# Patient Record
Sex: MALE | Race: WHITE | NOT HISPANIC OR LATINO | URBAN - METROPOLITAN AREA
[De-identification: names, ages, dates, MRNs, and addresses within clinical notes are randomized per-mention and may not be internally consistent; named-entity substitution may affect disease eponyms.]

---

## 2017-05-02 ENCOUNTER — OUTPATIENT (OUTPATIENT)
Dept: OUTPATIENT SERVICES | Facility: HOSPITAL | Age: 72
LOS: 1 days | Discharge: HOME | End: 2017-05-02

## 2017-06-28 DIAGNOSIS — I25.10 ATHEROSCLEROTIC HEART DISEASE OF NATIVE CORONARY ARTERY WITHOUT ANGINA PECTORIS: ICD-10-CM

## 2017-06-28 DIAGNOSIS — E78.00 PURE HYPERCHOLESTEROLEMIA, UNSPECIFIED: ICD-10-CM

## 2017-06-28 DIAGNOSIS — Z79.899 OTHER LONG TERM (CURRENT) DRUG THERAPY: ICD-10-CM

## 2017-08-01 ENCOUNTER — APPOINTMENT (OUTPATIENT)
Dept: SURGERY | Facility: CLINIC | Age: 72
End: 2017-08-01
Payer: MEDICARE

## 2017-08-01 VITALS
WEIGHT: 170 LBS | DIASTOLIC BLOOD PRESSURE: 85 MMHG | OXYGEN SATURATION: 96 % | BODY MASS INDEX: 25.76 KG/M2 | HEART RATE: 97 BPM | SYSTOLIC BLOOD PRESSURE: 130 MMHG | HEIGHT: 68 IN

## 2017-08-01 DIAGNOSIS — Z78.9 OTHER SPECIFIED HEALTH STATUS: ICD-10-CM

## 2017-08-01 DIAGNOSIS — Z87.898 PERSONAL HISTORY OF OTHER SPECIFIED CONDITIONS: ICD-10-CM

## 2017-08-01 DIAGNOSIS — Z98.890 OTHER SPECIFIED POSTPROCEDURAL STATES: ICD-10-CM

## 2017-08-01 DIAGNOSIS — Z87.19 PERSONAL HISTORY OF OTHER DISEASES OF THE DIGESTIVE SYSTEM: ICD-10-CM

## 2017-08-01 DIAGNOSIS — Z86.79 PERSONAL HISTORY OF OTHER DISEASES OF THE CIRCULATORY SYSTEM: ICD-10-CM

## 2017-08-01 DIAGNOSIS — Z82.49 FAMILY HISTORY OF ISCHEMIC HEART DISEASE AND OTHER DISEASES OF THE CIRCULATORY SYSTEM: ICD-10-CM

## 2017-08-01 PROBLEM — Z00.00 ENCOUNTER FOR PREVENTIVE HEALTH EXAMINATION: Status: ACTIVE | Noted: 2017-08-01

## 2017-08-01 PROCEDURE — 99203 OFFICE O/P NEW LOW 30 MIN: CPT

## 2017-08-09 ENCOUNTER — OUTPATIENT (OUTPATIENT)
Dept: OUTPATIENT SERVICES | Facility: HOSPITAL | Age: 72
LOS: 1 days | Discharge: HOME | End: 2017-08-09

## 2017-08-09 DIAGNOSIS — K82.9 DISEASE OF GALLBLADDER, UNSPECIFIED: ICD-10-CM

## 2017-08-09 DIAGNOSIS — Z95.5 PRESENCE OF CORONARY ANGIOPLASTY IMPLANT AND GRAFT: ICD-10-CM

## 2017-08-09 DIAGNOSIS — K80.20 CALCULUS OF GALLBLADDER WITHOUT CHOLECYSTITIS WITHOUT OBSTRUCTION: ICD-10-CM

## 2017-08-09 DIAGNOSIS — I25.10 ATHEROSCLEROTIC HEART DISEASE OF NATIVE CORONARY ARTERY WITHOUT ANGINA PECTORIS: ICD-10-CM

## 2017-08-09 DIAGNOSIS — R06.02 SHORTNESS OF BREATH: ICD-10-CM

## 2017-08-09 DIAGNOSIS — Z87.891 PERSONAL HISTORY OF NICOTINE DEPENDENCE: ICD-10-CM

## 2017-08-09 DIAGNOSIS — E78.00 PURE HYPERCHOLESTEROLEMIA, UNSPECIFIED: ICD-10-CM

## 2017-08-09 DIAGNOSIS — I73.9 PERIPHERAL VASCULAR DISEASE, UNSPECIFIED: ICD-10-CM

## 2017-08-09 DIAGNOSIS — I10 ESSENTIAL (PRIMARY) HYPERTENSION: ICD-10-CM

## 2017-08-09 DIAGNOSIS — R01.0 BENIGN AND INNOCENT CARDIAC MURMURS: ICD-10-CM

## 2017-08-09 DIAGNOSIS — Z01.818 ENCOUNTER FOR OTHER PREPROCEDURAL EXAMINATION: ICD-10-CM

## 2017-08-09 DIAGNOSIS — K21.9 GASTRO-ESOPHAGEAL REFLUX DISEASE WITHOUT ESOPHAGITIS: ICD-10-CM

## 2017-08-23 ENCOUNTER — OUTPATIENT (OUTPATIENT)
Dept: OUTPATIENT SERVICES | Facility: HOSPITAL | Age: 72
LOS: 1 days | Discharge: HOME | End: 2017-08-23

## 2017-08-25 DIAGNOSIS — K80.10 CALCULUS OF GALLBLADDER WITH CHRONIC CHOLECYSTITIS WITHOUT OBSTRUCTION: ICD-10-CM

## 2017-08-25 DIAGNOSIS — I10 ESSENTIAL (PRIMARY) HYPERTENSION: ICD-10-CM

## 2017-08-28 ENCOUNTER — APPOINTMENT (OUTPATIENT)
Dept: SURGERY | Facility: CLINIC | Age: 72
End: 2017-08-28
Payer: MEDICARE

## 2017-08-28 DIAGNOSIS — K80.20 CALCULUS OF GALLBLADDER W/OUT CHOLECYSTITIS W/OUT OBSTRUCTION: ICD-10-CM

## 2017-08-28 DIAGNOSIS — Z90.49 ACQUIRED ABSENCE OF OTHER SPECIFIED PARTS OF DIGESTIVE TRACT: ICD-10-CM

## 2017-08-28 PROCEDURE — 99024 POSTOP FOLLOW-UP VISIT: CPT

## 2017-09-05 ENCOUNTER — APPOINTMENT (OUTPATIENT)
Dept: SURGERY | Facility: CLINIC | Age: 72
End: 2017-09-05

## 2017-11-09 ENCOUNTER — OUTPATIENT (OUTPATIENT)
Dept: OUTPATIENT SERVICES | Facility: HOSPITAL | Age: 72
LOS: 1 days | Discharge: HOME | End: 2017-11-09

## 2017-11-09 DIAGNOSIS — E78.00 PURE HYPERCHOLESTEROLEMIA, UNSPECIFIED: ICD-10-CM

## 2017-11-09 DIAGNOSIS — Z79.899 OTHER LONG TERM (CURRENT) DRUG THERAPY: ICD-10-CM

## 2017-11-09 DIAGNOSIS — I25.10 ATHEROSCLEROTIC HEART DISEASE OF NATIVE CORONARY ARTERY WITHOUT ANGINA PECTORIS: ICD-10-CM

## 2017-12-11 ENCOUNTER — OUTPATIENT (OUTPATIENT)
Dept: OUTPATIENT SERVICES | Facility: HOSPITAL | Age: 72
LOS: 1 days | Discharge: HOME | End: 2017-12-11

## 2017-12-11 DIAGNOSIS — R06.02 SHORTNESS OF BREATH: ICD-10-CM

## 2018-05-26 ENCOUNTER — OUTPATIENT (OUTPATIENT)
Dept: OUTPATIENT SERVICES | Facility: HOSPITAL | Age: 73
LOS: 1 days | Discharge: HOME | End: 2018-05-26

## 2018-05-26 DIAGNOSIS — Z79.899 OTHER LONG TERM (CURRENT) DRUG THERAPY: ICD-10-CM

## 2018-05-26 DIAGNOSIS — E78.00 PURE HYPERCHOLESTEROLEMIA, UNSPECIFIED: ICD-10-CM

## 2018-05-26 DIAGNOSIS — I25.10 ATHEROSCLEROTIC HEART DISEASE OF NATIVE CORONARY ARTERY WITHOUT ANGINA PECTORIS: ICD-10-CM

## 2018-07-23 PROBLEM — Z78.9 ALCOHOL USE: Status: ACTIVE | Noted: 2017-08-01

## 2018-09-24 ENCOUNTER — OUTPATIENT (OUTPATIENT)
Dept: OUTPATIENT SERVICES | Facility: HOSPITAL | Age: 73
LOS: 1 days | Discharge: HOME | End: 2018-09-24

## 2018-09-24 DIAGNOSIS — E78.00 PURE HYPERCHOLESTEROLEMIA, UNSPECIFIED: ICD-10-CM

## 2018-09-24 DIAGNOSIS — Z79.899 OTHER LONG TERM (CURRENT) DRUG THERAPY: ICD-10-CM

## 2018-09-24 DIAGNOSIS — I25.10 ATHEROSCLEROTIC HEART DISEASE OF NATIVE CORONARY ARTERY WITHOUT ANGINA PECTORIS: ICD-10-CM

## 2018-12-28 ENCOUNTER — OUTPATIENT (OUTPATIENT)
Dept: OUTPATIENT SERVICES | Facility: HOSPITAL | Age: 73
LOS: 1 days | Discharge: HOME | End: 2018-12-28

## 2018-12-28 DIAGNOSIS — Z79.899 OTHER LONG TERM (CURRENT) DRUG THERAPY: ICD-10-CM

## 2018-12-28 DIAGNOSIS — E78.00 PURE HYPERCHOLESTEROLEMIA, UNSPECIFIED: ICD-10-CM

## 2018-12-28 DIAGNOSIS — I25.10 ATHEROSCLEROTIC HEART DISEASE OF NATIVE CORONARY ARTERY WITHOUT ANGINA PECTORIS: ICD-10-CM

## 2019-07-07 ENCOUNTER — EMERGENCY (EMERGENCY)
Facility: HOSPITAL | Age: 74
LOS: 0 days | Discharge: HOME | End: 2019-07-07
Attending: EMERGENCY MEDICINE | Admitting: EMERGENCY MEDICINE
Payer: MEDICARE

## 2019-07-07 VITALS
HEART RATE: 62 BPM | DIASTOLIC BLOOD PRESSURE: 69 MMHG | RESPIRATION RATE: 19 BRPM | SYSTOLIC BLOOD PRESSURE: 114 MMHG | OXYGEN SATURATION: 96 %

## 2019-07-07 VITALS
HEART RATE: 85 BPM | DIASTOLIC BLOOD PRESSURE: 73 MMHG | TEMPERATURE: 98 F | SYSTOLIC BLOOD PRESSURE: 143 MMHG | RESPIRATION RATE: 18 BRPM | OXYGEN SATURATION: 96 %

## 2019-07-07 DIAGNOSIS — S22.42XA MULTIPLE FRACTURES OF RIBS, LEFT SIDE, INITIAL ENCOUNTER FOR CLOSED FRACTURE: ICD-10-CM

## 2019-07-07 DIAGNOSIS — R06.02 SHORTNESS OF BREATH: ICD-10-CM
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
## 2019-07-07 DIAGNOSIS — Z79.82 LONG TERM (CURRENT) USE OF ASPIRIN: ICD-10-CM

## 2019-07-07 DIAGNOSIS — R05 COUGH: ICD-10-CM

## 2019-07-07 DIAGNOSIS — R10.12 LEFT UPPER QUADRANT PAIN: ICD-10-CM

## 2019-07-07 DIAGNOSIS — S32.009A UNSPECIFIED FRACTURE OF UNSPECIFIED LUMBAR VERTEBRA, INITIAL ENCOUNTER FOR CLOSED FRACTURE: ICD-10-CM
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
## 2019-07-07 DIAGNOSIS — R07.89 OTHER CHEST PAIN: ICD-10-CM

## 2019-07-07 DIAGNOSIS — I10 ESSENTIAL (PRIMARY) HYPERTENSION: ICD-10-CM

## 2019-07-07 DIAGNOSIS — Z90.49 ACQUIRED ABSENCE OF OTHER SPECIFIED PARTS OF DIGESTIVE TRACT: Chronic | ICD-10-CM

## 2019-07-07 LAB
ALBUMIN SERPL ELPH-MCNC: 4 G/DL — SIGNIFICANT CHANGE UP (ref 3.5–5.2)
ALP SERPL-CCNC: 71 U/L — SIGNIFICANT CHANGE UP (ref 30–115)
ALT FLD-CCNC: 23 U/L — SIGNIFICANT CHANGE UP (ref 0–41)
ANION GAP SERPL CALC-SCNC: 12 MMOL/L — SIGNIFICANT CHANGE UP (ref 7–14)
APPEARANCE UR: CLEAR — SIGNIFICANT CHANGE UP
APTT BLD: 27 SEC — SIGNIFICANT CHANGE UP (ref 27–39.2)
AST SERPL-CCNC: 32 U/L — SIGNIFICANT CHANGE UP (ref 0–41)
BASE EXCESS BLDV CALC-SCNC: 1.2 MMOL/L — SIGNIFICANT CHANGE UP (ref -2–2)
BASOPHILS # BLD AUTO: 0.02 K/UL — SIGNIFICANT CHANGE UP (ref 0–0.2)
BASOPHILS NFR BLD AUTO: 0.2 % — SIGNIFICANT CHANGE UP (ref 0–1)
BILIRUB SERPL-MCNC: 1.7 MG/DL — HIGH (ref 0.2–1.2)
BILIRUB UR-MCNC: NEGATIVE — SIGNIFICANT CHANGE UP
BLD GP AB SCN SERPL QL: SIGNIFICANT CHANGE UP
BUN SERPL-MCNC: 21 MG/DL — HIGH (ref 10–20)
CA-I SERPL-SCNC: 1.32 MMOL/L — HIGH (ref 1.12–1.3)
CALCIUM SERPL-MCNC: 9.9 MG/DL — SIGNIFICANT CHANGE UP (ref 8.5–10.1)
CHLORIDE SERPL-SCNC: 102 MMOL/L — SIGNIFICANT CHANGE UP (ref 98–110)
CO2 SERPL-SCNC: 24 MMOL/L — SIGNIFICANT CHANGE UP (ref 17–32)
COLOR SPEC: YELLOW — SIGNIFICANT CHANGE UP
CREAT SERPL-MCNC: 1 MG/DL — SIGNIFICANT CHANGE UP (ref 0.7–1.5)
DIFF PNL FLD: NEGATIVE — SIGNIFICANT CHANGE UP
EOSINOPHIL # BLD AUTO: 0.03 K/UL — SIGNIFICANT CHANGE UP (ref 0–0.7)
EOSINOPHIL NFR BLD AUTO: 0.2 % — SIGNIFICANT CHANGE UP (ref 0–8)
GAS PNL BLDV: 141 MMOL/L — SIGNIFICANT CHANGE UP (ref 136–145)
GAS PNL BLDV: SIGNIFICANT CHANGE UP
GLUCOSE SERPL-MCNC: 129 MG/DL — HIGH (ref 70–99)
GLUCOSE UR QL: NEGATIVE — SIGNIFICANT CHANGE UP
HCO3 BLDV-SCNC: 29 MMOL/L — SIGNIFICANT CHANGE UP (ref 22–29)
HCT VFR BLD CALC: 42.1 % — SIGNIFICANT CHANGE UP (ref 42–52)
HCT VFR BLDA CALC: 44.8 % — HIGH (ref 34–44)
HGB BLD CALC-MCNC: 14.6 G/DL — SIGNIFICANT CHANGE UP (ref 14–18)
HGB BLD-MCNC: 14 G/DL — SIGNIFICANT CHANGE UP (ref 14–18)
IMM GRANULOCYTES NFR BLD AUTO: 0.4 % — HIGH (ref 0.1–0.3)
INR BLD: 1.11 RATIO — SIGNIFICANT CHANGE UP (ref 0.65–1.3)
KETONES UR-MCNC: NEGATIVE — SIGNIFICANT CHANGE UP
LACTATE BLDV-MCNC: 2.6 MMOL/L — HIGH (ref 0.5–1.6)
LEUKOCYTE ESTERASE UR-ACNC: NEGATIVE — SIGNIFICANT CHANGE UP
LYMPHOCYTES # BLD AUTO: 2.87 K/UL — SIGNIFICANT CHANGE UP (ref 1.2–3.4)
LYMPHOCYTES # BLD AUTO: 22.1 % — SIGNIFICANT CHANGE UP (ref 20.5–51.1)
MCHC RBC-ENTMCNC: 30 PG — SIGNIFICANT CHANGE UP (ref 27–31)
MCHC RBC-ENTMCNC: 33.3 G/DL — SIGNIFICANT CHANGE UP (ref 32–37)
MCV RBC AUTO: 90.3 FL — SIGNIFICANT CHANGE UP (ref 80–94)
MONOCYTES # BLD AUTO: 1.56 K/UL — HIGH (ref 0.1–0.6)
MONOCYTES NFR BLD AUTO: 12 % — HIGH (ref 1.7–9.3)
NEUTROPHILS # BLD AUTO: 8.48 K/UL — HIGH (ref 1.4–6.5)
NEUTROPHILS NFR BLD AUTO: 65.1 % — SIGNIFICANT CHANGE UP (ref 42.2–75.2)
NITRITE UR-MCNC: NEGATIVE — SIGNIFICANT CHANGE UP
NRBC # BLD: 0 /100 WBCS — SIGNIFICANT CHANGE UP (ref 0–0)
PCO2 BLDV: 56 MMHG — HIGH (ref 41–51)
PH BLDV: 7.32 — SIGNIFICANT CHANGE UP (ref 7.26–7.43)
PH UR: 6 — SIGNIFICANT CHANGE UP (ref 5–8)
PLATELET # BLD AUTO: 153 K/UL — SIGNIFICANT CHANGE UP (ref 130–400)
PO2 BLDV: 28 MMHG — SIGNIFICANT CHANGE UP (ref 20–40)
POTASSIUM BLDV-SCNC: 4.5 MMOL/L — SIGNIFICANT CHANGE UP (ref 3.3–5.6)
POTASSIUM SERPL-MCNC: 5.2 MMOL/L — HIGH (ref 3.5–5)
POTASSIUM SERPL-SCNC: 5.2 MMOL/L — HIGH (ref 3.5–5)
PROT SERPL-MCNC: 7.4 G/DL — SIGNIFICANT CHANGE UP (ref 6–8)
PROT UR-MCNC: NEGATIVE — SIGNIFICANT CHANGE UP
PROTHROM AB SERPL-ACNC: 12.7 SEC — SIGNIFICANT CHANGE UP (ref 9.95–12.87)
RBC # BLD: 4.66 M/UL — LOW (ref 4.7–6.1)
RBC # FLD: 13.1 % — SIGNIFICANT CHANGE UP (ref 11.5–14.5)
SAO2 % BLDV: 45 % — SIGNIFICANT CHANGE UP
SODIUM SERPL-SCNC: 138 MMOL/L — SIGNIFICANT CHANGE UP (ref 135–146)
SP GR SPEC: >=1.03 — SIGNIFICANT CHANGE UP (ref 1.01–1.03)
UROBILINOGEN FLD QL: 0.2 — SIGNIFICANT CHANGE UP (ref 0.2–0.2)
WBC # BLD: 13.01 K/UL — HIGH (ref 4.8–10.8)
WBC # FLD AUTO: 13.01 K/UL — HIGH (ref 4.8–10.8)

## 2019-07-07 PROCEDURE — 74177 CT ABD & PELVIS W/CONTRAST: CPT | Mod: 26

## 2019-07-07 PROCEDURE — 71260 CT THORAX DX C+: CPT | Mod: 26

## 2019-07-07 PROCEDURE — 72125 CT NECK SPINE W/O DYE: CPT | Mod: 26

## 2019-07-07 PROCEDURE — 99291 CRITICAL CARE FIRST HOUR: CPT

## 2019-07-07 PROCEDURE — 71045 X-RAY EXAM CHEST 1 VIEW: CPT | Mod: 26

## 2019-07-07 PROCEDURE — 99284 EMERGENCY DEPT VISIT MOD MDM: CPT

## 2019-07-07 PROCEDURE — 70450 CT HEAD/BRAIN W/O DYE: CPT | Mod: 26

## 2019-07-07 RX ORDER — MORPHINE SULFATE 50 MG/1
8 CAPSULE, EXTENDED RELEASE ORAL ONCE
Refills: 0 | Status: DISCONTINUED | OUTPATIENT
Start: 2019-07-07 | End: 2019-07-07

## 2019-07-07 RX ORDER — MORPHINE SULFATE 50 MG/1
4 CAPSULE, EXTENDED RELEASE ORAL ONCE
Refills: 0 | Status: DISCONTINUED | OUTPATIENT
Start: 2019-07-07 | End: 2019-07-07

## 2019-07-07 RX ADMIN — MORPHINE SULFATE 8 MILLIGRAM(S): 50 CAPSULE, EXTENDED RELEASE ORAL at 15:29

## 2019-07-07 RX ADMIN — MORPHINE SULFATE 4 MILLIGRAM(S): 50 CAPSULE, EXTENDED RELEASE ORAL at 14:36

## 2019-07-07 RX ADMIN — MORPHINE SULFATE 4 MILLIGRAM(S): 50 CAPSULE, EXTENDED RELEASE ORAL at 14:45

## 2019-07-07 NOTE — ED PROVIDER NOTE - NS ED ROS FT
Constitutional: no fevers/chills, no sick contacts  Eyes: No visual changes, eye pain or discharge. No photophobia  ENMT: No hearing changes, pain, discharge or infections. No sore throat or drooling.  Neck:  No neck pain or stiffness. No limited ROM  Cardiac: + SOB, no edema. No chest pain with exertion.  Respiratory: +cough, no respiratory distress. No hemoptysis. No history of asthma or RAD  GI: No nausea, vomiting, diarrhea or abdominal pain, +flank pain  : No dysuria, frequency or burning. No discharge, no hematuria  MS: No myalgia, muscle weakness, joint pain or back pain  Neuro: No headache or weakness. No LOC  Skin: No skin rash  Endo: no diabetes or thyroid dysfunction  Heme: no abnormal bleeding or clotting  Except as documented in the HPI, all other systems are negative

## 2019-07-07 NOTE — ED PROVIDER NOTE - SECONDARY DIAGNOSIS.
Closed fracture of multiple ribs of left side, initial encounter Closed fracture of transverse process of lumbar vertebra, initial encounter

## 2019-07-07 NOTE — ED PROVIDER NOTE - CARE PROVIDERS DIRECT ADDRESSES
[email or web address]

## 2019-07-07 NOTE — CONSULT NOTE ADULT - ATTENDING COMMENTS
multiple rib fx >24 hrs ago. patient's pain is controlled with po motrin/tylenol/oxycodone. IS 2000  Assessment and plan above were modified and discussed with residents, physician assistants, and nurses.

## 2019-07-07 NOTE — ED PROVIDER NOTE - PHYSICAL EXAMINATION
CONSTITUTIONAL: well developed; well nourished; well appearing in no acute distress  HEAD: normocephalic; atraumatic  EYES: no conjunctival injection, no scleral icterus  ENT: no nasal discharge; airway clear.  NECK: supple; non tender. + full passive ROM in all directions  CARD: S1, S2 normal; no murmurs, gallops, or rubs. Regular rate and rhythm  RESP: no wheezes, rales or rhonchi. Good air movement bilaterally without significant accessory muscle use  ABD: soft; non-distended; non-tender. No rebound, no guarding, no pulsatile abdominal mass  EXT: moving all extremities spontaneously, normal ROM. No clubbing, cyanosis or edema  SKIN: warm and dry, +erythema to L flank w/ overlying tenderness  NEURO: alert, oriented, CN II-XII grossly intact, motor and sensory grossly intact, speech nonslurred, no focal deficits. GCS 15  PSYCH: calm, cooperative, appropriate, good eye contact, logical thought process, no apparent danger to self or others

## 2019-07-07 NOTE — CONSULT NOTE ADULT - SUBJECTIVE AND OBJECTIVE BOX
TRAUMA ACTIVATION LEVEL:  TRAUMA CONSULT    MECHANISM OF INJURY:      [] Blunt  	[] MVC	[X] Fall	[] Pedestrian Struck	[] Motorcycle   [] Assault   [] Bicycle collision  [] Sports injury     [] Penetrating  	[] Gun Shot Wound 		[] Stab Wound    GCS: 15 	E: 4	V: 5	M: 6    HPI:  74M, PMHx CAD, HTN, MI, presents to the ED s/p fall 1 day ago. Pt states he was working outside yesterday, on a ladder, when he misstepped and fell off 6 feet and landed onto his back. -HT, -LOC, on ASA. Pt laid on the floor for a few minutes after the fall but was able to ambulate. Pt presents to the ED today for pain to his L upper back with radiation to L chest wall. Otherwise no HA, no neck pain, no CP, no SOB, no abdominal pain , no musculoskeletal pain.     PAST MEDICAL & SURGICAL HISTORY:  Myocardial infarction  Hypertension  CAD (coronary artery disease)  History of cholecystectomy      Allergies    No Known Allergies    Intolerances        Home Medications:  aspirin 81 mg oral tablet: 1 tab(s) orally once a day (07 Jul 2019 16:17)  Brilinta (ticagrelor) 90 mg oral tablet: 1 tab(s) orally 2 times a day (07 Jul 2019 16:17)  clopidogrel 75 mg oral tablet: 1 tab(s) orally once a day (07 Jul 2019 16:17)  Crestor 40 mg oral tablet: 1 tab(s) orally once a day (07 Jul 2019 16:17)  eplerenone 25 mg oral tablet: 1 tab(s) orally once a day (07 Jul 2019 16:17)  metoprolol succinate 25 mg oral tablet, extended release: 1 tab(s) orally once a day (07 Jul 2019 16:17)  ramipril 10 mg oral capsule: 1 cap(s) orally once a day (07 Jul 2019 16:17)      ROS: 10-system review is otherwise negative except HPI above.      Primary Survey:    A - airway intact  B - bilateral breath sounds and good chest rise  C - palpable pulses in all extremities  D - GCS 15 on arrival, MCDOWELL  Exposure obtained    Vital Signs Last 24 Hrs  T(C): 36.8 (07 Jul 2019 13:22), Max: 36.8 (07 Jul 2019 13:22)  T(F): 98.3 (07 Jul 2019 13:22), Max: 98.3 (07 Jul 2019 13:22)  HR: 85 (07 Jul 2019 13:22) (85 - 85)  BP: 143/73 (07 Jul 2019 13:22) (143/73 - 143/73)  BP(mean): --  RR: 18 (07 Jul 2019 13:22) (18 - 18)  SpO2: 96% (07 Jul 2019 13:22) (96% - 96%)    Secondary Survey:   General: NAD  HEENT: Normocephalic, atraumatic, EOMI, PEERLA. no scalp lacerations   Neck: Soft, midline trachea. no cspine tenderness  Chest: mild L sided chest wall tenderness. or subq  emphysema   Cardiac: S1, S2, RRR  Respiratory: Bilateral breath sounds, clear and equal bilaterally  Abdomen: Soft, non-distended, non-tender, no rebound,   Groin: Normal appearing, pelvis stable   Ext: palp radial b/l UE, b/l DP palp in Lower Extrem.   Back: mild TTP over L upper back, no palpable runoff/stepoff/deformity    FAST    Procedures:    LABS:  Labs:  CAPILLARY BLOOD GLUCOSE                              14.0   13.01 )-----------( 153      ( 07 Jul 2019 14:47 )             42.1       Auto Neutrophil %: 65.1 % (07-07-19 @ 14:47)  Auto Immature Granulocyte %: 0.4 % (07-07-19 @ 14:47)    07-07    138  |  102  |  21<H>  ----------------------------<  129<H>  5.2<H>   |  24  |  1.0      Calcium, Total Serum: 9.9 mg/dL (07-07-19 @ 14:47)      LFTs:             7.4  | 1.7  | 32       ------------------[71      ( 07 Jul 2019 14:47 )  4.0  | x    | 23          Lipase:x      Amylase:x         Blood Gas Venous - Lactate: 2.6 mmoL/L (07-07-19 @ 15:05)      Coags:     12.70  ----< 1.11    ( 07 Jul 2019 14:47 )     27.0            RADIOLOGY & ADDITIONAL STUDIES:  < from: CT Head No Cont (07.07.19 @ 16:03) >  Impression  Normal exam  < end of copied text >      < from: CT Cervical Spine No Cont (07.07.19 @ 16:08) >  Impression:  No CT evidence for acute cervical injury.  Degenerative changes as above.  < end of copied text >      < from: CT Chest w/ IV Cont (07.07.19 @ 16:16) >  IMPRESSION:   Fractures of the left anterolateral sixth, seventh and eighth ribs and   posterior left ninth, 10th and 11th ribs.Fracture left transverse process   of L1 and right transverse process of L4  < end of copied text >      < from: CT Abdomen and Pelvis w/ IV Cont (07.07.19 @ 16:17) >  IMPRESSION:   Fractures of the left anterolateral sixth, seventh and eighth ribs and   posterior left ninth, 10th and 11th ribs.Fracture left transverse process   of L1 and right transverse process of L4  < end of copied text >      < from: Xray Chest 1 View- PORTABLE-Urgent (07.07.19 @ 14:48) >  Impression:    No radiographic evidence of acute pulmonary disease.  < end of copied text >

## 2019-07-07 NOTE — ED PROVIDER NOTE - CONSTITUTIONAL, MLM
Dressing: bandage Detail Level: Detailed Epidermal Sutures: 4-0 Ethilon Wound Care: Petrolatum Patient Will Remove Sutures At Home?: No Additional Anesthesia Volume In Cc (Will Not Render If 0): 0 Billing Type: Third-Party Bill Anesthesia Volume In Cc (Will Not Render If 0): 0.5 Biopsy Type: H and E Suture Removal: 14 days Punch Size In Mm: 8 Was A Bandage Applied: Yes Hemostasis: None Anesthesia Type: 1% lidocaine with epinephrine normal... Well appearing, well nourished, awake, alert, oriented to person, place, time/situation and in no apparent distress.

## 2019-07-07 NOTE — ED PROVIDER NOTE - OBJECTIVE STATEMENT
73 y/o male with a PMHx of CAD and HTN presents to the ED with complaints of left sided back pain and chest pain after falling of a ladder yesterday. Pt states that he was on the garage roof and fell of a 6ft ladder onto a wooden plank in his backyard. Pt remained still for a few minutes and was able to ambulate normally afterwards. Pt waited for the pain to subside and came in the ED today because it did not. Pt complains of erythema and pain around the left flank which radiates to the LUQ. Pt also complains of left-sided chest pain at rib 6 that does not radiate. Pt describes his pain as sharp, constant, and worse with coughing. Pt reports that the pain is not worse with inspiration. Pt denies any head trauma, LOC, memory loss, numbness, tingling, saddle anesthesia, or urinary/bowel incontinence. Pt has been voiding normally since yesterday. Pt has SOB and a chronic cough which have been extensively investigated by his cardiologist and have been deemed as non-cardiac in nature. Pt previously had a double bypass and stents placed after MI.

## 2019-07-07 NOTE — CONSULT NOTE ADULT - ASSESSMENT
ASSESSMENT/PLAN:   74M, s/p fall off ladder 1 day ago, -HT, -LOC, on ASA. CT findings of L anterolateral 6,7,8; L posterior 9,10,11 rib fx, and L transverse process L1 fx, R transverse L4 process fx. Pt is pulling 2000 on IS.   - Spoke to patient, recommended admission for pain control and observation, however pt requested to be discharged.   - Discussed with patient to continue using IS, and continue using pain control.

## 2019-07-07 NOTE — ED PROVIDER NOTE - MDM PATIENT STATEMENT FOR ADDL TREATMENT
Aspiration pneumonia, unspecified aspiration pneumonia type, unspecified laterality, unspecified part of lung Patient with one or more new problems requiring additional work-up/treatment.

## 2019-07-07 NOTE — ED ADULT NURSE NOTE - NSIMPLEMENTINTERV_GEN_ALL_ED
Implemented All Fall Risk Interventions:  [place] to call system. Call bell, personal items and telephone within reach. Instruct patient to call for assistance. Room bathroom lighting operational. Non-slip footwear when patient is off stretcher. Physically safe environment: no spills, clutter or unnecessary equipment. Stretcher in lowest position, wheels locked, appropriate side rails in place. Provide visual cue, wrist band, yellow gown, etc. Monitor gait and stability. Monitor for mental status changes and reorient to person, place, and time. Review medications for side effects contributing to fall risk. Reinforce activity limits and safety measures with patient and family.

## 2019-07-07 NOTE — ED ADULT NURSE NOTE - OBJECTIVE STATEMENT
Pt fell backward from 6ft ladder yesterday landing on a wooden partition. Pt denies head injury, LOC. Pt waited to seek medical attention stating "I was waiting to see if it would get better"

## 2019-07-07 NOTE — ED PROVIDER NOTE - CARE PLAN
Principal Discharge DX:	Fall, initial encounter  Secondary Diagnosis:	Closed fracture of multiple ribs of left side, initial encounter  Secondary Diagnosis:	Closed fracture of transverse process of lumbar vertebra, initial encounter

## 2019-07-07 NOTE — ED ADULT TRIAGE NOTE - CHIEF COMPLAINT QUOTE
Pt sent in from urgent care s/p fall off ladder yesterday outside onto back, denies loc or head injury  Pt on plavix, complains of left sided back pain

## 2019-07-07 NOTE — ED PROVIDER NOTE - CLINICAL SUMMARY MEDICAL DECISION MAKING FREE TEXT BOX
73yo M presenting with L rib/flank pain sp mechanical fal, no anticoagulation- labs, CTs done- CT showing multiple rib fractures, 2 transverse process fractures, trauma consulted evaluated, pt would not like to stay, would like to be discharged and have close outpatient follow-up. Pt pulling adequate on incentive spirometer, Patient and family aware of all results, given a copy of all results, comfortable with discharge and follow-up outpatient, strict return precautions given. Patient endorses understanding of all of this and aware that they can return at any time for new or concerning symptoms. No further questions or concerns at this time

## 2019-07-07 NOTE — ED PROVIDER NOTE - NS_EDPROVIDERDISPOUSERTYPE_ED_A_ED
Medical Students Attestation (For Medical Student USE Only)... Attending Attestation (For Attendings USE Only).../Medical Students Attestation (For Medical Student USE Only)...

## 2019-07-07 NOTE — ED PROVIDER NOTE - PROGRESS NOTE DETAILS
E. Klerman: Attending attestation - 74yM p/w L flank pain after falling off ladder ~6ft onto a wooden plank on the ground.  Fall sustained yesterday afternoon and pt presented this afternoon when pain was unchanged ~24hr after injury. Pt on aspirin/plavix but no further anticoagulation.  Denies head injury, LOC, head/neck pain.  Is tolerating PO and urinating appropriately.  VS notable for mildly elevated /73.  Exam w/ erythema overlying L flank w/o other tenderness.  No abd pain.  No bony tenderness, including no c-spine tenderness.  Plan for labs, FAST, CT evaluation and reassess.  Pt treated with IV morphine for pain. trauma consult called trauma consult called. Pt updated, awaiting recs. dw trauma, as this is 24hrs out and pt would like to go home, can discharge with close outpatient follow-up. Patient and family aware of all results, given a copy of all results, comfortable with discharge and follow-up outpatient, strict return precautions given. Patient endorses understanding of all of this and aware that they can return at any time for new or concerning symptoms. No further questions or concerns at this time

## 2019-07-07 NOTE — ED PROVIDER NOTE - CARE PROVIDER_API CALL
Suhas Boothe)  Surgical Physicians  [address]rd [place] [address]  Phone: [phone]  Fax: [phone]  Follow Up Time: 1-3 Days    Sherman Mahoney)  Neurological Surgery  [address] [place] [address], [address]  [place] [address]  Phone: [phone]  Fax: [phone]  Follow Up Time:

## 2019-07-08 PROBLEM — I25.10 ATHEROSCLEROTIC HEART DISEASE OF NATIVE CORONARY ARTERY WITHOUT ANGINA PECTORIS: Chronic | Status: ACTIVE | Noted: 2019-07-07

## 2019-07-08 PROBLEM — I10 ESSENTIAL (PRIMARY) HYPERTENSION: Chronic | Status: ACTIVE | Noted: 2019-07-07

## 2019-07-08 PROBLEM — I21.9 ACUTE MYOCARDIAL INFARCTION, UNSPECIFIED: Chronic | Status: ACTIVE | Noted: 2019-07-07

## 2019-07-15 ENCOUNTER — APPOINTMENT (OUTPATIENT)
Dept: SURGERY | Facility: CLINIC | Age: 74
End: 2019-07-15
Payer: MEDICARE

## 2019-07-15 VITALS
HEIGHT: 68 IN | SYSTOLIC BLOOD PRESSURE: 130 MMHG | WEIGHT: 172 LBS | DIASTOLIC BLOOD PRESSURE: 70 MMHG | BODY MASS INDEX: 26.07 KG/M2

## 2019-07-15 PROCEDURE — 99214 OFFICE O/P EST MOD 30 MIN: CPT

## 2019-07-15 NOTE — ASSESSMENT
[FreeTextEntry1] : 75 yo male s/p fall with multiple rib and spine fractures. still in pain. reccomended around the clock motrin and tylenol. patient does not want opioids. otherwise not in distress able to ambulate and go up and down stairs. \par will re-eval in a week for adequate pain control. \par

## 2019-07-16 ENCOUNTER — APPOINTMENT (OUTPATIENT)
Dept: NEUROSURGERY | Facility: CLINIC | Age: 74
End: 2019-07-16
Payer: MEDICARE

## 2019-07-16 VITALS — WEIGHT: 172 LBS | HEIGHT: 68 IN | BODY MASS INDEX: 26.07 KG/M2

## 2019-07-16 DIAGNOSIS — F17.200 NICOTINE DEPENDENCE, UNSPECIFIED, UNCOMPLICATED: ICD-10-CM

## 2019-07-16 DIAGNOSIS — S22.49XA MULTIPLE FRACTURES OF RIBS, UNSPECIFIED SIDE, INITIAL ENCOUNTER FOR CLOSED FRACTURE: ICD-10-CM

## 2019-07-16 DIAGNOSIS — S32.009A UNSPECIFIED FRACTURE OF UNSPECIFIED LUMBAR VERTEBRA, INITIAL ENCOUNTER FOR CLOSED FRACTURE: ICD-10-CM

## 2019-07-16 PROCEDURE — 99204 OFFICE O/P NEW MOD 45 MIN: CPT

## 2019-07-16 NOTE — PHYSICAL EXAM
[General Appearance - Alert] : alert [General Appearance - In No Acute Distress] : in no acute distress [General Appearance - Well Nourished] : well nourished [General Appearance - Well-Appearing] : healthy appearing [Oriented To Time, Place, And Person] : oriented to person, place, and time [Cranial Nerves Optic (II)] : visual acuity intact bilaterally,  pupils equal round and reactive to light [Cranial Nerves Oculomotor (III)] : extraocular motion intact [Cranial Nerves Trigeminal (V)] : facial sensation intact symmetrically [Cranial Nerves Facial (VII)] : face symmetrical [Cranial Nerves Vestibulocochlear (VIII)] : hearing was intact bilaterally [Cranial Nerves Glossopharyngeal (IX)] : tongue and palate midline [Cranial Nerves Accessory (XI - Cranial And Spinal)] : head turning and shoulder shrug symmetric [Cranial Nerves Hypoglossal (XII)] : there was no tongue deviation with protrusion [Motor Tone] : muscle tone was normal in all four extremities [Motor Strength] : muscle strength was normal in all four extremities [Full ROM] : full ROM [Normal] : normal [Intact] : all reflexes within normal limits bilaterally [___] : absent on the right [___] : absent on the left [Patel] : Patel's sign was not demonstrated [FreeTextEntry1] : He has tenderness along the left posterior ribs cage.

## 2019-07-16 NOTE — HISTORY OF PRESENT ILLNESS
[de-identified] : Mr. Coulter sustained a fall on 7/6/19 off a 6 foot ladder. He presented to the ER after the fall and was found to have multiple rib fractures on the left side along with a left L1 and right L4 transverse process fractures. He continues to have left sided posterior rib pain. He denies neck, midthoracic, or lower lumbar pain. He denies numbness or paraesthesias. He is taking OTC NSAIDS as needed for pain control.

## 2019-07-16 NOTE — ASSESSMENT
[FreeTextEntry1] : We have had a thorough discussion regarding his findings. The transverse process fractures are stable and no neurosurgical intervention is indicated. He will give himself time to heal. We have discussed activity restrictions today. i will see him back in the office as needed.

## 2019-07-22 ENCOUNTER — APPOINTMENT (OUTPATIENT)
Dept: SURGERY | Facility: CLINIC | Age: 74
End: 2019-07-22

## 2019-08-06 ENCOUNTER — LABORATORY RESULT (OUTPATIENT)
Age: 74
End: 2019-08-06

## 2019-08-06 ENCOUNTER — OUTPATIENT (OUTPATIENT)
Dept: OUTPATIENT SERVICES | Facility: HOSPITAL | Age: 74
LOS: 1 days | Discharge: HOME | End: 2019-08-06

## 2019-08-06 DIAGNOSIS — E78.00 PURE HYPERCHOLESTEROLEMIA, UNSPECIFIED: ICD-10-CM

## 2019-08-06 DIAGNOSIS — I25.10 ATHEROSCLEROTIC HEART DISEASE OF NATIVE CORONARY ARTERY WITHOUT ANGINA PECTORIS: ICD-10-CM

## 2019-08-06 DIAGNOSIS — Z79.899 OTHER LONG TERM (CURRENT) DRUG THERAPY: ICD-10-CM

## 2019-08-06 DIAGNOSIS — Z90.49 ACQUIRED ABSENCE OF OTHER SPECIFIED PARTS OF DIGESTIVE TRACT: Chronic | ICD-10-CM

## 2019-08-20 ENCOUNTER — APPOINTMENT (OUTPATIENT)
Dept: CARDIOLOGY | Facility: CLINIC | Age: 74
End: 2019-08-20

## 2019-08-20 ENCOUNTER — APPOINTMENT (OUTPATIENT)
Dept: CARDIOLOGY | Facility: CLINIC | Age: 74
End: 2019-08-20
Payer: MEDICARE

## 2019-08-20 PROCEDURE — 99214 OFFICE O/P EST MOD 30 MIN: CPT

## 2019-08-20 PROCEDURE — 93000 ELECTROCARDIOGRAM COMPLETE: CPT

## 2019-09-27 ENCOUNTER — OUTPATIENT (OUTPATIENT)
Dept: OUTPATIENT SERVICES | Facility: HOSPITAL | Age: 74
LOS: 1 days | Discharge: HOME | End: 2019-09-27
Payer: MEDICARE

## 2019-09-27 DIAGNOSIS — R07.9 CHEST PAIN, UNSPECIFIED: ICD-10-CM

## 2019-09-27 DIAGNOSIS — Z90.49 ACQUIRED ABSENCE OF OTHER SPECIFIED PARTS OF DIGESTIVE TRACT: Chronic | ICD-10-CM

## 2019-09-27 PROCEDURE — 78452 HT MUSCLE IMAGE SPECT MULT: CPT | Mod: 26

## 2019-12-13 ENCOUNTER — OUTPATIENT (OUTPATIENT)
Dept: OUTPATIENT SERVICES | Facility: HOSPITAL | Age: 74
LOS: 1 days | Discharge: HOME | End: 2019-12-13

## 2019-12-13 DIAGNOSIS — Z90.49 ACQUIRED ABSENCE OF OTHER SPECIFIED PARTS OF DIGESTIVE TRACT: Chronic | ICD-10-CM

## 2019-12-13 DIAGNOSIS — Z00.00 ENCOUNTER FOR GENERAL ADULT MEDICAL EXAMINATION WITHOUT ABNORMAL FINDINGS: ICD-10-CM

## 2019-12-27 ENCOUNTER — OUTPATIENT (OUTPATIENT)
Dept: OUTPATIENT SERVICES | Facility: HOSPITAL | Age: 74
LOS: 1 days | Discharge: HOME | End: 2019-12-27

## 2019-12-27 DIAGNOSIS — Z00.00 ENCOUNTER FOR GENERAL ADULT MEDICAL EXAMINATION WITHOUT ABNORMAL FINDINGS: ICD-10-CM

## 2019-12-27 DIAGNOSIS — E78.00 PURE HYPERCHOLESTEROLEMIA, UNSPECIFIED: ICD-10-CM

## 2019-12-27 DIAGNOSIS — I10 ESSENTIAL (PRIMARY) HYPERTENSION: ICD-10-CM

## 2019-12-27 DIAGNOSIS — Z90.49 ACQUIRED ABSENCE OF OTHER SPECIFIED PARTS OF DIGESTIVE TRACT: Chronic | ICD-10-CM

## 2020-01-27 ENCOUNTER — LABORATORY RESULT (OUTPATIENT)
Age: 75
End: 2020-01-27

## 2020-02-18 ENCOUNTER — APPOINTMENT (OUTPATIENT)
Dept: CARDIOLOGY | Facility: CLINIC | Age: 75
End: 2020-02-18
Payer: MEDICARE

## 2020-02-18 PROCEDURE — 93306 TTE W/DOPPLER COMPLETE: CPT

## 2020-02-25 ENCOUNTER — APPOINTMENT (OUTPATIENT)
Dept: CARDIOLOGY | Facility: CLINIC | Age: 75
End: 2020-02-25
Payer: MEDICARE

## 2020-02-25 PROCEDURE — 93000 ELECTROCARDIOGRAM COMPLETE: CPT

## 2020-02-25 PROCEDURE — 99214 OFFICE O/P EST MOD 30 MIN: CPT

## 2020-08-03 ENCOUNTER — OUTPATIENT (OUTPATIENT)
Dept: OUTPATIENT SERVICES | Facility: HOSPITAL | Age: 75
LOS: 1 days | Discharge: HOME | End: 2020-08-03
Payer: MEDICARE

## 2020-08-03 ENCOUNTER — RECORD ABSTRACTING (OUTPATIENT)
Age: 75
End: 2020-08-03

## 2020-08-03 DIAGNOSIS — Z86.39 PERSONAL HISTORY OF OTHER ENDOCRINE, NUTRITIONAL AND METABOLIC DISEASE: ICD-10-CM

## 2020-08-03 DIAGNOSIS — Z90.49 ACQUIRED ABSENCE OF OTHER SPECIFIED PARTS OF DIGESTIVE TRACT: Chronic | ICD-10-CM

## 2020-08-03 DIAGNOSIS — R42 DIZZINESS AND GIDDINESS: ICD-10-CM

## 2020-08-03 DIAGNOSIS — R51 HEADACHE: ICD-10-CM

## 2020-08-03 DIAGNOSIS — I21.11 ST ELEVATION (STEMI) MYOCARDIAL INFARCTION INVOLVING RIGHT CORONARY ARTERY: ICD-10-CM

## 2020-08-03 DIAGNOSIS — I34.0 NONRHEUMATIC MITRAL (VALVE) INSUFFICIENCY: ICD-10-CM

## 2020-08-03 PROCEDURE — 70553 MRI BRAIN STEM W/O & W/DYE: CPT | Mod: 26

## 2020-08-05 ENCOUNTER — LABORATORY RESULT (OUTPATIENT)
Age: 75
End: 2020-08-05

## 2020-09-24 ENCOUNTER — OUTPATIENT (OUTPATIENT)
Dept: OUTPATIENT SERVICES | Facility: HOSPITAL | Age: 75
LOS: 1 days | Discharge: HOME | End: 2020-09-24
Payer: COMMERCIAL

## 2020-09-24 DIAGNOSIS — I63.81 OTHER CEREBRAL INFARCTION DUE TO OCCLUSION OR STENOSIS OF SMALL ARTERY: ICD-10-CM

## 2020-09-24 DIAGNOSIS — Z90.49 ACQUIRED ABSENCE OF OTHER SPECIFIED PARTS OF DIGESTIVE TRACT: Chronic | ICD-10-CM

## 2020-09-24 PROCEDURE — 70546 MR ANGIOGRAPH HEAD W/O&W/DYE: CPT | Mod: 26

## 2020-10-21 ENCOUNTER — APPOINTMENT (OUTPATIENT)
Dept: CARDIOLOGY | Facility: CLINIC | Age: 75
End: 2020-10-21
Payer: MEDICARE

## 2020-10-21 VITALS
BODY MASS INDEX: 47.74 KG/M2 | HEART RATE: 95 BPM | WEIGHT: 315 LBS | HEIGHT: 68 IN | DIASTOLIC BLOOD PRESSURE: 80 MMHG | TEMPERATURE: 98.6 F | SYSTOLIC BLOOD PRESSURE: 122 MMHG

## 2020-10-21 PROCEDURE — 93000 ELECTROCARDIOGRAM COMPLETE: CPT

## 2020-10-21 PROCEDURE — 99072 ADDL SUPL MATRL&STAF TM PHE: CPT

## 2020-10-21 PROCEDURE — 99214 OFFICE O/P EST MOD 30 MIN: CPT

## 2020-11-02 ENCOUNTER — APPOINTMENT (OUTPATIENT)
Dept: CARDIOLOGY | Facility: CLINIC | Age: 75
End: 2020-11-02
Payer: MEDICARE

## 2020-11-02 PROCEDURE — 93306 TTE W/DOPPLER COMPLETE: CPT

## 2020-11-02 PROCEDURE — 99072 ADDL SUPL MATRL&STAF TM PHE: CPT

## 2020-11-11 ENCOUNTER — RESULT REVIEW (OUTPATIENT)
Age: 75
End: 2020-11-11

## 2020-11-11 ENCOUNTER — OUTPATIENT (OUTPATIENT)
Dept: OUTPATIENT SERVICES | Facility: HOSPITAL | Age: 75
LOS: 1 days | Discharge: HOME | End: 2020-11-11
Payer: COMMERCIAL

## 2020-11-11 DIAGNOSIS — Z90.49 ACQUIRED ABSENCE OF OTHER SPECIFIED PARTS OF DIGESTIVE TRACT: Chronic | ICD-10-CM

## 2020-11-11 DIAGNOSIS — R07.9 CHEST PAIN, UNSPECIFIED: ICD-10-CM

## 2020-11-11 PROCEDURE — 78452 HT MUSCLE IMAGE SPECT MULT: CPT | Mod: 26

## 2020-12-01 ENCOUNTER — APPOINTMENT (OUTPATIENT)
Dept: CARDIOLOGY | Facility: CLINIC | Age: 75
End: 2020-12-01
Payer: MEDICARE

## 2020-12-01 VITALS
SYSTOLIC BLOOD PRESSURE: 130 MMHG | HEIGHT: 68 IN | DIASTOLIC BLOOD PRESSURE: 76 MMHG | BODY MASS INDEX: 25.01 KG/M2 | WEIGHT: 165 LBS | HEART RATE: 82 BPM | TEMPERATURE: 98.4 F

## 2020-12-01 PROCEDURE — 99072 ADDL SUPL MATRL&STAF TM PHE: CPT

## 2020-12-01 PROCEDURE — 99214 OFFICE O/P EST MOD 30 MIN: CPT

## 2020-12-01 PROCEDURE — 93000 ELECTROCARDIOGRAM COMPLETE: CPT

## 2021-02-08 ENCOUNTER — LABORATORY RESULT (OUTPATIENT)
Age: 76
End: 2021-02-08

## 2021-02-18 ENCOUNTER — APPOINTMENT (OUTPATIENT)
Dept: CARDIOLOGY | Facility: CLINIC | Age: 76
End: 2021-02-18

## 2021-03-15 ENCOUNTER — APPOINTMENT (OUTPATIENT)
Dept: CARDIOLOGY | Facility: CLINIC | Age: 76
End: 2021-03-15
Payer: MEDICARE

## 2021-03-15 PROCEDURE — 99072 ADDL SUPL MATRL&STAF TM PHE: CPT

## 2021-03-15 PROCEDURE — 93308 TTE F-UP OR LMTD: CPT

## 2021-03-16 ENCOUNTER — APPOINTMENT (OUTPATIENT)
Dept: CARDIOLOGY | Facility: CLINIC | Age: 76
End: 2021-03-16
Payer: MEDICARE

## 2021-03-16 VITALS
HEART RATE: 79 BPM | BODY MASS INDEX: 25.46 KG/M2 | TEMPERATURE: 98 F | HEIGHT: 68 IN | WEIGHT: 168 LBS | DIASTOLIC BLOOD PRESSURE: 80 MMHG | SYSTOLIC BLOOD PRESSURE: 120 MMHG

## 2021-03-16 PROCEDURE — 99214 OFFICE O/P EST MOD 30 MIN: CPT

## 2021-03-16 PROCEDURE — 99072 ADDL SUPL MATRL&STAF TM PHE: CPT

## 2021-03-16 PROCEDURE — 93000 ELECTROCARDIOGRAM COMPLETE: CPT

## 2021-05-27 ENCOUNTER — RX RENEWAL (OUTPATIENT)
Age: 76
End: 2021-05-27

## 2021-07-10 ENCOUNTER — RX RENEWAL (OUTPATIENT)
Age: 76
End: 2021-07-10

## 2021-08-25 ENCOUNTER — EMERGENCY (EMERGENCY)
Facility: HOSPITAL | Age: 76
LOS: 0 days | Discharge: HOME | End: 2021-08-25
Attending: EMERGENCY MEDICINE | Admitting: EMERGENCY MEDICINE
Payer: MEDICARE

## 2021-08-25 ENCOUNTER — TRANSCRIPTION ENCOUNTER (OUTPATIENT)
Age: 76
End: 2021-08-25

## 2021-08-25 VITALS
RESPIRATION RATE: 20 BRPM | HEART RATE: 97 BPM | OXYGEN SATURATION: 99 % | DIASTOLIC BLOOD PRESSURE: 85 MMHG | SYSTOLIC BLOOD PRESSURE: 119 MMHG | TEMPERATURE: 98 F | WEIGHT: 167.99 LBS

## 2021-08-25 DIAGNOSIS — S22.32XA FRACTURE OF ONE RIB, LEFT SIDE, INITIAL ENCOUNTER FOR CLOSED FRACTURE: ICD-10-CM
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
## 2021-08-25 DIAGNOSIS — I25.10 ATHEROSCLEROTIC HEART DISEASE OF NATIVE CORONARY ARTERY WITHOUT ANGINA PECTORIS: ICD-10-CM

## 2021-08-25 DIAGNOSIS — I10 ESSENTIAL (PRIMARY) HYPERTENSION: ICD-10-CM

## 2021-08-25 DIAGNOSIS — E78.5 HYPERLIPIDEMIA, UNSPECIFIED: ICD-10-CM

## 2021-08-25 DIAGNOSIS — Z90.49 ACQUIRED ABSENCE OF OTHER SPECIFIED PARTS OF DIGESTIVE TRACT: Chronic | ICD-10-CM

## 2021-08-25 DIAGNOSIS — Z79.82 LONG TERM (CURRENT) USE OF ASPIRIN: ICD-10-CM
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
## 2021-08-25 DIAGNOSIS — Z90.49 ACQUIRED ABSENCE OF OTHER SPECIFIED PARTS OF DIGESTIVE TRACT: ICD-10-CM

## 2021-08-25 DIAGNOSIS — R07.81 PLEURODYNIA: ICD-10-CM

## 2021-08-25 LAB
ALBUMIN SERPL ELPH-MCNC: 4.7 G/DL — SIGNIFICANT CHANGE UP (ref 3.5–5.2)
ALP SERPL-CCNC: 92 U/L — SIGNIFICANT CHANGE UP (ref 30–115)
ALT FLD-CCNC: 23 U/L — SIGNIFICANT CHANGE UP (ref 0–41)
ANION GAP SERPL CALC-SCNC: 11 MMOL/L — SIGNIFICANT CHANGE UP (ref 7–14)
AST SERPL-CCNC: 27 U/L — SIGNIFICANT CHANGE UP (ref 0–41)
BASOPHILS # BLD AUTO: 0.03 K/UL — SIGNIFICANT CHANGE UP (ref 0–0.2)
BASOPHILS NFR BLD AUTO: 0.4 % — SIGNIFICANT CHANGE UP (ref 0–1)
BILIRUB SERPL-MCNC: 1.3 MG/DL — HIGH (ref 0.2–1.2)
BUN SERPL-MCNC: 16 MG/DL — SIGNIFICANT CHANGE UP (ref 10–20)
CALCIUM SERPL-MCNC: 10.1 MG/DL — SIGNIFICANT CHANGE UP (ref 8.5–10.1)
CHLORIDE SERPL-SCNC: 104 MMOL/L — SIGNIFICANT CHANGE UP (ref 98–110)
CO2 SERPL-SCNC: 25 MMOL/L — SIGNIFICANT CHANGE UP (ref 17–32)
CREAT SERPL-MCNC: 1 MG/DL — SIGNIFICANT CHANGE UP (ref 0.7–1.5)
EOSINOPHIL # BLD AUTO: 0.11 K/UL — SIGNIFICANT CHANGE UP (ref 0–0.7)
EOSINOPHIL NFR BLD AUTO: 1.3 % — SIGNIFICANT CHANGE UP (ref 0–8)
GLUCOSE SERPL-MCNC: 100 MG/DL — HIGH (ref 70–99)
HCT VFR BLD CALC: 46.2 % — SIGNIFICANT CHANGE UP (ref 42–52)
HGB BLD-MCNC: 16 G/DL — SIGNIFICANT CHANGE UP (ref 14–18)
IMM GRANULOCYTES NFR BLD AUTO: 0.2 % — SIGNIFICANT CHANGE UP (ref 0.1–0.3)
LYMPHOCYTES # BLD AUTO: 2.28 K/UL — SIGNIFICANT CHANGE UP (ref 1.2–3.4)
LYMPHOCYTES # BLD AUTO: 27.2 % — SIGNIFICANT CHANGE UP (ref 20.5–51.1)
MCHC RBC-ENTMCNC: 31 PG — SIGNIFICANT CHANGE UP (ref 27–31)
MCHC RBC-ENTMCNC: 34.6 G/DL — SIGNIFICANT CHANGE UP (ref 32–37)
MCV RBC AUTO: 89.5 FL — SIGNIFICANT CHANGE UP (ref 80–94)
MONOCYTES # BLD AUTO: 0.95 K/UL — HIGH (ref 0.1–0.6)
MONOCYTES NFR BLD AUTO: 11.3 % — HIGH (ref 1.7–9.3)
NEUTROPHILS # BLD AUTO: 4.99 K/UL — SIGNIFICANT CHANGE UP (ref 1.4–6.5)
NEUTROPHILS NFR BLD AUTO: 59.6 % — SIGNIFICANT CHANGE UP (ref 42.2–75.2)
NRBC # BLD: 0 /100 WBCS — SIGNIFICANT CHANGE UP (ref 0–0)
PLATELET # BLD AUTO: 162 K/UL — SIGNIFICANT CHANGE UP (ref 130–400)
POTASSIUM SERPL-MCNC: 4.4 MMOL/L — SIGNIFICANT CHANGE UP (ref 3.5–5)
POTASSIUM SERPL-SCNC: 4.4 MMOL/L — SIGNIFICANT CHANGE UP (ref 3.5–5)
PROT SERPL-MCNC: 8.1 G/DL — HIGH (ref 6–8)
RBC # BLD: 5.16 M/UL — SIGNIFICANT CHANGE UP (ref 4.7–6.1)
RBC # FLD: 12.8 % — SIGNIFICANT CHANGE UP (ref 11.5–14.5)
SODIUM SERPL-SCNC: 140 MMOL/L — SIGNIFICANT CHANGE UP (ref 135–146)
WBC # BLD: 8.38 K/UL — SIGNIFICANT CHANGE UP (ref 4.8–10.8)
WBC # FLD AUTO: 8.38 K/UL — SIGNIFICANT CHANGE UP (ref 4.8–10.8)

## 2021-08-25 PROCEDURE — 99285 EMERGENCY DEPT VISIT HI MDM: CPT

## 2021-08-25 PROCEDURE — 74177 CT ABD & PELVIS W/CONTRAST: CPT | Mod: 26,MA

## 2021-08-25 PROCEDURE — 71260 CT THORAX DX C+: CPT | Mod: 26,MA

## 2021-08-25 NOTE — ED PROVIDER NOTE - ATTENDING CONTRIBUTION TO CARE
slip and fall with traumatic left rib pain, no head injury. pt went to urgent care, had xray showing rib fx and sent here. agree with above exam    since pt with lower rib pain, ct abd and pelvis done to r/o acute internal injury

## 2021-08-25 NOTE — ED ADULT NURSE NOTE - NSICDXPASTMEDICALHX_GEN_ALL_CORE_FT
PAST MEDICAL HISTORY:  CAD (coronary artery disease)     Hypertension     Myocardial infarction

## 2021-08-25 NOTE — ED PROVIDER NOTE - CLINICAL SUMMARY MEDICAL DECISION MAKING FREE TEXT BOX
pt here with left rib pain from fall yesterday. ct thorax and abd pelvis, ct shows an acute appearing fracture of the lateral aspect of the left ninth rib. pt well appearing, given incentive spiromter, HI home

## 2021-08-25 NOTE — ED ADULT NURSE NOTE - NSIMPLEMENTINTERV_GEN_ALL_ED
Implemented All Fall Risk Interventions:  [place] to call system. Call bell, personal items and telephone within reach. Instruct patient to call for assistance. Room bathroom lighting operational. Non-slip footwear when patient is off stretcher. Physically safe environment: no spills, clutter or unnecessary equipment. Stretcher in lowest position, wheels locked, appropriate side rails in place. Provide visual cue, wrist band, yellow gown, etc. Monitor gait and stability. Monitor for mental status changes and reorient to person, place, and time. Review medications for side effects contributing to fall risk. Reinforce activity limits and safety measures with patient and family.

## 2021-08-25 NOTE — ED PROVIDER NOTE - CARE PROVIDER_API CALL
Shane Harper (MD)  Orthopaedic Surgery  [address] [place] [address]  Phone: [phone]  Fax: [phone]  Follow Up Time: 1-3 Days

## 2021-08-25 NOTE — ED PROVIDER NOTE - NS ED ROS FT
Constitutional: (-) fever, (-) chills  Eyes: (-) visual changes  ENT: (-) nasal congestions  Cardiovascular: (-) chest pain, (-) syncope  Respiratory: (-) cough, (-) shortness of breath, (-) dyspnea,   Gastrointestinal: (-) vomiting, (-) diarrhea, (-)nausea,  Musculoskeletal: (-) neck pain, (-) back pain, (-) joint pain, (+) rib pain   Integumentary: (-) rash, (-) edema, (-) bruises  Neurological: (-) headache, (-) loc, (-) dizziness, (-) tingling, (-)numbness,  Peripheral Vascular: (-) leg swelling  :  (-)dysuria,  (-) hematuria  Allergic/Immunologic: (-) pruritus

## 2021-08-25 NOTE — ED PROVIDER NOTE - PATIENT PORTAL LINK FT
You can access the FollowMyHealth Patient Portal offered by [facility] by registering at the following website: [email or web address]. By joining [employer]’s FollowMyHealth portal, you will also be able to view your health information using other applications (apps) compatible with our system.

## 2021-08-25 NOTE — ED PROVIDER NOTE - OBJECTIVE STATEMENT
75 yo male, pmh of cad w stents, htn, hld, presents to ed for fall, pt sent in from [facility] for rib fracture on cxr, fell yesterday on left side, c/o left lower rib pain, mild, aching, no radiation. denies fever, chills, cp, sob, le swelling, abd pain, nvd.

## 2021-08-25 NOTE — ED PROVIDER NOTE - PHYSICAL EXAMINATION
Physical Exam    Vital Signs: I have reviewed the initial vital signs.  Constitutional: well-nourished, appears stated age, no acute distress  Eyes: Conjunctiva pink, Sclera clear.  Cardiovascular: S1 and S2, regular rate, regular rhythm, well-perfused extremities, radial pulses equal and 2+, pedal pulses 2+ and equal    Respiratory: unlabored respiratory effort, clear to auscultation bilaterally no wheezing, rales and rhonchi  Gastrointestinal: soft, non-tender abdomen, no pulsatile mass, normal bowl sounds  Musculoskeletal: supple neck, no lower extremity edema, no midline tenderness, left lateral inferior rib ttp  Integumentary: warm, dry, no rash  Neurologic: awake, alert, nvi

## 2021-08-25 NOTE — ED PROVIDER NOTE - NSFOLLOWUPCLINICS_GEN_ALL_ED_FT
[facility] ([facility])  Rehabilitation  [address] [place] [address]  Phone: [phone]  Fax:   Follow Up Time: 1-3 Days

## 2021-08-25 NOTE — ED ADULT TRIAGE NOTE - CHIEF COMPLAINT QUOTE
"I slipped and fell on sunday, I was at urgy care and they told me I had a left broken rib" Pt on baby asa

## 2021-09-03 ENCOUNTER — LABORATORY RESULT (OUTPATIENT)
Age: 76
End: 2021-09-03

## 2021-09-28 ENCOUNTER — APPOINTMENT (OUTPATIENT)
Dept: CARDIOLOGY | Facility: CLINIC | Age: 76
End: 2021-09-28
Payer: MEDICARE

## 2021-09-28 ENCOUNTER — RESULT CHARGE (OUTPATIENT)
Age: 76
End: 2021-09-28

## 2021-09-28 VITALS
DIASTOLIC BLOOD PRESSURE: 70 MMHG | TEMPERATURE: 98.2 F | SYSTOLIC BLOOD PRESSURE: 100 MMHG | HEIGHT: 68 IN | BODY MASS INDEX: 24.86 KG/M2 | HEART RATE: 86 BPM | OXYGEN SATURATION: 98 % | WEIGHT: 164 LBS

## 2021-09-28 PROCEDURE — 99213 OFFICE O/P EST LOW 20 MIN: CPT

## 2021-09-28 PROCEDURE — 93000 ELECTROCARDIOGRAM COMPLETE: CPT

## 2021-09-28 RX ORDER — ROSUVASTATIN CALCIUM 40 MG/1
40 TABLET, FILM COATED ORAL DAILY
Refills: 0 | Status: DISCONTINUED | COMMUNITY
End: 2021-09-28

## 2021-09-30 ENCOUNTER — OUTPATIENT (OUTPATIENT)
Dept: OUTPATIENT SERVICES | Facility: HOSPITAL | Age: 76
LOS: 1 days | Discharge: HOME | End: 2021-09-30

## 2021-09-30 DIAGNOSIS — H83.09 LABYRINTHITIS, UNSPECIFIED EAR: ICD-10-CM

## 2021-09-30 DIAGNOSIS — R42 DIZZINESS AND GIDDINESS: ICD-10-CM

## 2021-09-30 DIAGNOSIS — Z90.49 ACQUIRED ABSENCE OF OTHER SPECIFIED PARTS OF DIGESTIVE TRACT: Chronic | ICD-10-CM

## 2022-01-04 ENCOUNTER — RX RENEWAL (OUTPATIENT)
Age: 77
End: 2022-01-04

## 2022-03-25 ENCOUNTER — RX RENEWAL (OUTPATIENT)
Age: 77
End: 2022-03-25

## 2022-04-13 ENCOUNTER — APPOINTMENT (OUTPATIENT)
Dept: CARDIOLOGY | Facility: CLINIC | Age: 77
End: 2022-04-13
Payer: MEDICARE

## 2022-04-13 PROCEDURE — 93306 TTE W/DOPPLER COMPLETE: CPT

## 2022-04-14 ENCOUNTER — LABORATORY RESULT (OUTPATIENT)
Age: 77
End: 2022-04-14

## 2022-04-27 ENCOUNTER — APPOINTMENT (OUTPATIENT)
Dept: CARDIOLOGY | Facility: CLINIC | Age: 77
End: 2022-04-27
Payer: MEDICARE

## 2022-04-27 VITALS
BODY MASS INDEX: 25.31 KG/M2 | HEIGHT: 68 IN | WEIGHT: 167 LBS | DIASTOLIC BLOOD PRESSURE: 70 MMHG | SYSTOLIC BLOOD PRESSURE: 122 MMHG

## 2022-04-27 PROCEDURE — 99213 OFFICE O/P EST LOW 20 MIN: CPT

## 2022-04-27 PROCEDURE — 93000 ELECTROCARDIOGRAM COMPLETE: CPT

## 2022-06-19 ENCOUNTER — RX RENEWAL (OUTPATIENT)
Age: 77
End: 2022-06-19

## 2022-06-29 ENCOUNTER — RX RENEWAL (OUTPATIENT)
Age: 77
End: 2022-06-29

## 2022-09-19 ENCOUNTER — LABORATORY RESULT (OUTPATIENT)
Age: 77
End: 2022-09-19

## 2022-09-21 ENCOUNTER — APPOINTMENT (OUTPATIENT)
Dept: CARDIOLOGY | Facility: CLINIC | Age: 77
End: 2022-09-21

## 2022-09-21 PROCEDURE — 93880 EXTRACRANIAL BILAT STUDY: CPT

## 2022-10-06 ENCOUNTER — RESULT CHARGE (OUTPATIENT)
Age: 77
End: 2022-10-06

## 2022-10-06 ENCOUNTER — APPOINTMENT (OUTPATIENT)
Dept: CARDIOLOGY | Facility: CLINIC | Age: 77
End: 2022-10-06

## 2022-10-06 VITALS
SYSTOLIC BLOOD PRESSURE: 124 MMHG | HEART RATE: 94 BPM | WEIGHT: 169 LBS | HEIGHT: 68 IN | BODY MASS INDEX: 25.61 KG/M2 | DIASTOLIC BLOOD PRESSURE: 74 MMHG | OXYGEN SATURATION: 95 %

## 2022-10-06 DIAGNOSIS — Z95.1 PRESENCE OF AORTOCORONARY BYPASS GRAFT: ICD-10-CM

## 2022-10-06 DIAGNOSIS — I25.10 ATHEROSCLEROTIC HEART DISEASE OF NATIVE CORONARY ARTERY W/OUT ANGINA PECTORIS: ICD-10-CM

## 2022-10-06 PROCEDURE — 93000 ELECTROCARDIOGRAM COMPLETE: CPT

## 2022-10-06 PROCEDURE — 99214 OFFICE O/P EST MOD 30 MIN: CPT | Mod: 25

## 2022-10-06 NOTE — ASSESSMENT
[FreeTextEntry1] : Mr. Coulter is a 77-year-old man with history of CAD s/p CABG x2 in [place] 2004 and PCI for inferior STEMI 2018 presenting for follow up in management of CAD.\par \par Impression:\par (1) CAD s/p CABG at [facility] 2004 and inferior STEMI 2018 at [facility], stable, no angina, LDL <70\par (2) Aortic stenosis, moderate\par (3) HTN\par (4) HLD\par \par Plan:\par - History of tobacco use, no AAA screen seen on our EMR. Patient cannot recall if he's ever had one done. Will order for AAA US.\par - TTE prior to next visit for evaluation of AS.\par - Continue DAPT for now given multiple events. Consider discontinuing aspirin if bleeding becomes an issue.\par - Continue metoprolol as an antianginal\par - Continue high intensity statin\par - Continue ramipril for HTN.\par \par RTC 6 months

## 2022-10-06 NOTE — HISTORY OF PRESENT ILLNESS
[FreeTextEntry1] : Mr. Coulter is a 77-year-old man with history of CAD s/p CABG x2 in [facility] 2004 and PCI for inferior STEMI 2018 presenting for follow up in management of CAD.\par \par Patient previously followed with Dr. Bedolla and was last seen in clinic on 4/27/22.\par \par Today, generally feels well, denies active complaints.\par \par Carotid Doppler 9/2022\par - Bilateral <50% stenosis with protuberant plaque.\par \par TTE 4/2022\par - EF 50%, PB hypokinesis, moderate MR, mild AR, moderate AS (Vmax 3.0 m/s, mean PG 15 mmHg)\par \par Nuclear Stress Lexiscan\par - Negative, no changes vs 2019\par \par [facility] 2018 at [facility]\par - 100% LM, LAD, LCx\par - Patent LIMA-diag/LAD\par - RCA occluded in the mid-portion, s/p PCI with MARISOL\par - Distal second PL is occluded, likely thrombus.\par \par Labs:\par - , TG 77, HDL 35, LDL 69, non-HDL 84\par - Cr 0.9

## 2022-10-06 NOTE — PHYSICAL EXAM
[Well Developed] : well developed [Well Nourished] : well nourished [No Acute Distress] : no acute distress [Normal Conjunctiva] : normal conjunctiva [Normal Venous Pressure] : normal venous pressure [No Carotid Bruit] : no carotid bruit [Normal S1, S2] : normal S1, S2 [No Murmur] : no murmur [No Rub] : no rub [No Gallop] : no gallop [Good Air Entry] : good air entry [No Respiratory Distress] : no respiratory distress  [Soft] : abdomen soft [Non Tender] : non-tender [No Masses/organomegaly] : no masses/organomegaly [Normal Bowel Sounds] : normal bowel sounds [Normal Gait] : normal gait [No Edema] : no edema [No Cyanosis] : no cyanosis [No Clubbing] : no clubbing [No Varicosities] : no varicosities [No Rash] : no rash [No Skin Lesions] : no skin lesions [Moves all extremities] : moves all extremities [No Focal Deficits] : no focal deficits [Normal Speech] : normal speech [Alert and Oriented] : alert and oriented [Normal memory] : normal memory [de-identified] : Faint crackles at the right base

## 2023-01-19 NOTE — ED ADULT NURSE NOTE - NS_ED_NURSE_TEACHING_TOPIC_ED_A_ED
Spoke to pt let her know that BCR-ABL remains undetected and she continues to have major molecular response. She confirmed understanding   
Orthopedic

## 2023-02-20 ENCOUNTER — LABORATORY RESULT (OUTPATIENT)
Age: 78
End: 2023-02-20

## 2023-02-23 ENCOUNTER — TRANSCRIPTION ENCOUNTER (OUTPATIENT)
Age: 78
End: 2023-02-23

## 2023-02-23 ENCOUNTER — INPATIENT (INPATIENT)
Facility: HOSPITAL | Age: 78
LOS: 0 days | Discharge: HOME | DRG: 376 | End: 2023-02-24
Attending: INTERNAL MEDICINE | Admitting: INTERNAL MEDICINE
Payer: MEDICARE

## 2023-02-23 ENCOUNTER — RESULT REVIEW (OUTPATIENT)
Age: 78
End: 2023-02-23

## 2023-02-23 VITALS
TEMPERATURE: 98 F | SYSTOLIC BLOOD PRESSURE: 121 MMHG | WEIGHT: 164.91 LBS | RESPIRATION RATE: 18 BRPM | HEART RATE: 94 BPM | HEIGHT: 68 IN | DIASTOLIC BLOOD PRESSURE: 84 MMHG

## 2023-02-23 DIAGNOSIS — K63.5 POLYP OF COLON: ICD-10-CM

## 2023-02-23 DIAGNOSIS — Z95.9 PRESENCE OF CARDIAC AND VASCULAR IMPLANT AND GRAFT, UNSPECIFIED: Chronic | ICD-10-CM

## 2023-02-23 DIAGNOSIS — Z95.1 PRESENCE OF AORTOCORONARY BYPASS GRAFT: Chronic | ICD-10-CM

## 2023-02-23 DIAGNOSIS — I25.10 ATHEROSCLEROTIC HEART DISEASE OF NATIVE CORONARY ARTERY WITHOUT ANGINA PECTORIS: ICD-10-CM

## 2023-02-23 DIAGNOSIS — Z98.62 PERIPHERAL VASCULAR ANGIOPLASTY STATUS: Chronic | ICD-10-CM

## 2023-02-23 DIAGNOSIS — Z90.49 ACQUIRED ABSENCE OF OTHER SPECIFIED PARTS OF DIGESTIVE TRACT: Chronic | ICD-10-CM

## 2023-02-23 LAB
ALBUMIN SERPL ELPH-MCNC: 4 G/DL — SIGNIFICANT CHANGE UP (ref 3.5–5.2)
ALP SERPL-CCNC: 84 U/L — SIGNIFICANT CHANGE UP (ref 30–115)
ALT FLD-CCNC: 19 U/L — SIGNIFICANT CHANGE UP (ref 0–41)
ANION GAP SERPL CALC-SCNC: 12 MMOL/L — SIGNIFICANT CHANGE UP (ref 7–14)
APTT BLD: 28.7 SEC — SIGNIFICANT CHANGE UP (ref 27–39.2)
AST SERPL-CCNC: 27 U/L — SIGNIFICANT CHANGE UP (ref 0–41)
BILIRUB SERPL-MCNC: 1.4 MG/DL — HIGH (ref 0.2–1.2)
BLD GP AB SCN SERPL QL: SIGNIFICANT CHANGE UP
BUN SERPL-MCNC: 23 MG/DL — HIGH (ref 10–20)
CALCIUM SERPL-MCNC: 8.8 MG/DL — SIGNIFICANT CHANGE UP (ref 8.4–10.5)
CHLORIDE SERPL-SCNC: 104 MMOL/L — SIGNIFICANT CHANGE UP (ref 98–110)
CO2 SERPL-SCNC: 22 MMOL/L — SIGNIFICANT CHANGE UP (ref 17–32)
CREAT SERPL-MCNC: 1 MG/DL — SIGNIFICANT CHANGE UP (ref 0.7–1.5)
EGFR: 78 ML/MIN/1.73M2 — SIGNIFICANT CHANGE UP
GLUCOSE SERPL-MCNC: 84 MG/DL — SIGNIFICANT CHANGE UP (ref 70–99)
HCT VFR BLD CALC: 42.5 % — SIGNIFICANT CHANGE UP (ref 42–52)
HGB BLD-MCNC: 14.5 G/DL — SIGNIFICANT CHANGE UP (ref 14–18)
INR BLD: 1.13 RATIO — SIGNIFICANT CHANGE UP (ref 0.65–1.3)
MAGNESIUM SERPL-MCNC: 1.8 MG/DL — SIGNIFICANT CHANGE UP (ref 1.8–2.4)
MCHC RBC-ENTMCNC: 30.6 PG — SIGNIFICANT CHANGE UP (ref 27–31)
MCHC RBC-ENTMCNC: 34.1 G/DL — SIGNIFICANT CHANGE UP (ref 32–37)
MCV RBC AUTO: 89.7 FL — SIGNIFICANT CHANGE UP (ref 80–94)
NRBC # BLD: 0 /100 WBCS — SIGNIFICANT CHANGE UP (ref 0–0)
PLATELET # BLD AUTO: 124 K/UL — LOW (ref 130–400)
POTASSIUM SERPL-MCNC: 4.7 MMOL/L — SIGNIFICANT CHANGE UP (ref 3.5–5)
POTASSIUM SERPL-SCNC: 4.7 MMOL/L — SIGNIFICANT CHANGE UP (ref 3.5–5)
PROT SERPL-MCNC: 6.7 G/DL — SIGNIFICANT CHANGE UP (ref 6–8)
PROTHROM AB SERPL-ACNC: 12.9 SEC — HIGH (ref 9.95–12.87)
RBC # BLD: 4.74 M/UL — SIGNIFICANT CHANGE UP (ref 4.7–6.1)
RBC # FLD: 13.4 % — SIGNIFICANT CHANGE UP (ref 11.5–14.5)
SODIUM SERPL-SCNC: 138 MMOL/L — SIGNIFICANT CHANGE UP (ref 135–146)
WBC # BLD: 13.58 K/UL — HIGH (ref 4.8–10.8)
WBC # FLD AUTO: 13.58 K/UL — HIGH (ref 4.8–10.8)

## 2023-02-23 PROCEDURE — 88341 IMHCHEM/IMCYTCHM EA ADD ANTB: CPT | Mod: 26

## 2023-02-23 PROCEDURE — 85730 THROMBOPLASTIN TIME PARTIAL: CPT

## 2023-02-23 PROCEDURE — 88305 TISSUE EXAM BY PATHOLOGIST: CPT | Mod: 26

## 2023-02-23 PROCEDURE — 80053 COMPREHEN METABOLIC PANEL: CPT

## 2023-02-23 PROCEDURE — 83735 ASSAY OF MAGNESIUM: CPT

## 2023-02-23 PROCEDURE — 74018 RADEX ABDOMEN 1 VIEW: CPT

## 2023-02-23 PROCEDURE — 86803 HEPATITIS C AB TEST: CPT

## 2023-02-23 PROCEDURE — 93005 ELECTROCARDIOGRAM TRACING: CPT

## 2023-02-23 PROCEDURE — 86901 BLOOD TYPING SEROLOGIC RH(D): CPT

## 2023-02-23 PROCEDURE — 85027 COMPLETE CBC AUTOMATED: CPT

## 2023-02-23 PROCEDURE — 36415 COLL VENOUS BLD VENIPUNCTURE: CPT

## 2023-02-23 PROCEDURE — 86900 BLOOD TYPING SEROLOGIC ABO: CPT

## 2023-02-23 PROCEDURE — 86850 RBC ANTIBODY SCREEN: CPT

## 2023-02-23 PROCEDURE — 85610 PROTHROMBIN TIME: CPT

## 2023-02-23 PROCEDURE — 88342 IMHCHEM/IMCYTCHM 1ST ANTB: CPT | Mod: 26

## 2023-02-23 RX ORDER — MORPHINE SULFATE 50 MG/1
1 CAPSULE, EXTENDED RELEASE ORAL EVERY 6 HOURS
Refills: 0 | Status: DISCONTINUED | OUTPATIENT
Start: 2023-02-23 | End: 2023-02-24

## 2023-02-23 RX ORDER — ONDANSETRON 8 MG/1
4 TABLET, FILM COATED ORAL EVERY 8 HOURS
Refills: 0 | Status: DISCONTINUED | OUTPATIENT
Start: 2023-02-23 | End: 2023-02-24

## 2023-02-23 RX ORDER — METOPROLOL TARTRATE 50 MG
25 TABLET ORAL DAILY
Refills: 0 | Status: DISCONTINUED | OUTPATIENT
Start: 2023-02-24 | End: 2023-02-24

## 2023-02-23 RX ORDER — ATORVASTATIN CALCIUM 80 MG/1
80 TABLET, FILM COATED ORAL AT BEDTIME
Refills: 0 | Status: DISCONTINUED | OUTPATIENT
Start: 2023-02-24 | End: 2023-02-24

## 2023-02-23 RX ORDER — EPLERENONE 50 MG/1
1 TABLET, FILM COATED ORAL
Qty: 0 | Refills: 0 | DISCHARGE

## 2023-02-23 RX ORDER — ACETAMINOPHEN 500 MG
1000 TABLET ORAL EVERY 6 HOURS
Refills: 0 | Status: DISCONTINUED | OUTPATIENT
Start: 2023-02-23 | End: 2023-02-24

## 2023-02-23 RX ORDER — SODIUM CHLORIDE 9 MG/ML
1000 INJECTION, SOLUTION INTRAVENOUS
Refills: 0 | Status: DISCONTINUED | OUTPATIENT
Start: 2023-02-23 | End: 2023-02-24

## 2023-02-23 RX ORDER — LISINOPRIL 2.5 MG/1
40 TABLET ORAL DAILY
Refills: 0 | Status: DISCONTINUED | OUTPATIENT
Start: 2023-02-24 | End: 2023-02-24

## 2023-02-23 RX ORDER — TICAGRELOR 90 MG/1
1 TABLET ORAL
Qty: 0 | Refills: 0 | DISCHARGE

## 2023-02-23 RX ORDER — CEFOTETAN DISODIUM 1 G
1 VIAL (EA) INJECTION EVERY 12 HOURS
Refills: 0 | Status: DISCONTINUED | OUTPATIENT
Start: 2023-02-24 | End: 2023-02-24

## 2023-02-23 RX ORDER — MORPHINE SULFATE 50 MG/1
1 CAPSULE, EXTENDED RELEASE ORAL ONCE
Refills: 0 | Status: DISCONTINUED | OUTPATIENT
Start: 2023-02-23 | End: 2023-02-23

## 2023-02-23 RX ADMIN — MORPHINE SULFATE 1 MILLIGRAM(S): 50 CAPSULE, EXTENDED RELEASE ORAL at 14:00

## 2023-02-23 RX ADMIN — MORPHINE SULFATE 1 MILLIGRAM(S): 50 CAPSULE, EXTENDED RELEASE ORAL at 14:23

## 2023-02-23 NOTE — H&P ADULT - NSHPPHYSICALEXAM_GEN_ALL_CORE
VITALS:  T(F): 97.3 (02-23-23 @ 15:50), Max: 97.7 (02-23-23 @ 10:16)  HR: 92 (02-23-23 @ 18:00) (60 - 94)  BP: 124/75 (02-23-23 @ 18:00) (109/68 - 138/78)  RR: 18 (02-23-23 @ 18:00) (14 - 22)  SpO2: 97% (02-23-23 @ 18:00) (94% - 99%)        PHYSICAL EXAM:  GENERAL: NAD, well-developed  CHEST/LUNG: CTAB; No wheeze  HEART: RRR; No murmurs, rubs, or gallops  ABDOMEN: Soft, NT/ND;  decreased Bowel Sounds  EXTREMITIES:  No cyanosis, or edema  NEUROLOGY: AAOx3  SKIN: No rashes or lesions

## 2023-02-23 NOTE — H&P ADULT - HISTORY OF PRESENT ILLNESS
Patient seen in endoscopy suite. s/p morphine for pain. Now denies any active complaints.  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 He had ESD 2/23/23 and procedure went well but he had some pain after.  Temp = 97.7; HR = 94; BP = 121/84; RR = 18, on room air.  Patient received morphine 1 mg x 1.

## 2023-02-23 NOTE — ASU PATIENT PROFILE, ADULT - NSICDXPASTMEDICALHX_GEN_ALL_CORE_FT
PAST MEDICAL HISTORY:  Broken ribs 6 fall from ladder 2019/ 1 from fall in 2021    CAD (coronary artery disease)     Hypertension     Myocardial infarction

## 2023-02-23 NOTE — CONSULT NOTE ADULT - ASSESSMENT
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 He had ESD 2/23/23 and procedure went well but he had some pain after. Patient admitted after procedure as proximity of polyp near Appendix as well as extent of removal would benefit from IV ABX, Bowel rest, IV hydration and monitoring.    S/P ESD of Cecal Polyp  - Offered ESD as he declined Right hemicolectomy    - He declined surgery given age and comorbidities   - NPO tonight  - Please HOLD AC and AP this evening- Pneumatic compression for DVT PPX  - Continue Cefotetan  - No need for imaging at this time  - Can Give Morphine 1mg Every 6 hours for pain but if requires more please contact GI fellow on call and if pain severe would obtain stat CT of Abdomen and pelvis   - PM labs to include CBC, TnS, CMP, and coags  - Last Documented EF was 50%- Cardiology notes in [facility]  - Zofran IV 4mg Q 6 hours prn for nausea  - LR at 75ml/hr   - Will follow closely

## 2023-02-23 NOTE — ASU PATIENT PROFILE, ADULT - NSICDXPASTSURGICALHX_GEN_ALL_CORE_FT
PAST SURGICAL HISTORY:  H/O angioplasty 1 cardiac stent 2018    History of cholecystectomy     S/P arterial stent 2013 right leg?    S/P CABG x 2 2004

## 2023-02-23 NOTE — H&P ADULT - ASSESSMENT
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 He had ESD 2/23/23 and procedure went well but he had some pain after.      # abdominal pain post colonoscopy + ESD of cecal polyp  - NPO tonight  - Please HOLD AC and AP this evening- Pneumatic compression for DVT PPX  - Continue Cefotetan  - Morphine 1mg Every 6 hours for pain but if requires more please contact GI fellow on call and if pain severe would obtain stat CT of Abdomen and pelvis   - PM labs to include CBC, T+S, CMP, and coags  - Last Documented EF was 50%- Cardiology notes in [facility]  - Zofran IV 4mg Q 6 hours prn for nausea  - LR at 75ml/hr     # HTN  # CAD  - c/w home dose toprol xl  - ramipril switched to lisinopril  - crestor -> atorvastatin  - hold aspirin, plavix for now  - xray kub in AM      DVT: SCD  GI: n/a  Diet: npo  Activity: Increase as tolerated  Dispo: Acute for now

## 2023-02-23 NOTE — CONSULT NOTE ADULT - SUBJECTIVE AND OBJECTIVE BOX
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 He had ESD 2/23/23 and procedure went well but he had some pain after.     PAST MEDICAL & SURGICAL HISTORY:  CAD (coronary artery disease)  Hypertension  Broken ribs 6 fall from ladder 2019/ 1 from fall in 2021  History of cholecystectomy  S/P CABG x 2  S/P arterial stent  H/O angioplasty 1 cardiac stent 2018      Allergies  No Known Allergies      Review of Systems  General:  Denies Fatigue, Denies Fever, Denies Weakness ,Denies Weight Loss   HEENT: Denies Trouble Swallowing ,Denies  Sore Throat , Denies Change in hearing/vision/speech ,Denies Dizziness    Cardio: Denies  Chest Pain , Palpitations    Respiratory: Denies worsening of SOB, Denies Cough  Abdomen: See detailed HPI  Neuro: Denies Headache Denies Dizziness, Denies Paresthesias  MSK: Denies pain in Bones/Joints/Muscles   Psych: Patient denies depression, denies suicidal or homicidal ideations  Integ: Patient Denies rash, or new skin lesions mation.      Vital Signs Last 24 Hrs  T(C): 36.2 (23 Feb 2023 13:24), Max: 36.5 (23 Feb 2023 10:16)  T(F): 97.2 (23 Feb 2023 13:24), Max: 97.7 (23 Feb 2023 10:16)  HR: 76 (23 Feb 2023 14:38) (60 - 94)  BP: 123/55 (23 Feb 2023 14:38) (109/68 - 123/55)  BP(mean): --  RR: 22 (23 Feb 2023 14:38) (15 - 22)  SpO2: 97% (23 Feb 2023 14:38) (96% - 98%)    Parameters below as of 23 Feb 2023 14:38  Patient On (Oxygen Delivery Method): room air    Physical Exam  Gen: NAD  Head: NC/AT, no visible deformity  ENT: PERRLA, Sclera  , PERRLA Mucosal Membranes  Cardio: S1/S2 No S3/S4, Regular  Resp: CTA B/L  Abdomen: Soft, ND/TTP on dep palpation lower abdomen   Neuro: AAOx3, Cranial Nerve II-XII intact   Extremities: FROM x 4  Skin: No jaundice, no excoriation

## 2023-02-23 NOTE — H&P PST ADULT - HISTORY OF PRESENT ILLNESS
77 y.o male patient her for Colonoscopy with EMR/ESD for Cecal polyp with path showed HGD and Carcinoma in situ

## 2023-02-23 NOTE — CHART NOTE - NSCHARTNOTEFT_GEN_A_CORE
PACU ANESTHESIA ADMISSION NOTE      Procedure: Colonoscopy with EMR  Post op diagnosis: Colon Polyp      ____  Intubated  TV:______       Rate: ______      FiO2: ______    ____  Patent Airway    ____  Full return of protective reflexes    ____  Full recovery from anesthesia / back to baseline     Vitals:   T: 98          R:  18                BP: 109/68                 Sat: 98%                  P: 69      Mental Status:  ___x_ Awake   _____ Alert   _____ Drowsy   _____ Sedated    Nausea/Vomiting:  __x__ NO  ______Yes,   See Post - Op Orders          Pain Scale (0-10):  _____    Treatment: ____ None    __x__ See Post - Op/PCA Orders    Post - Operative Fluids:   ____ Oral   __x__ See Post - Op Orders    Plan: Discharge:   ____Home       __x___Floor     _____Critical Care    _____  Other:_________________    Comments:

## 2023-02-24 ENCOUNTER — TRANSCRIPTION ENCOUNTER (OUTPATIENT)
Age: 78
End: 2023-02-24

## 2023-02-24 VITALS
HEART RATE: 70 BPM | SYSTOLIC BLOOD PRESSURE: 119 MMHG | TEMPERATURE: 97 F | RESPIRATION RATE: 18 BRPM | DIASTOLIC BLOOD PRESSURE: 66 MMHG

## 2023-02-24 LAB
ALBUMIN SERPL ELPH-MCNC: 3.6 G/DL — SIGNIFICANT CHANGE UP (ref 3.5–5.2)
ALP SERPL-CCNC: 79 U/L — SIGNIFICANT CHANGE UP (ref 30–115)
ALT FLD-CCNC: 19 U/L — SIGNIFICANT CHANGE UP (ref 0–41)
ANION GAP SERPL CALC-SCNC: 12 MMOL/L — SIGNIFICANT CHANGE UP (ref 7–14)
AST SERPL-CCNC: 38 U/L — SIGNIFICANT CHANGE UP (ref 0–41)
BILIRUB SERPL-MCNC: 1.8 MG/DL — HIGH (ref 0.2–1.2)
BUN SERPL-MCNC: 24 MG/DL — HIGH (ref 10–20)
CALCIUM SERPL-MCNC: 8.8 MG/DL — SIGNIFICANT CHANGE UP (ref 8.4–10.4)
CHLORIDE SERPL-SCNC: 106 MMOL/L — SIGNIFICANT CHANGE UP (ref 98–110)
CO2 SERPL-SCNC: 22 MMOL/L — SIGNIFICANT CHANGE UP (ref 17–32)
CREAT SERPL-MCNC: 1 MG/DL — SIGNIFICANT CHANGE UP (ref 0.7–1.5)
EGFR: 78 ML/MIN/1.73M2 — SIGNIFICANT CHANGE UP
GLUCOSE SERPL-MCNC: 99 MG/DL — SIGNIFICANT CHANGE UP (ref 70–99)
HCT VFR BLD CALC: 40.2 % — LOW (ref 42–52)
HCV AB S/CO SERPL IA: 0.04 COI — SIGNIFICANT CHANGE UP
HCV AB SERPL-IMP: SIGNIFICANT CHANGE UP
HGB BLD-MCNC: 13.6 G/DL — LOW (ref 14–18)
MAGNESIUM SERPL-MCNC: 1.8 MG/DL — SIGNIFICANT CHANGE UP (ref 1.8–2.4)
MCHC RBC-ENTMCNC: 30.4 PG — SIGNIFICANT CHANGE UP (ref 27–31)
MCHC RBC-ENTMCNC: 33.8 G/DL — SIGNIFICANT CHANGE UP (ref 32–37)
MCV RBC AUTO: 89.9 FL — SIGNIFICANT CHANGE UP (ref 80–94)
NRBC # BLD: 0 /100 WBCS — SIGNIFICANT CHANGE UP (ref 0–0)
PLATELET # BLD AUTO: 121 K/UL — LOW (ref 130–400)
POTASSIUM SERPL-MCNC: 4.2 MMOL/L — SIGNIFICANT CHANGE UP (ref 3.5–5)
POTASSIUM SERPL-SCNC: 4.2 MMOL/L — SIGNIFICANT CHANGE UP (ref 3.5–5)
PROT SERPL-MCNC: 6.5 G/DL — SIGNIFICANT CHANGE UP (ref 6–8)
RBC # BLD: 4.47 M/UL — LOW (ref 4.7–6.1)
RBC # FLD: 13.4 % — SIGNIFICANT CHANGE UP (ref 11.5–14.5)
SODIUM SERPL-SCNC: 140 MMOL/L — SIGNIFICANT CHANGE UP (ref 135–146)
WBC # BLD: 9.81 K/UL — SIGNIFICANT CHANGE UP (ref 4.8–10.8)
WBC # FLD AUTO: 9.81 K/UL — SIGNIFICANT CHANGE UP (ref 4.8–10.8)

## 2023-02-24 PROCEDURE — 74018 RADEX ABDOMEN 1 VIEW: CPT | Mod: 26

## 2023-02-24 PROCEDURE — 99222 1ST HOSP IP/OBS MODERATE 55: CPT

## 2023-02-24 PROCEDURE — 93010 ELECTROCARDIOGRAM REPORT: CPT

## 2023-02-24 PROCEDURE — 99232 SBSQ HOSP IP/OBS MODERATE 35: CPT

## 2023-02-24 RX ORDER — LANOLIN ALCOHOL/MO/W.PET/CERES
5 CREAM (GRAM) TOPICAL AT BEDTIME
Refills: 0 | Status: DISCONTINUED | OUTPATIENT
Start: 2023-02-24 | End: 2023-02-24

## 2023-02-24 RX ORDER — CIPROFLOXACIN LACTATE 400MG/40ML
1 VIAL (ML) INTRAVENOUS
Qty: 20 | Refills: 0
Start: 2023-02-24 | End: 2023-03-05

## 2023-02-24 RX ORDER — METRONIDAZOLE 500 MG
1 TABLET ORAL
Qty: 30 | Refills: 0
Start: 2023-02-24 | End: 2023-03-05

## 2023-02-24 RX ADMIN — Medication 100 GRAM(S): at 01:11

## 2023-02-24 RX ADMIN — SODIUM CHLORIDE 75 MILLILITER(S): 9 INJECTION, SOLUTION INTRAVENOUS at 01:12

## 2023-02-24 RX ADMIN — Medication 100 GRAM(S): at 14:31

## 2023-02-24 RX ADMIN — Medication 25 MILLIGRAM(S): at 05:19

## 2023-02-24 NOTE — PROGRESS NOTE ADULT - SUBJECTIVE AND OBJECTIVE BOX
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 He had ESD 2/23/23 and procedure went well but he had some pain after. He only required in total 1mg of Morphine post procedure and has been feeling fine since. He is passing gas, no Melena or blood per rectum. He has no abdominal pain, nausea, emesis, fever, or chills.     PAST MEDICAL & SURGICAL HISTORY:  CAD (coronary artery disease)  Hypertension  Broken ribs 6 fall from ladder 2019/ 1 from fall in 2021  History of cholecystectomy  S/P CABG x 2  S/P arterial stent  H/O angioplasty 1 cardiac stent 2018      Allergies  No Known Allergies      Review of Systems  General:  Denies Fatigue, Denies Fever, Denies Weakness ,Denies Weight Loss   HEENT: Denies Trouble Swallowing ,Denies  Sore Throat , Denies Change in hearing/vision/speech ,Denies Dizziness    Cardio: Denies  Chest Pain , Palpitations    Respiratory: Denies worsening of SOB, Denies Cough  Abdomen: See detailed HPI  Neuro: Denies Headache Denies Dizziness, Denies Paresthesias  MSK: Denies pain in Bones/Joints/Muscles   Psych: Patient denies depression, denies suicidal or homicidal ideations  Integ: Patient Denies rash, or new skin lesions mation.      Vital Signs Last 24 Hrs  T(C): 36.1 (24 Feb 2023 05:00), Max: 36.5 (23 Feb 2023 10:16)  T(F): 97 (24 Feb 2023 05:00), Max: 97.7 (23 Feb 2023 10:16)  HR: 71 (24 Feb 2023 06:43) (60 - 104)  BP: 103/60 (24 Feb 2023 06:43) (96/51 - 138/78)  BP(mean): 68 (24 Feb 2023 05:00) (68 - 76)  RR: 17 (24 Feb 2023 06:43) (14 - 22)  SpO2: 98% (23 Feb 2023 18:45) (94% - 99%)    Parameters below as of 23 Feb 2023 17:00  Patient On (Oxygen Delivery Method): room air      Physical Exam  Gen: NAD  Head: NC/AT, no visible deformity  ENT: PERRLA, Sclera  , PERRLA Mucosal Membranes  Cardio: S1/S2 No S3/S4, Regular  Resp: CTA B/L  Abdomen: Soft, ND/TTP on dep palpation lower abdomen   Neuro: AAOx3, Cranial Nerve II-XII intact   Extremities: FROM x 4  Skin: No jaundice, no excoriation     Labs:                        14.5   13.58 )-----------( 124      ( 23 Feb 2023 21:59 )             42.5     02-23    138  |  104  |  23<H>  ----------------------------<  84  4.7   |  22  |  1.0    Ca    8.8      23 Feb 2023 21:59  Mg     1.8     02-23    TPro  6.7  /  Alb  4.0  /  TBili  1.4<H>  /  DBili  x   /  AST  27  /  ALT  19  /  AlkPhos  84  02-23

## 2023-02-24 NOTE — DISCHARGE NOTE PROVIDER - CARE PROVIDERS DIRECT ADDRESSES
,[email or web address]

## 2023-02-24 NOTE — DISCHARGE NOTE PROVIDER - HOSPITAL COURSE
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 He had ESD 2/23/23 and procedure went well but he had some pain after. He only required in total 1mg of Morphine post procedure and has been feeling fine since. He is passing gas, no Melena or blood per rectum. He has no abdominal pain, nausea, emesis, fever, or chills.     Discharge a/p:  # abdominal pain post colonoscopy + ESD of cecal polyp  - Last Documented EF was 50%- Cardiology notes in [facility]  - Offered ESD as he declined Right hemicolectomy    - He declined surgery given age and comorbidities   - May start clear liquids and advanced to full liquids if tolerates- no solids today   - discharge on Cipro and flagyl for 10 days   - No need for imaging at this time  - He can resume his AC/AP upon discharge    # HTN  # CAD  - c/w home dose toprol xl  - c/w ramipril  - c/w Statin  - hold aspirin, plavix for now- continue on discharge      Patient is medically stable and ready for discharge.

## 2023-02-24 NOTE — DISCHARGE NOTE NURSING/CASE MANAGEMENT/SOCIAL WORK - PATIENT PORTAL LINK FT
You can access the FollowMyHealth Patient Portal offered by [facility] by registering at the following website: [email or web address]. By joining [facility]’s FollowMyHealth portal, you will also be able to view your health information using other applications (apps) compatible with our system.

## 2023-02-24 NOTE — DISCHARGE NOTE PROVIDER - NSDCFUSCHEDAPPT_GEN_ALL_CORE_FT
[facility]  CARDIOLOGY [address]  Scheduled Appointment: 04/26/2023    [facility]  CARDIOLOGY [address]  Scheduled Appointment: 04/26/2023    Christo Jha  [facility]  CARDIOLOGY [address]  Scheduled Appointment: 05/05/2023

## 2023-02-24 NOTE — DISCHARGE NOTE PROVIDER - NSDCMRMEDTOKEN_GEN_ALL_CORE_FT
aspirin 81 mg oral tablet: 1 tab(s) orally once a day  ciprofloxacin 500 mg oral tablet, extended release: 1 tab(s) orally 2 times a day   clopidogrel 75 mg oral tablet: 1 tab(s) orally once a day  Crestor 40 mg oral tablet: 1 tab(s) orally once a day  metoprolol succinate 25 mg oral tablet, extended release: 1 tab(s) orally once a day  metroNIDAZOLE 500 mg oral tablet: 1 tab(s) orally 3 times a day   ramipril 10 mg oral capsule: 1 cap(s) orally once a day

## 2023-02-24 NOTE — DISCHARGE NOTE PROVIDER - PROVIDER TOKENS
PROVIDER:[TOKEN:[[id number]],FOLLOWUP:[1 week],ESTABLISHEDPATIENT:[T]],PROVIDER:[TOKEN:[[id number]],FOLLOWUP:[1 week]]

## 2023-02-24 NOTE — DISCHARGE NOTE PROVIDER - NSDCCPCAREPLAN_GEN_ALL_CORE_FT
PRINCIPAL DISCHARGE DIAGNOSIS  Diagnosis: Cecal polyp  Assessment and Plan of Treatment: You had a polyp in your cecum, the polyp was at a difficult location, so you were admitted for monitoring, you were stable and are being discharged on 10 days of prophylactic antibiotics. Please visit ED or contact your GI doctor if your have any bleeding through rectum, dark stools or dizziness.

## 2023-02-24 NOTE — DISCHARGE NOTE PROVIDER - CARE PROVIDER_API CALL
Maurice Taylor)  Geriatric Medicine; Internal Medicine  [address] [place] [address]  Phone: [phone]  Fax: [phone]  Established Patient  Follow Up Time: 1 week    Dino Ellis)  Gastroenterology; Internal Medicine  [address] [place] [address]  Phone: [phone]  Fax: [phone]  Follow Up Time: 1 week

## 2023-02-24 NOTE — DISCHARGE NOTE PROVIDER - ATTENDING DISCHARGE PHYSICAL EXAMINATION:
GENERAL: NAD, well-developed  CHEST/LUNG: CTAB; No wheeze  HEART: RRR; No murmurs, rubs, or gallops  ABDOMEN: Soft, NT/ND;  decreased Bowel Sounds  EXTREMITIES:  No cyanosis, or edema  NEUROLOGY: AAOx3  SKIN: No rashes or lesions

## 2023-02-24 NOTE — PROGRESS NOTE ADULT - ASSESSMENT
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 He had ESD 2/23/23 and procedure went well but he had some pain after. He only required in total 1mg of Morphine post procedure and has been feeling fine since. He is passing gas, no Melena or blood per rectum. He has no abdominal pain, nausea, emesis, fever, or chills. May start clear liquid diet. If AM labs are stable can anticipate discharge later today.       S/P ESD of Cecal Polyp  - Offered ESD as he declined Right hemicolectomy    - He declined surgery given age and comorbidities   - May start clear liquids and advanced to full liquids if tolerates- no solids today   - Continue Cefotetan- discharge on Cipro and flagyl for 10 days   - No need for imaging at this time  - He can resume his AC/AP upon discharge  - If labs stable from this morning and tolerating full liquids anticipate discharge early afternoon

## 2023-03-01 DIAGNOSIS — Z95.5 PRESENCE OF CORONARY ANGIOPLASTY IMPLANT AND GRAFT: ICD-10-CM

## 2023-03-01 DIAGNOSIS — Z79.82 LONG TERM (CURRENT) USE OF ASPIRIN: ICD-10-CM

## 2023-03-01 DIAGNOSIS — F17.200 NICOTINE DEPENDENCE, UNSPECIFIED, UNCOMPLICATED: ICD-10-CM

## 2023-03-01 DIAGNOSIS — Z79.02 LONG TERM (CURRENT) USE OF ANTITHROMBOTICS/ANTIPLATELETS: ICD-10-CM
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
## 2023-03-01 DIAGNOSIS — R10.9 UNSPECIFIED ABDOMINAL PAIN: ICD-10-CM

## 2023-03-01 DIAGNOSIS — I25.2 OLD MYOCARDIAL INFARCTION: ICD-10-CM
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
## 2023-03-01 DIAGNOSIS — I25.10 ATHEROSCLEROTIC HEART DISEASE OF NATIVE CORONARY ARTERY WITHOUT ANGINA PECTORIS: ICD-10-CM

## 2023-03-01 DIAGNOSIS — Z90.49 ACQUIRED ABSENCE OF OTHER SPECIFIED PARTS OF DIGESTIVE TRACT: ICD-10-CM

## 2023-03-01 DIAGNOSIS — D01.0 CARCINOMA IN SITU OF COLON: ICD-10-CM

## 2023-03-01 DIAGNOSIS — Z92.29 PERSONAL HISTORY OF OTHER DRUG THERAPY: ICD-10-CM

## 2023-03-01 DIAGNOSIS — K64.1 SECOND DEGREE HEMORRHOIDS: ICD-10-CM

## 2023-03-01 DIAGNOSIS — G89.18 OTHER ACUTE POSTPROCEDURAL PAIN: ICD-10-CM

## 2023-03-01 DIAGNOSIS — I10 ESSENTIAL (PRIMARY) HYPERTENSION: ICD-10-CM

## 2023-03-03 PROBLEM — S22.49XA MULTIPLE FRACTURES OF RIBS, UNSPECIFIED SIDE, INITIAL ENCOUNTER FOR CLOSED FRACTURE: Chronic | Status: ACTIVE | Noted: 2023-02-23

## 2023-03-03 LAB — SURGICAL PATHOLOGY STUDY: SIGNIFICANT CHANGE UP

## 2023-03-29 ENCOUNTER — APPOINTMENT (OUTPATIENT)
Dept: GASTROENTEROLOGY | Facility: CLINIC | Age: 78
End: 2023-03-29

## 2023-04-12 ENCOUNTER — APPOINTMENT (OUTPATIENT)
Dept: SURGERY | Facility: CLINIC | Age: 78
End: 2023-04-12
Payer: MEDICARE

## 2023-04-12 VITALS
TEMPERATURE: 97 F | WEIGHT: 162 LBS | DIASTOLIC BLOOD PRESSURE: 84 MMHG | SYSTOLIC BLOOD PRESSURE: 122 MMHG | HEART RATE: 110 BPM | HEIGHT: 68 IN | BODY MASS INDEX: 24.55 KG/M2 | OXYGEN SATURATION: 97 %

## 2023-04-12 PROCEDURE — 99203 OFFICE O/P NEW LOW 30 MIN: CPT

## 2023-04-13 ENCOUNTER — LABORATORY RESULT (OUTPATIENT)
Age: 78
End: 2023-04-13

## 2023-04-26 ENCOUNTER — APPOINTMENT (OUTPATIENT)
Dept: CARDIOLOGY | Facility: CLINIC | Age: 78
End: 2023-04-26
Payer: MEDICARE

## 2023-04-26 PROCEDURE — 93306 TTE W/DOPPLER COMPLETE: CPT

## 2023-04-26 PROCEDURE — 93978 VASCULAR STUDY: CPT

## 2023-04-30 NOTE — PHYSICAL EXAM
[Respiratory Effort] : normal respiratory effort [Normal Rate and Rhythm] : normal rate and rhythm [de-identified] : Soft, nontender, nondistended. [de-identified] : Awake, alert, no acute distress

## 2023-04-30 NOTE — ASSESSMENT
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  Given the pathology report, he does not carry any significant high-risk features warranting right hemicolectomy. At this time, I recommended observation. He will follow up with Dr. Ellis and Dr. Flores for surveillance colonoscopies. We will see him back as needed.

## 2023-04-30 NOTE — HISTORY OF PRESENT ILLNESS
[FreeTextEntry1] : Patient is a 77M with a past medical history of hypertension, coronary artery disease s/p CABG and heart stent on Plavix, hyperlipidemia, past surgical history of cholecystectomy, who presents for evaluation of adenocarcinoma in ascending colon polyp. Patient originally underwent colonoscopy with Dr. Flores and was found to have a cecal polyp. He was referred to Dr. Ellis, who performed EMR on 2/23/2023.\par The cecal polyp was removed, and pathology showed high grade dysplasia with intramucosal carcinoma arising in a tubulovillous adenoma. Lymphovascular invasion was not present. The deep margin was negative for carcinoma. He presents today for evaluation. Patient is in his normal state of health. He does report an increased number of bowel movements. Since the EMR, he did receive antibiotics post procedure. He denies recent fevers, chills, nausea, vomiting, abdominal pain. He denies a family history of colon cancer, rectal cancer, inflammatory bowel disease,

## 2023-05-03 ENCOUNTER — APPOINTMENT (OUTPATIENT)
Dept: GASTROENTEROLOGY | Facility: CLINIC | Age: 78
End: 2023-05-03
Payer: MEDICARE

## 2023-05-03 VITALS
BODY MASS INDEX: 23.98 KG/M2 | OXYGEN SATURATION: 97 % | HEART RATE: 84 BPM | SYSTOLIC BLOOD PRESSURE: 103 MMHG | WEIGHT: 158.2 LBS | DIASTOLIC BLOOD PRESSURE: 80 MMHG | HEIGHT: 68 IN

## 2023-05-03 DIAGNOSIS — C18.2 MALIGNANT NEOPLASM OF ASCENDING COLON: ICD-10-CM

## 2023-05-03 PROCEDURE — 99214 OFFICE O/P EST MOD 30 MIN: CPT

## 2023-05-03 NOTE — ASSESSMENT
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 He had ESD 2/23/23 and procedure went well but he had some pain after. He developed C-Diff after in which he was treated with Dificid. Feels well. \par \par Procedure and Pathology discussed with patient and wife \par Seen By Dr. Clifton\par Repeat colon set for Saturday May 13th\par \par

## 2023-05-03 NOTE — PHYSICAL EXAM

## 2023-05-03 NOTE — HISTORY OF PRESENT ILLNESS
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 He had ESD 2/23/23 and procedure went well but he had some pain after. He developed C-Diff after in which he was treated with Dificid. Feels well. \par \par

## 2023-05-04 ENCOUNTER — APPOINTMENT (OUTPATIENT)
Dept: CARDIOLOGY | Facility: CLINIC | Age: 78
End: 2023-05-04
Payer: MEDICARE

## 2023-05-04 ENCOUNTER — APPOINTMENT (OUTPATIENT)
Dept: CARDIOTHORACIC SURGERY | Facility: CLINIC | Age: 78
End: 2023-05-04
Payer: MEDICARE

## 2023-05-04 VITALS
HEART RATE: 83 BPM | RESPIRATION RATE: 12 BRPM | BODY MASS INDEX: 24.55 KG/M2 | DIASTOLIC BLOOD PRESSURE: 78 MMHG | WEIGHT: 162 LBS | SYSTOLIC BLOOD PRESSURE: 112 MMHG | HEIGHT: 68 IN | TEMPERATURE: 97.8 F | OXYGEN SATURATION: 97 %

## 2023-05-04 VITALS
BODY MASS INDEX: 24.55 KG/M2 | SYSTOLIC BLOOD PRESSURE: 118 MMHG | HEART RATE: 88 BPM | HEIGHT: 68 IN | WEIGHT: 162 LBS | DIASTOLIC BLOOD PRESSURE: 70 MMHG

## 2023-05-04 VITALS
OXYGEN SATURATION: 97 % | BODY MASS INDEX: 24.55 KG/M2 | HEIGHT: 68 IN | SYSTOLIC BLOOD PRESSURE: 112 MMHG | HEART RATE: 83 BPM | RESPIRATION RATE: 12 BRPM | WEIGHT: 162 LBS | DIASTOLIC BLOOD PRESSURE: 78 MMHG | TEMPERATURE: 97.8 F

## 2023-05-04 PROCEDURE — 99214 OFFICE O/P EST MOD 30 MIN: CPT | Mod: 25

## 2023-05-04 PROCEDURE — 93000 ELECTROCARDIOGRAM COMPLETE: CPT

## 2023-05-04 PROCEDURE — 99203 OFFICE O/P NEW LOW 30 MIN: CPT

## 2023-05-04 PROCEDURE — 99214 OFFICE O/P EST MOD 30 MIN: CPT

## 2023-05-04 NOTE — ASSESSMENT
[FreeTextEntry1] : Mr. JEAN PAUL 77 year M arrives today for evaluation of their Aortic Stenosis. Patient PMH include CAD s/p CABG X 2 at [facility], CAD s/p Stent 2019, and PAD s/p Stent 2018. Symptoms include SOB, fatigue. NYHA class II. Patient lives home alone no aid. Patient saw his cardiologist and his most current echo shows worsening of the aortic valve. He arrives today for further evaluation. \par \par I had a chance to review the patient's echocardiogram and it does show moderate aortic stenosis with a peak aortic velocity of 3 m/s, and a mean aortic valve gradient of more than 20 mmHg.\par \par The patient will need additional testing and probably some provocative testing to see if he has any change in his gradients with possibly dobutamine and also we will determine if he has any residual coronary artery disease to explain his symptomatology.\par \par We also discussed that the patient could possibly be enrolled in the moderate aortic stenosis trials based on the results of his cardiac catheterization and additional interventional studies at the time of his cardiac catheterization when we will get direct pressure measurement gradients and possibly challenge the valve to see if there is change in gradients with dobutamine etc.\par \par The patient himself had multiple opportunities to ask questions and these were all answered and I spent a significant amount of time with the patient and more than 50% of my time was spent answering questions, counseling and coordinating his care.\par \par Plan:\par #CAD/ Aortic Stenosis\par Plan for Cardiac Cath to evaluate Stent and Bypass and cross the valve for gradients\par Labs today\par \par #HTN\par Continue management by general cardiology\par Continue current antihypertensives.\par \par #polyp\par Colonoscopy done \par \par I SYDNI Barajas-BC am acting as the scribe for Dr. Hernandez \par

## 2023-05-04 NOTE — PHYSICAL EXAM
[General Appearance - Alert] : alert [General Appearance - In No Acute Distress] : in no acute distress [Sclera] : the sclera and conjunctiva were normal [PERRL With Normal Accommodation] : pupils were equal in size, round, and reactive to light [Extraocular Movements] : extraocular movements were intact [Outer Ear] : the ears and nose were normal in appearance [Oropharynx] : the oropharynx was normal [Neck Appearance] : the appearance of the neck was normal [Auscultation Breath Sounds / Voice Sounds] : lungs were clear to auscultation bilaterally [Normal Rate] : normal [IV] : a grade 4 [Bowel Sounds] : normal bowel sounds [Abdomen Soft] : soft [Abdomen Tenderness] : non-tender [Abdomen Mass (___ Cm)] : no abdominal mass palpated [Abnormal Walk] : normal gait [Nail Clubbing] : no clubbing  or cyanosis of the fingernails [Musculoskeletal - Swelling] : no joint swelling seen [Motor Tone] : muscle strength and tone were normal [Skin Color & Pigmentation] : normal skin color and pigmentation [Skin Turgor] : normal skin turgor [] : no rash [Deep Tendon Reflexes (DTR)] : deep tendon reflexes were 2+ and symmetric [Sensation] : the sensory exam was normal to light touch and pinprick [No Focal Deficits] : no focal deficits [Oriented To Time, Place, And Person] : oriented to person, place, and time [Impaired Insight] : insight and judgment were intact [Affect] : the affect was normal [General Appearance - Well Nourished] : well nourished [Breast Appearance] : normal in appearance [Breast Palpation Mass] : no palpable masses [Cervical Lymph Nodes Enlarged Posterior Bilaterally] : posterior cervical [No CVA Tenderness] : no ~M costovertebral angle tenderness [FreeTextEntry1] : Examined

## 2023-05-04 NOTE — HISTORY OF PRESENT ILLNESS
[FreeTextEntry1] : Mr. JEAN PAUL 77 year M arrives today for evaluation of their Aortic Stenosis. Patient PMH include CAD s/p CABG X 2 at [facility], CAD s/p Stent 2019, and PAD s/p Stent 2018. Symptoms include SOB, fatigue. NYHA class II. Patient lives home alone no aid. Patient saw his cardiologist and his most current echo shows worsening of the aortic valve. He arrives today for further evaluation. \par \par The patient is a 77-year-old gentleman who has a long history of cardiac interventions having undergone open heart surgery with coronary artery bypass grafting x2 through a minithoracotomy approach by Dr. Martell in 2004.\par \par Since then he has had multiple coronary angiograms and apparently had additional stenting in 2013.\par \par In 2018 apparently he had an interventional vascular procedure with stenting at [facility].  He was apparently visiting his son who lives in [place] at the time and underwent an urgent procedure and he tells me that there was possibly some intervention on his leg also.  As he is known to have peripheral arterial disease.\par \par He tells me that he has had chronic murmur for many years and was told about it all his adult life.\par \par Recently he had been complaining of some vague shortness of breath with dyspnea on exertion with at least class II symptoms and some vague chest pain also.  He denied any syncope, presyncope, palpitations, edema etc.\par \par He recently underwent a colonoscopy and was told that he had a cancerous polyp and he is being followed up by the GI physicians for the same.\par \par They're healthcare team includes the following\par PMD: Ana\par Cardio: Yudelka \par \par

## 2023-05-04 NOTE — REVIEW OF SYSTEMS
[Feeling Poorly] : feeling poorly [Feeling Tired] : feeling tired [Negative] : Heme/Lymph [As noted in HPI] : as noted in HPI [Chest Pain] : chest pain [FreeTextEntry5] : Shortness of breath

## 2023-05-05 LAB
ALBUMIN SERPL ELPH-MCNC: 4.7 G/DL
ALP BLD-CCNC: 95 U/L
ALT SERPL-CCNC: 22 U/L
ANION GAP SERPL CALC-SCNC: 16 MMOL/L
AST SERPL-CCNC: 25 U/L
BASOPHILS # BLD AUTO: 0.03 K/UL
BASOPHILS NFR BLD AUTO: 0.3 %
BILIRUB SERPL-MCNC: 1.3 MG/DL
BUN SERPL-MCNC: 14 MG/DL
CALCIUM SERPL-MCNC: 9.8 MG/DL
CHLORIDE SERPL-SCNC: 100 MMOL/L
CO2 SERPL-SCNC: 23 MMOL/L
CREAT SERPL-MCNC: 0.9 MG/DL
EGFR: 89 ML/MIN/1.73M2
EOSINOPHIL # BLD AUTO: 0.08 K/UL
EOSINOPHIL NFR BLD AUTO: 0.9 %
GLUCOSE SERPL-MCNC: 102 MG/DL
HCT VFR BLD CALC: 47.1 %
HGB BLD-MCNC: 15.5 G/DL
IMM GRANULOCYTES NFR BLD AUTO: 0.2 %
INR PPP: 0.98 RATIO
LYMPHOCYTES # BLD AUTO: 2.55 K/UL
LYMPHOCYTES NFR BLD AUTO: 29.4 %
MAN DIFF?: NORMAL
MCHC RBC-ENTMCNC: 30.6 PG
MCHC RBC-ENTMCNC: 32.9 G/DL
MCV RBC AUTO: 93.1 FL
MONOCYTES # BLD AUTO: 1.04 K/UL
MONOCYTES NFR BLD AUTO: 12 %
NEUTROPHILS # BLD AUTO: 4.94 K/UL
NEUTROPHILS NFR BLD AUTO: 57.2 %
PLATELET # BLD AUTO: 173 K/UL
POTASSIUM SERPL-SCNC: 4.7 MMOL/L
PROT SERPL-MCNC: 7.5 G/DL
PT BLD: 11.2 SEC
RBC # BLD: 5.06 M/UL
RBC # FLD: 14 %
SODIUM SERPL-SCNC: 139 MMOL/L
WBC # FLD AUTO: 8.66 K/UL

## 2023-05-05 NOTE — ASSESSMENT
[FreeTextEntry1] : Mr. Coulter is a 77-year-old man with history of CAD s/p CABG x2 in [facility] 2004 and PCI for inferior STEMI 2018 presenting for follow up in management of CAD.\par \par Impression:\par (1) CAD s/p CABG at [facility] 2004 and inferior STEMI 2018 at [facility], stable, no angina, LDL <70\par (2) Aortic stenosis, moderate to severe, progressing over the past year with PINKY in the severe range (Vmax 3.0, mPG 20, AV 0.83)\par (3) HTN\par (4) HLD\par (5) Mild AAA, infrarenal measuring 2.6x2.9cm\par (6) Carotid artery disease with protuberant plaque\par \par Plan:\par - Discussed his diagnosis of moderate to severe AS in detail. Will refer to the structural heart clinic for consideration for TAVR.\par - TTE q6 months\par - Annual carotids\par - Continue DAPT for now given multiple events. Consider discontinuing aspirin if bleeding becomes an issue.\par - Continue metoprolol as an antianginal\par - Continue high intensity statin\par - Continue ramipril for HTN.\par \par RTC 6 months

## 2023-05-05 NOTE — PHYSICAL EXAM
[Well Developed] : well developed [Well Nourished] : well nourished [No Acute Distress] : no acute distress [Normal Conjunctiva] : normal conjunctiva [Normal Venous Pressure] : normal venous pressure [No Carotid Bruit] : no carotid bruit [Normal S1, S2] : normal S1, S2 [No Murmur] : no murmur [No Rub] : no rub [No Gallop] : no gallop [Good Air Entry] : good air entry [No Respiratory Distress] : no respiratory distress  [Soft] : abdomen soft [Non Tender] : non-tender [No Masses/organomegaly] : no masses/organomegaly [Normal Bowel Sounds] : normal bowel sounds [Normal Gait] : normal gait [No Edema] : no edema [No Cyanosis] : no cyanosis [No Clubbing] : no clubbing [No Varicosities] : no varicosities [No Rash] : no rash [No Skin Lesions] : no skin lesions [Moves all extremities] : moves all extremities [No Focal Deficits] : no focal deficits [Normal Speech] : normal speech [Alert and Oriented] : alert and oriented [Normal memory] : normal memory [de-identified] : Faint crackles at the right base

## 2023-05-05 NOTE — HISTORY OF PRESENT ILLNESS
[FreeTextEntry1] : Mr. Coulter is a 77-year-old man with history of CAD s/p CABG x2 in [facility] 2004 and PCI for inferior STEMI 2018 presenting for follow up in management of CAD.\par \par Patient previously followed with Dr. Bedolla and was last seen in clinic on 4/27/22.\par \par Today, generally feels well but he does note that he is becoming more fatigued/short of breath when he goes for a walk.\par \par Carotid Doppler 9/2022\par - Bilateral <50% stenosis with protuberant plaque.\par \par TTE 4/2023\par - EF 56%, unchanged RWMAs, mod-severe AS (Vmax 3.05m/s, mean PG 20.1mmHg, DI 0.25, PINKY 0.83), mild AI\par \par TTE 4/2022\par - EF 50%, PB hypokinesis, moderate MR, mild AR, moderate AS (Vmax 3.0 m/s, mean PG 15 mmHg)\par \par Nuclear Stress Lexiscan\par - Negative, no changes vs 2019\par \par LHC 2018 at [facility]\par - 100% LM, LAD, LCx\par - Patent LIMA-diag/LAD\par - RCA occluded in the mid-portion, s/p PCI with MARISOL\par - Distal second PL is occluded, likely thrombus.\par \par AAA US\par - Infrarenal aorta with mild dilatation (2.6 x 2.9cm)\par \par Labs:\par - , TG 60, HDL 36, LDL 61, non-HDL 73\par - Cr 1.0, K 4.2

## 2023-05-14 NOTE — ASSESSMENT
[FreeTextEntry1] : Mr. JEAN PAUL 77 year M arrives today for evaluation of their Aortic Stenosis. Patient PMH include CAD s/p CABG X 2 at [facility], CAD s/p Stent 2019, and PAD s/p Stent 2018. Symptoms include SOB, fatigue. NYHA class II. Patient lives home alone no aid. Patient saw his cardiologist and his most current echo shows worsening of the aortic valve. He arrives today for further evaluation. \par \par \par \par Plan:\par #CAD/ Aortic Stenosis\par Plan for Cardiac Cath to evaluate Stent and Bypass and cross the valve for gradients\par Labs today\par \par #HTN\par Continue management by general cardiology\par Continue current antihypertensives.\par \par #polyp\par Colonoscopy done \par

## 2023-05-14 NOTE — PHYSICAL EXAM
[Well Developed] : well developed [Well Nourished] : well nourished [No Acute Distress] : no acute distress [Normal Conjunctiva] : normal conjunctiva [Normal Venous Pressure] : normal venous pressure [No Carotid Bruit] : no carotid bruit [Normal S1, S2] : normal S1, S2 [Clear Lung Fields] : clear lung fields [Good Air Entry] : good air entry [No Respiratory Distress] : no respiratory distress  [Soft] : abdomen soft [Non Tender] : non-tender [No Masses/organomegaly] : no masses/organomegaly [Normal Bowel Sounds] : normal bowel sounds [Normal Gait] : normal gait [No Edema] : no edema [No Cyanosis] : no cyanosis [No Clubbing] : no clubbing [No Varicosities] : no varicosities [No Rash] : no rash [No Skin Lesions] : no skin lesions [Moves all extremities] : moves all extremities [No Focal Deficits] : no focal deficits [Normal Speech] : normal speech [Alert and Oriented] : alert and oriented [Normal memory] : normal memory [de-identified] : RUSB IV

## 2023-05-14 NOTE — END OF VISIT
[FreeTextEntry3] : CAD s/p CABG s/p PCI\par \par Comorbidities as above.  Notably, PAD s/p stenting.\par \par Exertional dyspnea.\par \par ECHO: Inferior hypokinesis / overall preserved LVSF.  Mod to Sev AS.\par \par Plan for cath to further evaluate CAD / AS.

## 2023-05-14 NOTE — HISTORY OF PRESENT ILLNESS
[FreeTextEntry1] : Mr. JEAN PAUL 77 year M arrives  today for evaluation of their Aortic Stenosis. Patient PMH include CAD s/p CABG X 2 at [facility]. Symptoms include SOB, fatigue. NYHA class II. Patient lives home alone no aid. \par \par The STS risk score was calculated and discussed with the patient. All questions and concerns were addressed with the patient. \par \par They're healthcare team includes the following\par PMD: Ana\par Cardio: Yudelka \par \par \par \par The patient is being worked up for TAVR. They were told that the patient will have an M-COT device placed for 30 days for monitoring of any arrhythmias and for the TAVR study. The patient is agreeable.\par

## 2023-05-16 ENCOUNTER — INPATIENT (INPATIENT)
Facility: HOSPITAL | Age: 78
LOS: 0 days | Discharge: HOME | DRG: 287 | End: 2023-05-17
Attending: INTERNAL MEDICINE | Admitting: INTERNAL MEDICINE
Payer: MEDICARE

## 2023-05-16 ENCOUNTER — TRANSCRIPTION ENCOUNTER (OUTPATIENT)
Age: 78
End: 2023-05-16

## 2023-05-16 VITALS
OXYGEN SATURATION: 96 % | HEIGHT: 68 IN | HEART RATE: 80 BPM | SYSTOLIC BLOOD PRESSURE: 102 MMHG | RESPIRATION RATE: 12 BRPM | DIASTOLIC BLOOD PRESSURE: 71 MMHG | WEIGHT: 162.04 LBS

## 2023-05-16 DIAGNOSIS — Z85.038 PERSONAL HISTORY OF OTHER MALIGNANT NEOPLASM OF LARGE INTESTINE: ICD-10-CM
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
## 2023-05-16 DIAGNOSIS — Z79.02 LONG TERM (CURRENT) USE OF ANTITHROMBOTICS/ANTIPLATELETS: ICD-10-CM

## 2023-05-16 DIAGNOSIS — Z95.1 PRESENCE OF AORTOCORONARY BYPASS GRAFT: Chronic | ICD-10-CM

## 2023-05-16 DIAGNOSIS — Z95.9 PRESENCE OF CARDIAC AND VASCULAR IMPLANT AND GRAFT, UNSPECIFIED: Chronic | ICD-10-CM

## 2023-05-16 DIAGNOSIS — Z95.1 PRESENCE OF AORTOCORONARY BYPASS GRAFT: ICD-10-CM

## 2023-05-16 DIAGNOSIS — I10 ESSENTIAL (PRIMARY) HYPERTENSION: ICD-10-CM

## 2023-05-16 DIAGNOSIS — I25.2 OLD MYOCARDIAL INFARCTION: ICD-10-CM

## 2023-05-16 DIAGNOSIS — T82.855A STENOSIS OF CORONARY ARTERY STENT, INITIAL ENCOUNTER: ICD-10-CM
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
## 2023-05-16 DIAGNOSIS — I73.9 PERIPHERAL VASCULAR DISEASE, UNSPECIFIED: ICD-10-CM

## 2023-05-16 DIAGNOSIS — Z90.49 ACQUIRED ABSENCE OF OTHER SPECIFIED PARTS OF DIGESTIVE TRACT: Chronic | ICD-10-CM

## 2023-05-16 DIAGNOSIS — Z90.49 ACQUIRED ABSENCE OF OTHER SPECIFIED PARTS OF DIGESTIVE TRACT: ICD-10-CM

## 2023-05-16 DIAGNOSIS — F17.210 NICOTINE DEPENDENCE, CIGARETTES, UNCOMPLICATED: ICD-10-CM

## 2023-05-16 DIAGNOSIS — K80.20 CALCULUS OF GALLBLADDER WITHOUT CHOLECYSTITIS WITHOUT OBSTRUCTION: ICD-10-CM

## 2023-05-16 DIAGNOSIS — I35.0 NONRHEUMATIC AORTIC (VALVE) STENOSIS: ICD-10-CM

## 2023-05-16 DIAGNOSIS — Z79.82 LONG TERM (CURRENT) USE OF ASPIRIN: ICD-10-CM

## 2023-05-16 DIAGNOSIS — Z98.62 PERIPHERAL VASCULAR ANGIOPLASTY STATUS: Chronic | ICD-10-CM

## 2023-05-16 DIAGNOSIS — Z98.52 VASECTOMY STATUS: ICD-10-CM

## 2023-05-16 DIAGNOSIS — I25.118 ATHEROSCLEROTIC HEART DISEASE OF NATIVE CORONARY ARTERY WITH OTHER FORMS OF ANGINA PECTORIS: ICD-10-CM

## 2023-05-16 LAB
ANION GAP SERPL CALC-SCNC: 12 MMOL/L — SIGNIFICANT CHANGE UP (ref 7–14)
BUN SERPL-MCNC: 13 MG/DL — SIGNIFICANT CHANGE UP (ref 10–20)
CALCIUM SERPL-MCNC: 9.2 MG/DL — SIGNIFICANT CHANGE UP (ref 8.4–10.5)
CHLORIDE SERPL-SCNC: 104 MMOL/L — SIGNIFICANT CHANGE UP (ref 98–110)
CO2 SERPL-SCNC: 22 MMOL/L — SIGNIFICANT CHANGE UP (ref 17–32)
CREAT SERPL-MCNC: 0.9 MG/DL — SIGNIFICANT CHANGE UP (ref 0.7–1.5)
EGFR: 89 ML/MIN/1.73M2 — SIGNIFICANT CHANGE UP
GLUCOSE SERPL-MCNC: 100 MG/DL — HIGH (ref 70–99)
HCT VFR BLD CALC: 42.8 % — SIGNIFICANT CHANGE UP (ref 42–52)
HGB BLD-MCNC: 14.7 G/DL — SIGNIFICANT CHANGE UP (ref 14–18)
MCHC RBC-ENTMCNC: 31 PG — SIGNIFICANT CHANGE UP (ref 27–31)
MCHC RBC-ENTMCNC: 34.3 G/DL — SIGNIFICANT CHANGE UP (ref 32–37)
MCV RBC AUTO: 90.3 FL — SIGNIFICANT CHANGE UP (ref 80–94)
NRBC # BLD: 0 /100 WBCS — SIGNIFICANT CHANGE UP (ref 0–0)
PLATELET # BLD AUTO: 148 K/UL — SIGNIFICANT CHANGE UP (ref 130–400)
PMV BLD: 10.5 FL — HIGH (ref 7.4–10.4)
POTASSIUM SERPL-MCNC: 4.5 MMOL/L — SIGNIFICANT CHANGE UP (ref 3.5–5)
POTASSIUM SERPL-SCNC: 4.5 MMOL/L — SIGNIFICANT CHANGE UP (ref 3.5–5)
RBC # BLD: 4.74 M/UL — SIGNIFICANT CHANGE UP (ref 4.7–6.1)
RBC # FLD: 13.5 % — SIGNIFICANT CHANGE UP (ref 11.5–14.5)
SODIUM SERPL-SCNC: 138 MMOL/L — SIGNIFICANT CHANGE UP (ref 135–146)
WBC # BLD: 8.89 K/UL — SIGNIFICANT CHANGE UP (ref 4.8–10.8)
WBC # FLD AUTO: 8.89 K/UL — SIGNIFICANT CHANGE UP (ref 4.8–10.8)

## 2023-05-16 PROCEDURE — 83735 ASSAY OF MAGNESIUM: CPT

## 2023-05-16 PROCEDURE — 93005 ELECTROCARDIOGRAM TRACING: CPT

## 2023-05-16 PROCEDURE — 80048 BASIC METABOLIC PNL TOTAL CA: CPT

## 2023-05-16 PROCEDURE — 36415 COLL VENOUS BLD VENIPUNCTURE: CPT

## 2023-05-16 PROCEDURE — 93461 R&L HRT ART/VENTRICLE ANGIO: CPT

## 2023-05-16 PROCEDURE — 82962 GLUCOSE BLOOD TEST: CPT

## 2023-05-16 PROCEDURE — 85027 COMPLETE CBC AUTOMATED: CPT

## 2023-05-16 RX ORDER — CLOPIDOGREL BISULFATE 75 MG/1
75 TABLET, FILM COATED ORAL DAILY
Refills: 0 | Status: DISCONTINUED | OUTPATIENT
Start: 2023-05-17 | End: 2023-05-17

## 2023-05-16 RX ORDER — LISINOPRIL 2.5 MG/1
40 TABLET ORAL DAILY
Refills: 0 | Status: DISCONTINUED | OUTPATIENT
Start: 2023-05-16 | End: 2023-05-17

## 2023-05-16 RX ORDER — SODIUM CHLORIDE 9 MG/ML
1000 INJECTION INTRAMUSCULAR; INTRAVENOUS; SUBCUTANEOUS
Refills: 0 | Status: DISCONTINUED | OUTPATIENT
Start: 2023-05-16 | End: 2023-05-17

## 2023-05-16 RX ORDER — ASPIRIN/CALCIUM CARB/MAGNESIUM 324 MG
81 TABLET ORAL DAILY
Refills: 0 | Status: DISCONTINUED | OUTPATIENT
Start: 2023-05-16 | End: 2023-05-17

## 2023-05-16 RX ORDER — ATORVASTATIN CALCIUM 80 MG/1
80 TABLET, FILM COATED ORAL AT BEDTIME
Refills: 0 | Status: DISCONTINUED | OUTPATIENT
Start: 2023-05-16 | End: 2023-05-17

## 2023-05-16 RX ORDER — METOPROLOL TARTRATE 50 MG
25 TABLET ORAL DAILY
Refills: 0 | Status: DISCONTINUED | OUTPATIENT
Start: 2023-05-16 | End: 2023-05-17

## 2023-05-16 NOTE — PATIENT PROFILE ADULT - FALL HARM RISK - HARM RISK INTERVENTIONS

## 2023-05-16 NOTE — ASU PATIENT PROFILE, ADULT - FALL HARM RISK - UNIVERSAL INTERVENTIONS
Bed in lowest position, wheels locked, appropriate side rails in place/Call bell, personal items and telephone in reach/Instruct patient to call for assistance before getting out of bed or chair/Non-slip footwear when patient is out of bed/[place] to call system/Physically safe environment - no spills, clutter or unnecessary equipment/Purposeful Proactive Rounding/Room/bathroom lighting operational, light cord in reach

## 2023-05-16 NOTE — ASU PATIENT PROFILE, ADULT - NSICDXPASTMEDICALHX_GEN_ALL_CORE_FT
PAST MEDICAL HISTORY:  Aortic stenosis     Broken ribs 6 fall from ladder 2019/ 1 from fall in 2021    CAD (coronary artery disease)     Hypertension     Myocardial infarction

## 2023-05-16 NOTE — ASU PATIENT PROFILE, ADULT - HAVE YOU RECEIVED AT LEAST TWO PFIZER AND/OR MODERNA VACCINATIONS (IN ANY COMBINATION) AND/OR ONE JOHNSON & JOHNSON VACCINATION?
Patient was signed out from Dr. Reynolds.  No events during my shift.  Patient had no complaints on assessment.  Vital signs normal.  Will sign out to the overnight doctor will continue monitoring until admission.    MD Mode Santoro, Ludwin Shin MD  01/18/21 0040     Yes

## 2023-05-16 NOTE — H&P CARDIOLOGY - HISTORY OF PRESENT ILLNESS
78 y/old M here for [facility]  Plan for Cardiac Cath to evaluate Stent and Bypass and cross the valve for gradients    History of Present Illness  Mr. JEAN PAUL 77 year M arrives today for evaluation of their Aortic Stenosis. Patient PMH include CAD s/p CABG X 2 at [facility]. Symptoms include SOB, fatigue. NYHA class II. Patient lives home alone no aid.     They're healthcare team includes the following  PMD: Ana  Cardio: Yudelka     The patient is being worked up for TAVR. They were told that the patient will have an M-COT device placed for 30 days for monitoring of any arrhythmias and for the TAVR study. The patient is agreeable.    Aortic stenosis (424.1) (I35.0)  Atherosclerotic heart disease of native coronary artery without angina pectoris (414.01)  (I25.10)  Essential (primary) hypertension (401.9) (I10)  Gallstones (574.20) (K80.20)  History of vascular surgery (V45.89) (Z98.890)  Lumbar transverse process fracture (805.4) (S32.009A)  Malignant neoplasm of ascending colon (153.6) (C18.2)  Multiple rib fractures (807.09) (S22.49XA)  Presence of aortocoronary bypass graft (V45.81) (Z95.1)  Status post laparoscopic cholecystectomy (V45.89) (Z90.49)  Aortic stenosis (424.1) (I35.0)  Carotid artery disease, unspecified laterality (447.9) (I77.9)          Past Medical History  History of Gallstones (574.20) (K80.20)  History of colonic polyps (V12.72) (Z86.010)  History of heart attack (412) (I25.2)  History of hemorrhoids (V13.89) (Z87.19)  History of hypertension (V12.59) (Z86.79)  History of abdominal pain (V13.89) (Z87.898)  History of chest pain (V13.89) (Z87.898)  History of heart murmur in childhood (V12.59) (Z86.79)  History of high cholesterol (V12.29) (Z86.39)  History of indigestion (V12.79) (Z87.19)  History of urinary frequency (V13.09) (Z87.898)  History of Nonrheumatic mitral (valve) insufficiency (424.0) (I34.0)  History of STEMI involving right coronary artery (410.31) (I21.11)    Surgical History  History of Vasectomy  History of CABG  History of Cholecystectomy  History of Heart surgery    Family History  Family history of  : Mother, Father, Brother  Family history of cardiac disorder (V17.49) (Z82.49) : Mother, Father, Brother    Social History  Caffeine use (V49.89) (Z78.9)  Current smoker (305.1) (F17.200)    Alcohol use (V49.89) (Z78.9)  Current every day smoker (305.1) (F17.200)  No illicit drug use    Current Meds   · Aspirin 81 MG TABS; TAKE 1 TABLET DAILY   · Clopidogrel Bisulfate 75 MG Oral Tablet; Take 1 tablet daily   · Metoprolol Succinate ER 25 MG Oral Tablet Extended Release 24 Hour; TAKE 1 TABLET  BY MOUTH EVERY DAY   · Ramipril 10 MG Oral Capsule; TAKE 1 CAPSULE Daily   · Rosuvastatin Calcium 40 MG Oral Tablet; Take 1 tablet daily    Allergies  NKDA    Height 5 ft 8 in   Weight 162 lb    Aortic stenosis (424.1) (I35.0)  CAD (coronary artery disease) (414.00) (I25.10)    Mr. JEAN PAUL 77 year M arrives today for evaluation of their Aortic Stenosis. Patient PMH include CAD s/p CABG X 2 at [facility], CAD s/p Stent 2019, and PAD s/p Stent 2018. Symptoms include SOB, fatigue. NYHA class II. Patient lives home alone no aid. Patient saw his cardiologist and his most current echo shows worsening of the aortic valve. He arrives today for further evaluation.     Plan:  #CAD/ Aortic Stenosis  Plan for Cardiac Cath to evaluate Stent and Bypass and cross the valve for gradients    #HTN  Continue management by general cardiology  Continue current antihypertensives.    #polyp  Colonoscopy done      End of Encounter Meds   · Aspirin 81 MG TABS; TAKE 1 TABLET DAILY   · Clopidogrel Bisulfate 75 MG Oral Tablet; Take 1 tablet daily   · Metoprolol Succinate ER 25 MG Oral Tablet Extended Release 24 Hour; TAKE 1 TABLET  BY MOUTH EVERY DAY   · Ramipril 10 MG Oral Capsule; TAKE 1 CAPSULE Daily   · Rosuvastatin Calcium 40 MG Oral Tablet; Take 1 tablet daily    CAD s/p CABG s/p PCI  Comorbidities as above. Notably, PAD s/p stenting.  Exertional dyspnea.  ECHO: Inferior hypokinesis / overall preserved LVSF. Mod to Sev AS.    Pre cath note:    indication:  [ ] STEMI                [ ] NSTEMI                 [ ] Acute coronary syndrome                         [ ]Unstable Angina   [ ] high risk  [ ] intermediate risk  [ ] low risk                         [x] Stable Angina     non-invasive testing:  Stress Test                         Date: 2022                 result: [ ] high risk  [x ] intermediate risk  [ ] low risk    Anti- Anginal medications:                        [ ] not used                       [x ] used                   [ ] not used but strong indication not to use    Ejection Fraction                       [ ] <29            [ ] 30-39%   [ ] 40-49%     [ ]>50%    CHF                       [ ] active (within last 14 days on meds   [ ] Chronic (on meds but no exacerbation)    COPD                        [ ] mild (on chronic bronchodilators)  [ ] moderate (on chronic steroid therapy)      [ ] severe (indication for home O2 or PACO2 >50)    Other risk factors:                         [x ] Previous MI                       [ ] CVA/ stroke                      [ ] carotid stent/ CEA                      [ x] PVD/PAD- (arterial aneurysm, non-palpable pulses, tortuous vessel with inability to insert catheter, infra-renal dissection, renal or subclavian artery stenosis)                      [ ] diabetic                      [x ] previous CABG                      [ ] Renal Failure     Adjusted CathPCI Bleeding Event Risk: 3.4%    RIGHT RADIAL ARTERY EVALUATION:  VALERIE TEST: [ ] Negative          [ x] Positive: left and right wrist      No IVF: due to AS

## 2023-05-16 NOTE — CHART NOTE - NSCHARTNOTEFT_GEN_A_CORE
PRE-OP DIAGNOSIS:    Moderate to severe AS    PROCEDURE:     [x] Coronary Angiogram     [x] LHC     [] LVG     [x] RHC     [] Intervention (see below)         PHYSICIAN:  Dr. Broussard    ASSISTANT:  Dr. Rendon       PROCEDURE DESCRIPTION:     Consent:      [x] Patient     [] Family Member     []  Used        Anesthesia:     [] General     [x] Sedation     [x] Local        Access & Closure:     [x] 6 Fr Femoral Artery Perclose    [x] 7 Fr Femoral Vein     [] Fr Brachial Vein       IV Contrast: 75 mL     FINDINGS:     Coronary Dominance: Right    LM:     LAD: distal segment receiving flow from patent LIMA graft    RCA: mid segment 95% discrete stenosis, distal segment 95% discrete stenosis  RPDA: mild disease    Patent LIMA to LAD and jump graft to D1 patent    Peak to peak gradient across AV ~20    PA saturation 73%  PCW 5  CO 3.87 (2.07) TD  CO 3.75 (2.0) SANTY  PINKY 1.0    ESTIMATED BLOOD LOSS: < 10 mL        CONDITION:     [x] Good     [] Fair     [] Critical        SPECIMEN REMOVED: N/A       POST-OP DIAGNOSIS:      [] Normal Coronary Angiogram     [] Mild Coronary Artery Disease (< 50% stenosis)     [x] known 3 Vessel Coronary Artery Disease    [x] moderate AS       PLAN OF CARE:     [x] Optimize medical therapy    [x] IV hydration gentle PRE-OP DIAGNOSIS:    Moderate to severe AS    PROCEDURE:     [x] Coronary Angiogram     [x] LHC     [] LVG     [x] RHC     [] Intervention (see below)         PHYSICIAN:  Dr. Broussard    ASSISTANT:  Dr. Rendon       PROCEDURE DESCRIPTION:     Consent:      [x] Patient     [] Family Member     []  Used        Anesthesia:     [] General     [x] Sedation     [x] Local        Access & Closure:     [x] 6 Fr Femoral Artery Perclose    [x] 7 Fr Femoral Vein     [] Fr Brachial Vein       IV Contrast: 75 mL     FINDINGS:     Coronary Dominance: Right    LM:     LAD: distal segment receiving flow from patent LIMA graft    RCA: mid segment 95% discrete stenosis, distal segment 95% discrete stenosis  RPDA: mild disease    Patent LIMA to LAD and jump graft to D1 patent    Peak to peak gradient across AV ~20    PA saturation 73%  PCW 5  CO 3.87 (2.07) TD  CO 3.75 (2.0) SANTY  PINKY 1.0    ESTIMATED BLOOD LOSS: < 10 mL        CONDITION:     [x] Good     [] Fair     [] Critical        SPECIMEN REMOVED: N/A       POST-OP DIAGNOSIS:      [] Normal Coronary Angiogram     [] Mild Coronary Artery Disease (< 50% stenosis)     [x] known 3 Vessel Coronary Artery Disease    [x] moderate AS       PLAN OF CARE:     [x] Optimize medical therapy    [x] IV hydration gentle    SEE CATH REPORT FOR ATTENDING IMPRESSION

## 2023-05-17 ENCOUNTER — TRANSCRIPTION ENCOUNTER (OUTPATIENT)
Age: 78
End: 2023-05-17

## 2023-05-17 VITALS
RESPIRATION RATE: 20 BRPM | DIASTOLIC BLOOD PRESSURE: 70 MMHG | SYSTOLIC BLOOD PRESSURE: 111 MMHG | TEMPERATURE: 97 F | HEART RATE: 64 BPM | OXYGEN SATURATION: 97 %

## 2023-05-17 LAB
ANION GAP SERPL CALC-SCNC: 9 MMOL/L — SIGNIFICANT CHANGE UP (ref 7–14)
BUN SERPL-MCNC: 14 MG/DL — SIGNIFICANT CHANGE UP (ref 10–20)
CALCIUM SERPL-MCNC: 8.6 MG/DL — SIGNIFICANT CHANGE UP (ref 8.4–10.5)
CHLORIDE SERPL-SCNC: 105 MMOL/L — SIGNIFICANT CHANGE UP (ref 98–110)
CO2 SERPL-SCNC: 24 MMOL/L — SIGNIFICANT CHANGE UP (ref 17–32)
CREAT SERPL-MCNC: 0.8 MG/DL — SIGNIFICANT CHANGE UP (ref 0.7–1.5)
EGFR: 92 ML/MIN/1.73M2 — SIGNIFICANT CHANGE UP
GLUCOSE BLDC GLUCOMTR-MCNC: 92 MG/DL — SIGNIFICANT CHANGE UP (ref 70–99)
GLUCOSE SERPL-MCNC: 125 MG/DL — HIGH (ref 70–99)
HCT VFR BLD CALC: 39.5 % — LOW (ref 42–52)
HGB BLD-MCNC: 13.5 G/DL — LOW (ref 14–18)
MAGNESIUM SERPL-MCNC: 1.9 MG/DL — SIGNIFICANT CHANGE UP (ref 1.8–2.4)
MCHC RBC-ENTMCNC: 31.2 PG — HIGH (ref 27–31)
MCHC RBC-ENTMCNC: 34.2 G/DL — SIGNIFICANT CHANGE UP (ref 32–37)
MCV RBC AUTO: 91.2 FL — SIGNIFICANT CHANGE UP (ref 80–94)
NRBC # BLD: 0 /100 WBCS — SIGNIFICANT CHANGE UP (ref 0–0)
PLATELET # BLD AUTO: 123 K/UL — LOW (ref 130–400)
PMV BLD: 10.7 FL — HIGH (ref 7.4–10.4)
POTASSIUM SERPL-MCNC: 4.6 MMOL/L — SIGNIFICANT CHANGE UP (ref 3.5–5)
POTASSIUM SERPL-SCNC: 4.6 MMOL/L — SIGNIFICANT CHANGE UP (ref 3.5–5)
RBC # BLD: 4.33 M/UL — LOW (ref 4.7–6.1)
RBC # FLD: 13.7 % — SIGNIFICANT CHANGE UP (ref 11.5–14.5)
SODIUM SERPL-SCNC: 138 MMOL/L — SIGNIFICANT CHANGE UP (ref 135–146)
WBC # BLD: 7.71 K/UL — SIGNIFICANT CHANGE UP (ref 4.8–10.8)
WBC # FLD AUTO: 7.71 K/UL — SIGNIFICANT CHANGE UP (ref 4.8–10.8)

## 2023-05-17 PROCEDURE — 93010 ELECTROCARDIOGRAM REPORT: CPT

## 2023-05-17 PROCEDURE — 99233 SBSQ HOSP IP/OBS HIGH 50: CPT

## 2023-05-17 RX ORDER — MAGNESIUM SULFATE 500 MG/ML
2 VIAL (ML) INJECTION ONCE
Refills: 0 | Status: COMPLETED | OUTPATIENT
Start: 2023-05-17 | End: 2023-05-17

## 2023-05-17 RX ADMIN — Medication 25 MILLIGRAM(S): at 05:07

## 2023-05-17 RX ADMIN — CLOPIDOGREL BISULFATE 75 MILLIGRAM(S): 75 TABLET, FILM COATED ORAL at 10:45

## 2023-05-17 RX ADMIN — Medication 81 MILLIGRAM(S): at 10:45

## 2023-05-17 RX ADMIN — Medication 25 GRAM(S): at 10:44

## 2023-05-17 RX ADMIN — LISINOPRIL 40 MILLIGRAM(S): 2.5 TABLET ORAL at 05:07

## 2023-05-17 NOTE — DISCHARGE NOTE PROVIDER - HOSPITAL COURSE
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 in 2004 and inferior STEMI 2018 at [facility] and PAD s/p Stent 2018. For symptoms of persistemt SOB, fatigue. NYHA class II, he was evaluated outpatient by his cardiologist,  Dr. jha. His most recent echo shows worsening of the aortic valve. Therefore, was referred to structural heart team for further evaluation of aortic stenosis.   On 5/16th, patient presented to [facility] for scheduled Cardiac Cath to evaluate Stent and Bypass and cross the valve for gradients.  R/LHC  revealed moderate AS with plan to  optimize medical management ( other findings below). Post catheterization, he was admitted overnight to cardiology telemetry for gentle IV hydration.    On POD 1 patient remains HD stable with no complaints. Patient remains in SR with no arrythmias noted on tele. EKG performed showed no acute ST changes. Examination of right common femoral artery showed a C/D/I site with no hematoma, erythema or bruit. Distal pulses are 2+ bilaterally. Renal function remains stable post cath.   Patient is being DC home in stable condition and to followup with  and general cardiologist, Dr. Jha.       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 For symptoms of persistent SOB, fatigue, NYHA class II, he was evaluated outpatient by his cardiologist,  Dr. jha. His most recent echo shows worsening of the aortic valve. Therefore, was referred to structural heart team for further evaluation of aortic stenosis.     On 5/16th, patient presented to [facility] for scheduled Cardiac Cath to evaluate Stent and Bypass and cross the valve for gradients.  R/LHC  revealed moderate AS with plan to  optimize medical management ( other findings below). Post catheterization, he was admitted overnight to cardiology telemetry for gentle IV hydration.    On POD 1 patient remains HD stable with no complaints. Patient remains in SR with no arrythmias noted on tele. EKG performed showed no acute ST changes. Examination of right common femoral artery showed a C/D/I site with no hematoma, erythema or bruit. Distal pulses are 2+ bilaterally. Renal function remains stable post cath.   Patient is being DC home in stable condition and to followup with  and general cardiologist, Dr. Jha.       Right and Left heart catheterization FINDINGS:     Coronary Dominance: Right    LM:   LAD: distal segment receiving flow from patent LIMA graft  RCA: mid segment 95% discrete stenosis, distal segment 95% discrete stenosis  RPDA: mild disease  Patent LIMA to LAD and jump graft to D1 patent    Peak to peak gradient across AV ~20  PA saturation 73%  PCW 5  CO 3.87 (2.07) TD  CO 3.75 (2.0) SANTY  PINKY 1.0

## 2023-05-17 NOTE — DISCHARGE NOTE PROVIDER - NSDCADMDATE_GEN_ALL_CORE_FT
The skin at the access site was anesthetized. Using a micropuncture needle with the Modified Seldinger technique and ultrasound (SCOUPY) the right femoral artery was succesfully accessed in a retrograde fashion over the guidewire, under fluoroscopic guidance using a Introducer Shth 6fr 50cm 11cm Grn Hub Snpft Dil. Ultrasound found the vessel was patent. A permanent recording was saved. 16-May-2023 19:23

## 2023-05-17 NOTE — DISCHARGE NOTE PROVIDER - ATTENDING DISCHARGE PHYSICAL EXAMINATION:
Physical Exam  T(C): 35.9 (05-17-23 @ 08:04), Max: 36.7 (05-16-23 @ 23:42)  HR: 64 (05-17-23 @ 08:04) (63 - 72)  BP: 111/70 (05-17-23 @ 08:04) (111/70 - 115/66)  RR: 20 (05-17-23 @ 08:04) (17 - 20)  SpO2: 97% (05-17-23 @ 08:04) (96% - 97%)  Gen: NAD  CV: RRR, nl S1 and S2, no m/r/g, no LE edema, no JVD  Pulm: CTAB, no crackles  GI: soft, nontender  MSK: normal ROM  Extremities: warm  Neuro: A+Ox3  Psych: cooperative

## 2023-05-17 NOTE — DISCHARGE NOTE PROVIDER - CARE PROVIDER_API CALL
Christo Jha)  Cardiovascular Disease; Internal Medicine  [address] [place] [address]  Phone: [phone]  Fax: [phone]  Follow Up Time: 2 weeks    Jason Broussard)  Cardiovascular Disease; Internal Medicine  [address] [place] [address]  Phone: [phone]  Fax: [phone]  Follow Up Time: 2 weeks

## 2023-05-17 NOTE — DISCHARGE NOTE PROVIDER - NSDCMRMEDTOKEN_GEN_ALL_CORE_FT
aspirin 81 mg oral tablet: 1 tab(s) orally once a day  clopidogrel 75 mg oral tablet: 1 tab(s) orally once a day  Crestor 40 mg oral tablet: 1 tab(s) orally once a day  metoprolol succinate 25 mg oral tablet, extended release: 1 tab(s) orally once a day  ramipril 10 mg oral capsule: 1 cap(s) orally once a day

## 2023-05-17 NOTE — DISCHARGE NOTE PROVIDER - PROVIDER TOKENS
PROVIDER:[TOKEN:[[id number]],FOLLOWUP:[2 weeks]],PROVIDER:[TOKEN:[[id number]],FOLLOWUP:[2 weeks]]

## 2023-05-17 NOTE — DISCHARGE NOTE NURSING/CASE MANAGEMENT/SOCIAL WORK - PATIENT PORTAL LINK FT
You can access the FollowMyHealth Patient Portal offered by [facility] by registering at the following website: [email or web address]. By joining [facility]’s FollowMyHealth portal, you will also be able to view your health information using other applications (apps) compatible with our system.

## 2023-05-17 NOTE — DISCHARGE NOTE PROVIDER - NSDCCPCAREPLAN_GEN_ALL_CORE_FT
PRINCIPAL DISCHARGE DIAGNOSIS  Diagnosis: Moderate aortic stenosis  Assessment and Plan of Treatment:

## 2023-05-17 NOTE — DISCHARGE NOTE PROVIDER - CARE PROVIDERS DIRECT ADDRESSES
,DirectAddress_Unknown,[email or web address]

## 2023-05-17 NOTE — DISCHARGE NOTE NURSING/CASE MANAGEMENT/SOCIAL WORK - NSDCPEFALRISK_GEN_ALL_CORE
For information on Fall & Injury Prevention, visit: [email or web address] OR  [email or web address] OR  https://www.cdc.gov/steadi/patient.html

## 2023-05-17 NOTE — DISCHARGE NOTE PROVIDER - NSDCCPTREATMENT_GEN_ALL_CORE_FT
PRINCIPAL PROCEDURE  Procedure: Cardiac catheterization, right heart  Findings and Treatment: Medications:  - DO NOT stop your dual antiplatelet medication (ie: Plavix/clopidogrel, Aspirin), unless directed by your Cardiologist  - Soreness or tenderness at the site is possible, it will diminish over time. You may take Tylenol every 4-6 hours as needed. Nothing stronger should be required.  Activity:  - Do not drive or operate heavy machinery for 24 hours.  - For groin access, support the groin site with your hand when you  sneeze or cough. No heavy lifting (objects more than 10 pounds) x 1 week.  Hygiene:  - After 24 hours, you may shower and remove the dressing from the site.   - Do not tub bathe for one week.   - Do not rub or apply lotion, cream, powder to the affected site. Leave it open to air.   Diet:   - You may resume your regular diet.   - Drink extra fluid unless othrwise advised.   Special Instructions:  - Bruising or black and blue at the puncture site is possible.  - If there is bleeding from the puncture site (groin or wrist) apply direct, firm pressure on the site and call 911.  - Any sudden swelling, redness, fever, discharge or severe pain, call your physician or call the cath lab.  - If you notice any scab formation in the area avoid touching the site and allow it to heal.  - If Numbness or "pins and needle" sensation occurs in the affected arm, hand, leg; or if the affected site becomes cool to touch or pale that persists for an extended period of time, call your physician immediately to be checked.   - If you developed chest pain, not relieved by your usual routine medication, fainting, lethargy, weakness, report to the nearest emergency room.   - Inform your Dentist or Surgeon if you are taking Aspirin or any antiplatelet medications. Report any bleeding in your urine or stool.   Follow-up:  Please follow up with your Cardiologist in 1-2 weeks after discharge. Please call and make an appointment.         SECONDARY PROCEDURE  Procedure: Left heart cardiac cath  Findings and Treatment:      PRINCIPAL PROCEDURE  Procedure: Cardiac catheterization, right heart  Findings and Treatment: Medications:  - DO NOT stop your dual antiplatelet medication Plavix/clopidogrel, Aspirin, unless directed by your Cardiologist  - Soreness or tenderness at the site is possible, it will diminish over time. You may take Tylenol every 4-6 hours as needed. Nothing stronger should be required.  Activity:  - Do not drive or operate heavy machinery for 24 hours.  - For groin access, support the groin site with your hand when you  sneeze or cough. No heavy lifting (objects more than 10 pounds) x 1 week.  Hygiene:  - After 24 hours, you may shower and remove the dressing from the site.   - Do not tub bathe for one week.   - Do not rub or apply lotion, cream, powder to the affected site. Leave it open to air.   Diet:   - You may resume your regular diet.   - Drink extra fluid unless othrwise advised.   Special Instructions:  - Bruising or black and blue at the puncture site is possible.  - If there is bleeding from the puncture site (groin or wrist) apply direct, firm pressure on the site and call 911.  - Any sudden swelling, redness, fever, discharge or severe pain, call your physician or call the cath lab.  - If you notice any scab formation in the area avoid touching the site and allow it to heal.  - If Numbness or "pins and needle" sensation occurs in the affected arm, hand, leg; or if the affected site becomes cool to touch or pale that persists for an extended period of time, call your physician immediately to be checked.   - If you developed chest pain, not relieved by your usual routine medication, fainting, lethargy, weakness, report to the nearest emergency room.   - Inform your Dentist or Surgeon if you are taking Aspirin or any antiplatelet medications. Report any bleeding in your urine or stool.   Follow-up:  Please follow up with your Cardiologist in 1-2 weeks after discharge. Please call and make an appointment.         SECONDARY PROCEDURE  Procedure: Left heart cardiac cath  Findings and Treatment:

## 2023-05-18 PROBLEM — I35.0 NONRHEUMATIC AORTIC (VALVE) STENOSIS: Chronic | Status: ACTIVE | Noted: 2023-05-16

## 2023-06-01 ENCOUNTER — APPOINTMENT (OUTPATIENT)
Dept: CARDIOLOGY | Facility: CLINIC | Age: 78
End: 2023-06-01
Payer: MEDICARE

## 2023-06-01 VITALS
SYSTOLIC BLOOD PRESSURE: 95 MMHG | DIASTOLIC BLOOD PRESSURE: 67 MMHG | BODY MASS INDEX: 24.55 KG/M2 | RESPIRATION RATE: 12 BRPM | HEIGHT: 68 IN | OXYGEN SATURATION: 96 % | HEART RATE: 92 BPM | TEMPERATURE: 98 F | WEIGHT: 162 LBS

## 2023-06-01 PROCEDURE — 99214 OFFICE O/P EST MOD 30 MIN: CPT

## 2023-06-03 NOTE — HISTORY OF PRESENT ILLNESS
[FreeTextEntry1] : Mr. JEAN PAUL 77 year M arrives today for evaluation of their Aortic Stenosis. Patient PMH include CAD s/p CABG X 2 at [facility] ([facility]), CAD s/p Stent 2019, and PAD s/p Stent 2018. Since then he has had multiple coronary angiograms and apparently had additional stenting in 2013.  Symptoms include SOB, fatigue. NYHA class II. Patient lives home alone no aid. Patient saw his cardiologist and his most current echo shows worsening of the aortic valve. He arrives today for further evaluation. \par \par He recently underwent a colonoscopy and was told that he had a cancerous polyp and he is being followed up by the GI physicians for the same.\par Pt lives home with wife no aide\par Denies family history of CAD\par \par They're healthcare team includes the following\par PMD: Dimasso\par Cardio: Nevelev \par \par 5 Meter walk                                                 \par 1.  4S                                                                          Frailty:   \par 2.  3.5 S                                                                     Right: 16.8 17.2 15.4 \par 3.  4S                                                                         Left: 16.2 14.3 16.5\par

## 2023-06-03 NOTE — PHYSICAL EXAM
[Well Developed] : well developed [Well Nourished] : well nourished [No Acute Distress] : no acute distress [Normal Conjunctiva] : normal conjunctiva [Normal Venous Pressure] : normal venous pressure [No Carotid Bruit] : no carotid bruit [Normal S1, S2] : normal S1, S2 [Clear Lung Fields] : clear lung fields [Good Air Entry] : good air entry [No Respiratory Distress] : no respiratory distress  [Soft] : abdomen soft [Non Tender] : non-tender [No Masses/organomegaly] : no masses/organomegaly [Normal Bowel Sounds] : normal bowel sounds [Normal Gait] : normal gait [No Edema] : no edema [No Cyanosis] : no cyanosis [No Clubbing] : no clubbing [No Varicosities] : no varicosities [No Rash] : no rash [No Skin Lesions] : no skin lesions [Moves all extremities] : moves all extremities [No Focal Deficits] : no focal deficits [Normal Speech] : normal speech [Alert and Oriented] : alert and oriented [Normal memory] : normal memory [de-identified] : RUSB IV

## 2023-06-03 NOTE — END OF VISIT
[FreeTextEntry3] : \par Clinically stable.\par \par CATH:\par Occluded LM, patent LIMA-LAD / DG Sequential, unrevascularized CX, and sequential focal RCA stenosis.\par Mod to Sev PLFLG AS.\par \par Plan for CTA.  If anatomy favorable for TAVR, plan for PCI RCA followed by TAVR.\par Cont current cardiac Rx.

## 2023-06-03 NOTE — PHYSICAL EXAM
[Well Developed] : well developed [Well Nourished] : well nourished [No Acute Distress] : no acute distress [Normal Conjunctiva] : normal conjunctiva [Normal Venous Pressure] : normal venous pressure [No Carotid Bruit] : no carotid bruit [Normal S1, S2] : normal S1, S2 [Clear Lung Fields] : clear lung fields [Good Air Entry] : good air entry [No Respiratory Distress] : no respiratory distress  [Soft] : abdomen soft [Non Tender] : non-tender [No Masses/organomegaly] : no masses/organomegaly [Normal Bowel Sounds] : normal bowel sounds [Normal Gait] : normal gait [No Edema] : no edema [No Cyanosis] : no cyanosis [No Clubbing] : no clubbing [No Varicosities] : no varicosities [No Rash] : no rash [No Skin Lesions] : no skin lesions [Moves all extremities] : moves all extremities [No Focal Deficits] : no focal deficits [Normal Speech] : normal speech [Alert and Oriented] : alert and oriented [Normal memory] : normal memory [de-identified] : RUSB IV

## 2023-06-03 NOTE — ASSESSMENT
[FreeTextEntry1] : Mr. JEAN PAUL 77 year M arrives today for evaluation of their Aortic Stenosis. Patient PMH include CAD s/p CABG X 2 at [facility] ([facility]), CAD s/p Stent 2019, and PAD s/p Stent 2018.  Here for review of Cath. \par \par Plan:\par Reviewed Cardiac Catheterization with patient and wife\par Has coronary disease that would require intervention\par He has a CTA as scheduled 6/22\par Televisit following week for review\par If anatomy not favorable for TAVR will plan for CABG/AVR\par If can get TAVR according to CTA will plan for PCI and repeat Echo in 6 months\par \par Phyllis MEJIA FNP-BC, am acting as scribe for  \par

## 2023-06-03 NOTE — HISTORY OF PRESENT ILLNESS
[FreeTextEntry1] : Mr. JEAN PAUL 77 year M arrives today for evaluation of their Aortic Stenosis. Patient PMH include CAD s/p CABG X 2 at [facility] ([facility]), CAD s/p Stent 2019, and PAD s/p Stent 2018. Since then he has had multiple coronary angiograms and apparently had additional stenting in 2013.  Symptoms include SOB, fatigue. NYHA class II. Patient lives home alone no aid. Patient saw his cardiologist and his most current echo shows worsening of the aortic valve. He arrives today for further evaluation. \par \par He recently underwent a colonoscopy and was told that he had a cancerous polyp and he is being followed up by the GI physicians for the same.\par Pt lives home with wife no aide\par Denies family history of CAD\par \par They're healthcare team includes the following\par PMD: Dimasso\par Cardio: Nevelev \par \par 5 Meter walk                                                 \par 1.  4S                                                                          Frailty:   \par 2.  3.5 S                                                                     Right: 16.8 17.2 15.4 \par 3.  4S                                                                         Left: 16.2 14.3 16.5\par

## 2023-06-13 ENCOUNTER — APPOINTMENT (OUTPATIENT)
Dept: CARDIOLOGY | Facility: CLINIC | Age: 78
End: 2023-06-13
Payer: MEDICARE

## 2023-06-13 VITALS
WEIGHT: 158 LBS | BODY MASS INDEX: 23.95 KG/M2 | SYSTOLIC BLOOD PRESSURE: 110 MMHG | DIASTOLIC BLOOD PRESSURE: 70 MMHG | HEIGHT: 68 IN | HEART RATE: 81 BPM

## 2023-06-13 PROCEDURE — 99214 OFFICE O/P EST MOD 30 MIN: CPT | Mod: 25

## 2023-06-13 PROCEDURE — 93000 ELECTROCARDIOGRAM COMPLETE: CPT

## 2023-06-13 NOTE — PHYSICAL EXAM
[Well Developed] : well developed [Well Nourished] : well nourished [No Acute Distress] : no acute distress [Normal Conjunctiva] : normal conjunctiva [Normal Venous Pressure] : normal venous pressure [No Carotid Bruit] : no carotid bruit [Normal S1, S2] : normal S1, S2 [No Murmur] : no murmur [No Rub] : no rub [No Gallop] : no gallop [Good Air Entry] : good air entry [No Respiratory Distress] : no respiratory distress  [Soft] : abdomen soft [Non Tender] : non-tender [No Masses/organomegaly] : no masses/organomegaly [Normal Bowel Sounds] : normal bowel sounds [Normal Gait] : normal gait [No Edema] : no edema [No Cyanosis] : no cyanosis [No Clubbing] : no clubbing [No Varicosities] : no varicosities [No Rash] : no rash [No Skin Lesions] : no skin lesions [Moves all extremities] : moves all extremities [No Focal Deficits] : no focal deficits [Normal Speech] : normal speech [Alert and Oriented] : alert and oriented [Normal memory] : normal memory [de-identified] : Faint crackles at the right base

## 2023-06-13 NOTE — HISTORY OF PRESENT ILLNESS
[FreeTextEntry1] : Mr. Coulter is a 78-year-old man with history of CAD s/p CABG x2 in [facility] 2004 and PCI for inferior STEMI 2018, and moderate-to-severe aortic stenosis presenting for follow up.\par \par Patient previously followed with Dr. Bedolla and was last seen by him in clinic on 4/27/22.\par \par On our first visit he noted he was becoming more fatigued/short of breath when he goes for a walk. TTE demonstrated stable moderate-severe AS. I referred him to the structural team and he has since undergone LHC demonstrating multivessel CAD as below. He is planned for CTA TAVR protocol with consideration for AVR/CABG vs PCI/TAVR.\par \par C 5/2023\par - LM and RCA disease\par - Patent LIMA sequential to LAD/Diag\par - mRCA focal instent restenosis and distal RCA stenosis\par - LCx not visualized (LM occlusion) and not revascularizaed\par - Normal RH pressures; low cardiac output and LV SV, moderate-severe AS (paradoxical LFLG).\par - Rec TAVR evaluation and PCI of RCA\par \par AAA 2023\par - Infrarenal mild dilatation, 2.6x2.9cm aneurysm\par \par Carotid Doppler 9/2022\par - Bilateral <50% stenosis with protuberant plaque.\par \par TTE 4/2023 - EF 56%, unchanged RWMAs, mod-severe AS (Vmax 3.05m/s, mean PG 20.1mmHg, DI 0.25, PINKY 0.83), mild AI\par \par TTE 4/2022 - EF 50%, PB hypokinesis, moderate MR, mild AR, moderate AS (Vmax 3.0 m/s, mean PG 15 mmHg)\par \par Nuclear Stress Lexiscan\par - Negative, no changes vs 2019\par \par LHC 2018 at [facility]\par - 100% LM, LAD, LCx\par - Patent LIMA-diag/LAD\par - RCA occluded in the mid-portion, s/p PCI with MARISOL\par - Distal second PL is occluded, likely thrombus.\par \par AAA US\par - Infrarenal aorta with mild dilatation (2.6 x 2.9cm)\par \par Labs:\par - , TG 60, HDL 36, LDL 61, non-HDL 73\par - Cr 1.0, K 4.2

## 2023-06-13 NOTE — ASSESSMENT
[FreeTextEntry1] : Mr. Coulter is a 77-year-old man with history of CAD s/p CABG x2 in [place] 2004 and PCI for inferior STEMI 2018 presenting for follow up in management of CAD.\par \par Impression:\par (1) CAD s/p CABG at [facility] 2004 and inferior STEMI 2018 at [facility], stable, no angina, LDL <70\par (2) Aortic stenosis, moderate to severe, progressing over the past year with PINKY in the severe range (Vmax 3.0, mPG 20, AV 0.83)\par (3) HTN\par (4) HLD\par (5) Mild AAA, infrarenal measuring 2.6x2.9cm\par (6) Carotid artery disease with protuberant plaque\par \par Plan:\par - Discussed his diagnosis of moderate to severe AS in detail. Pending CT TAVR protocol with consideration for PCI -> TAVR vs re-op CABG/AVR.\par - TTE q6 months\par - Annual carotids\par - Continue DAPT for now given multiple events and unrevascularized CAD\par - Continue metoprolol as an antianginal\par - Continue high intensity statin\par - Continue ramipril for HTN.\par \par RTC after seeing the structural team

## 2023-06-22 ENCOUNTER — OUTPATIENT (OUTPATIENT)
Dept: OUTPATIENT SERVICES | Facility: HOSPITAL | Age: 78
LOS: 1 days | End: 2023-06-22
Payer: MEDICARE

## 2023-06-22 DIAGNOSIS — Z95.9 PRESENCE OF CARDIAC AND VASCULAR IMPLANT AND GRAFT, UNSPECIFIED: Chronic | ICD-10-CM

## 2023-06-22 DIAGNOSIS — Z95.1 PRESENCE OF AORTOCORONARY BYPASS GRAFT: Chronic | ICD-10-CM

## 2023-06-22 DIAGNOSIS — Z98.62 PERIPHERAL VASCULAR ANGIOPLASTY STATUS: Chronic | ICD-10-CM

## 2023-06-22 DIAGNOSIS — Z90.49 ACQUIRED ABSENCE OF OTHER SPECIFIED PARTS OF DIGESTIVE TRACT: Chronic | ICD-10-CM

## 2023-06-22 DIAGNOSIS — I35.0 NONRHEUMATIC AORTIC (VALVE) STENOSIS: ICD-10-CM

## 2023-06-22 PROCEDURE — 74174 CTA ABD&PLVS W/CONTRAST: CPT | Mod: 26

## 2023-06-22 PROCEDURE — 75574 CT ANGIO HRT W/3D IMAGE: CPT | Mod: 26

## 2023-06-22 PROCEDURE — 75574 CT ANGIO HRT W/3D IMAGE: CPT

## 2023-06-22 PROCEDURE — 74174 CTA ABD&PLVS W/CONTRAST: CPT

## 2023-06-23 DIAGNOSIS — I35.0 NONRHEUMATIC AORTIC (VALVE) STENOSIS: ICD-10-CM

## 2023-06-29 ENCOUNTER — APPOINTMENT (OUTPATIENT)
Dept: CARDIOLOGY | Facility: CLINIC | Age: 78
End: 2023-06-29
Payer: MEDICARE

## 2023-06-29 PROCEDURE — 99442: CPT

## 2023-07-02 NOTE — ASSESSMENT
[FreeTextEntry1] : Mr. JEAN PAUL 77 year M arrives today for evaluation of their Aortic Stenosis. Patient PMH include CAD s/p CABG X 2 at [facility] ([facility]), CAD s/p Stent 2019, and PAD s/p Stent 2018. Here for review of Cath. \par \par \par Plan:\par #SOB\par Will need PCI of the RCA\par \par #Aortic Stenosis\par Will need repeat echo in 3-6 months after stenting.

## 2023-07-02 NOTE — END OF VISIT
[FreeTextEntry3] : \par TeleVisit as above.\par Clinically stable.\par Cath demonstrated LM occlusion, patient MARTINEZ-LAD / DG, RCA ISR and distal stenosis, CX not revascularized.\par Mod to Sev AS (PLFLG)\par Plan for PCI RCA.\par Stagged TAVR.\par \par Plan discussed.\par 15-minute encounter.

## 2023-07-02 NOTE — REASON FOR VISIT
[Symptom and Test Evaluation] : symptom and test evaluation [Structural Heart and Valve Disease] : structural heart and valve disease [Home] : at home, [unfilled] , at the time of the visit. [Medical Office: ([facility])___] : at the medical office located in  [FreeTextEntry1] : Mr. JEAN PAUL 77 year M is called tody to discuss future planning of their Aortic Stenosis. Patient PMH include CAD s/p CABG X 2 at [facility] ([facility]), CAD s/p Stent 2019, and PAD s/p Stent 2018. Since then he has had multiple coronary angiograms and apparently had additional stenting in 2013. Symptoms include SOB, fatigue. NYHA class II. Patient lives home alone no aid. Patient saw his cardiologist and his most current echo shows worsening of the aortic valve. He arrives today for further evaluation. \par \par He recently underwent a colonoscopy and was told that he had a cancerous polyp and he is being followed up by the GI physicians for the same.\par Pt lives home with wife no aide\par Denies family history of CAD\par \par They're healthcare team includes the following\par PMD: Ana\par Cardio: Yudelka \par

## 2023-07-11 LAB
ALBUMIN SERPL ELPH-MCNC: 4.3 G/DL
ALP BLD-CCNC: 83 U/L
ALT SERPL-CCNC: 20 U/L
ANION GAP SERPL CALC-SCNC: 15 MMOL/L
AST SERPL-CCNC: 25 U/L
BILIRUB SERPL-MCNC: 0.9 MG/DL
BUN SERPL-MCNC: 19 MG/DL
CALCIUM SERPL-MCNC: 9.5 MG/DL
CHLORIDE SERPL-SCNC: 103 MMOL/L
CO2 SERPL-SCNC: 20 MMOL/L
CREAT SERPL-MCNC: 1.1 MG/DL
EGFR: 70 ML/MIN/1.73M2
GLUCOSE SERPL-MCNC: 108 MG/DL
INR PPP: 0.97 RATIO
POTASSIUM SERPL-SCNC: 4.5 MMOL/L
PROT SERPL-MCNC: 7.4 G/DL
PT BLD: 11 SEC
SODIUM SERPL-SCNC: 138 MMOL/L

## 2023-07-31 VITALS
HEIGHT: 68 IN | RESPIRATION RATE: 12 BRPM | HEART RATE: 76 BPM | OXYGEN SATURATION: 98 % | SYSTOLIC BLOOD PRESSURE: 136 MMHG | DIASTOLIC BLOOD PRESSURE: 79 MMHG | WEIGHT: 162.04 LBS

## 2023-07-31 NOTE — H&P CARDIOLOGY - HISTORY OF PRESENT ILLNESS
77 y/o male with PMHx: HTN, HLD, PAD s/p stent 2018, CAD h/o CABG 2004 (LIMA- DIAG/LAD) / inferior STEMI 2018, AS, + smoker presented to cardiologists office with c/o continued SOB and fatigue. Recent LHC on 5/2023 significant for LM and RCA disease. ECHO 4/26: EF 56% , Moderate-to-severe aortic stenosis (Vmax 3.05m/s, mean PG 20.1mmHg, DI 0.25, PINKY 0.83). The valve appears heavily calcified and severely stenotic on short axis imaging. Patient presents today for LHC with possible intervention.     LHC 5/2023  - LM and RCA disease  - Patent LIMA sequential to LAD/Diag  - mRCA focal instent restenosis and distal RCA stenosis  - LCx not visualized (LM occlusion) and not revascularizaed  - Normal RH pressures; low cardiac output and LV SV, moderate-severe AS (paradoxical LFLG).    Pre cath note:    indication:  [ ] STEMI                [ ] NSTEMI                 [ ] Acute coronary syndrome                     [ ]Unstable Angina   [ ] high risk  [ ] intermediate risk  [ ] low risk                     [ ] Stable Angina     non-invasive testing:                          Date:                     result: [ ] high risk  [ ] intermediate risk  [ ] low risk    Anti- Anginal medications:                    [ ] not used                       [ ] used :      [ ]CCB  [x ] BB  [ ] Nitrate [ ] Ranexa                [ ] not used but strong indication not to use    Ejection Fraction                   [ ] <29            [ ] 30-39%   [ ] 40-49%     [x ]>50%    CHF                   [ ] active (within last 14 days on meds   [ ] Chronic (on meds but no exacerbation)    COPD                   [ ] mild (on chronic bronchodilators)  [ ] moderate (on chronic steroid therapy)      [ ] severe (indication for home O2 or PACO2 >50)    Other risk factors:                       [ x] Previous MI                     [ ] CVA/ stroke                    [ ] carotid stent/ CEA                    [x ] PVD/PAD- (arterial aneurysm, non-palpable pulses, tortuous vessel with inability to insert catheter, infra-renal dissection, renal or subclavian artery stenosis)                    [ ] diabetic                    [ x] previous CABG                    [ ] Renal Failure             Right Maurice Test:    Adjusted Cath Bleeding Risk: 5.4%    Pre-hydration:    EF, 4/23: 56%     EKG, date:   - Rec TAVR evaluation and PCI of RCA 79 y/o male with PMHx: HTN, HLD, PAD s/p stent 2018, CAD h/o CABG 2004 (LIMA- DIAG/LAD) / inferior STEMI 2018, AS, + smoker presented to cardiologists office with c/o continued SOB and fatigue. Recent LHC on 5/2023 significant for LM and RCA disease. ECHO 4/26: EF 56% , Moderate-to-severe aortic stenosis (Vmax 3.05m/s, mean PG 20.1mmHg, DI 0.25, PINKY 0.83). The valve appears heavily calcified and severely stenotic on short axis imaging. Patient presents today for LHC with possible intervention.     LHC 5/2023  - LM and RCA disease  - Patent LIMA sequential to LAD/Diag  - mRCA focal instent restenosis and distal RCA stenosis  - LCx not visualized (LM occlusion) and not revascularizaed  - Normal RH pressures; low cardiac output and LV SV, moderate-severe AS (paradoxical LFLG).    Pre cath note:    indication:  [ ] STEMI                [ ] NSTEMI                 [ ] Acute coronary syndrome                     [ ]Unstable Angina   [ ] high risk  [ ] intermediate risk  [ ] low risk                     [ ] Stable Angina     non-invasive testing:                          Date:                     result: [ ] high risk  [ ] intermediate risk  [ ] low risk    Anti- Anginal medications:                    [ ] not used                       [ ] used :      [ ]CCB  [x ] BB  [ ] Nitrate [ ] Ranexa                [ ] not used but strong indication not to use    Ejection Fraction                   [ ] <29            [ ] 30-39%   [ ] 40-49%     [x ]>50%    CHF                   [ ] active (within last 14 days on meds   [ ] Chronic (on meds but no exacerbation)    COPD                   [ ] mild (on chronic bronchodilators)  [ ] moderate (on chronic steroid therapy)      [ ] severe (indication for home O2 or PACO2 >50)    Other risk factors:                       [ x] Previous MI                     [ ] CVA/ stroke                    [ ] carotid stent/ CEA                    [x ] PVD/PAD- (arterial aneurysm, non-palpable pulses, tortuous vessel with inability to insert catheter, infra-renal dissection, renal or subclavian artery stenosis)                    [ ] diabetic                    [ x] previous CABG                    [ ] Renal Failure             Right Maurice Test:    Adjusted Cath Bleeding Risk: 5.4%    Pre-hydration:    EF, 4/23: 56%     EKG, date:   - Rec TAVR evaluation and PCI of RCA 79 y/o male with PMHx: HTN, HLD, PAD s/p stent 2018, CAD h/o CABG 2004 (LIMA- DIAG/LAD) / inferior STEMI 2018, AS, + smoker presented to cardiologists office with c/o continued SOB and fatigue. Recent LHC on 5/2023 significant for LM and RCA disease. ECHO 4/26: EF 56% , Moderate-to-severe aortic stenosis (Vmax 3.05m/s, mean PG 20.1mmHg, DI 0.25, PINKY 0.83). The valve appears heavily calcified and severely stenotic on short axis imaging. Patient presents today for LHC with possible intervention.     LHC 5/2023  - LM and RCA disease  - Patent LIMA sequential to LAD/Diag  - mRCA focal instent restenosis and distal RCA stenosis  - LCx not visualized (LM occlusion) and not revascularized  - Normal RH pressures; low cardiac output and LV SV, moderate-severe AS (paradoxical LFLG).    Pre cath note:    indication:  [ ] STEMI                [ ] NSTEMI                 [ ] Acute coronary syndrome                     [ ]Unstable Angina   [ ] high risk  [ ] intermediate risk  [ ] low risk                     [ ] Stable Angina     non-invasive testing:                          Date:   06/22/2023                    result: [ ] high risk  [x ] intermediate risk  [ ] low risk    Anti- Anginal medications:                    [ ] not used                       [x] used :      [x ]CCB  [x ] BB  [ ] Nitrate [ ] Ranexa                [ ] not used but strong indication not to use    Ejection Fraction                   [ ] <29            [ ] 30-39%   [ ] 40-49%     [x ]>50%    CHF                   [ ] active (within last 14 days on meds   [ ] Chronic (on meds but no exacerbation)    COPD                   [ ] mild (on chronic bronchodilators)  [ ] moderate (on chronic steroid therapy)      [ ] severe (indication for home O2 or PACO2 >50)    Other risk factors:                       [ x] Previous MI                     [ ] CVA/ stroke                    [ ] carotid stent/ CEA                    [x ] PVD/PAD- (arterial aneurysm, non-palpable pulses, tortuous vessel with inability to insert catheter, infra-renal dissection, renal or subclavian artery stenosis)                    [ ] diabetic                    [ x] previous CABG                    [ ] Renal Failure       Right Maurice Test: Positive    Adjusted Cath Bleeding Risk: 5.4%    Pre-hydration:  cc/hr    EF, 4/23: 56%     EKG, date:   - Rec TAVR evaluation and PCI of RCA    < from: CT Angio Heart Structural w/ IV Cont (06.22.23 @ 11:55) >  ACC: [id number] EXAM:  CT ANGIO HEART STRUCTURAL IC   ORDERED BY: LESLIE CASTILLO     PROCEDURE DATE:  06/22/2023        INTERPRETATION:  CLINICAL INDICATION: Aortic stenosis, evaluate for   transcatheter aortic valve implantation.    TECHNIQUE: CT angiogram of the chest, abdomen and pelvis was performed.    ECG-gated acquisition through the chest and ungated acquisition through   the abdomen and pelvis in the arterial phase of contrast enhancement.   Sagittal and coronal reformats were performed as well as 3D   reconstructions.    CONTRAST:  110 cc of Omnipaque 350    COMPARISON: None.    FINDINGS:    ANNULUS/AORTA:  Annular measurements were performed using images reconstructed during  systole - RR cycle -  35%      VALVULAR CALCIFICATION AND AORTIC ROOT MEASUREMENTS:  The aortic valve is trileaflet.  The aortic valve is severely calcified. Aortic valve calcium score is 2825  Protrusion of aortic valve calcifications into the outflow tract: Minimal  Calcifications of the aorto-mitral continuity: Moderate  Mitral annular calcifications: Severe    Annulus Area = 604 mm2  Annulus Perimeter = 88 mm  Annulus bi-plane diameter of elliptical cross section = 30.7 X 25.3 mm  Sinuses of Valsalva diameter= 35.5 X 38.2 X 36.1 mm (Cusp to commissure)  The left main coronary artery arises 16.5 mm from the aortic annulus.  The right coronary artery arises 18.7 mm from the aortic annulus.  Left sinus of valsalva tyrone = 20.9 mm  Right sinus of valsalva tyrone = 23.1 mm  Non-coronary sinus of valsalva tyrone = 19.5 mm  Left ventricular outflow tract = 33.6 x 23.4 mm  Sinotubular junction:  33.8 x 30.5 mm    Aortic root angulation with respect to axial plane: 55 degrees  Optimal fluoroscopic angles Thai 5 CAU 14    AORTA:  The ascending aorta is mildly calcified.  Mid ascending aorta: 38.4 x 36.7 mm  Mid descending aorta: 29.9 x 27.8 mm  Narrowest abdominal Aorta diameter: 22.8 x 17.3 mm  Mild ectasia noted at the abdominal aorta.    ILIOFEMORAL RUNOFF:    Right-sided tortuosity: moderate  Right-sided calcification: Moderate, moderate to severe distally  Right common iliac minimum diameter: 9.7 x 7.8 mm  Right external iliac minimum diameter: 9.2 x 6.2 mm  Right common femoral diameter:8.5 x 5.9 mm moderate to severe   calcifications at this level. Measurements were performed at the level of   the femoral head.    Left-sided tortuosity: moderate  Left-sided calcification: Moderate to severe  Left commoniliac minimum diameter: 10.3 x11.3 mm  Left external iliac minimum diameter: 8.4 x 5.9 mm  Left common femoral diameter:10 x 7.2 mm with extensive calcification at   several. Measurements were performed at the level of the femoral head.    HEART AND CORONARY ARTERIES: The heart is normal in size. There is no   pericardial effusion. There is extensive atherosclerosis of the major   epicardial coronary arteries and their visualized branches.    Extracardiac finding section dictated separately by radiology at the end   of the final report.      IMPRESSION:    *  Annulus area is 604 mm2, bi-plane diameter of elliptical cross section   30.7 x 25.3 and annulus perimeter is 88 mm.  *  The smallest vessel diameter in the pelvis is 5.9 x 8.5 mm on the  right and 5.9 x 8.4 mm on the left.    Extracardiac Findings Only (Cardiac and aortic findings were not reviewed   by the radiologist)  Comparison: 8/25/2021    NOTE: Partial chest, abdomen and pelvis are out of the field-of-view.    Findings:    CHEST:  Partially imaged lungs demonstrate upper lung predominant emphysematous   changes. No mass or consolidation. No pneumothorax or pleural effusion.  No lymphadenopathy by size criteria.  Cardiac findings dictated separately.    ABDOMEN (arterial phase limited):  Postcholecystectomy. Spleen, liver, and adrenal glands are within normal   limits. Pancreatic calcifications likely reflecting sequela of chronic   pancreatitis.  Symmetric enhancement of the kidneys without hydronephrosis.  Colonic diverticulosis. No bowel obstruction, pneumatosis,   pneumoperitoneum, or ascites.  Prostamegaly.    BONES:  No acute osseous abnormality. Bony degenerative changes.    IMPRESSION:    No acute intrathoracic or abdominopelvic abnormality.    Upper lung predominant emphysema. If patient meets criteria, consider   enrollment into a lung cancer screening program with annual low-dose   chest CT.    Cardiac and aortic findings independently dictated by the Cardiology   Attending as above.    Jose Manuel De Paz [phone] M.D., Attending Cardiologist  Robert Bland M.D., Attending Radiologist     77 y/o male with PMHx: HTN, HLD, PAD s/p stent 2018, CAD h/o CABG 2004 (LIMA- DIAG/LAD) / inferior STEMI 2018, AS, + smoker presented to cardiologists office with c/o continued SOB and fatigue. Recent LHC on 5/2023 significant for LM and RCA disease. ECHO 4/26: EF 56% , Moderate-to-severe aortic stenosis (Vmax 3.05m/s, mean PG 20.1mmHg, DI 0.25, PINKY 0.83). The valve appears heavily calcified and severely stenotic on short axis imaging. Patient presents today for LHC with possible intervention.     LHC 5/2023  - LM and RCA disease  - Patent LIMA sequential to LAD/Diag  - mRCA focal instent restenosis and distal RCA stenosis  - LCx not visualized (LM occlusion) and not revascularized  - Normal RH pressures; low cardiac output and LV SV, moderate-severe AS (paradoxical LFLG).    Pre cath note:    indication:  [ ] STEMI                [ ] NSTEMI                 [ ] Acute coronary syndrome                     [ ]Unstable Angina   [ ] high risk  [ ] intermediate risk  [ ] low risk                     [ ] Stable Angina     non-invasive testing:                          Date:   06/22/2023                    result: [ ] high risk  [x ] intermediate risk  [ ] low risk    Anti- Anginal medications:                    [ ] not used                       [x] used :      [x ]CCB  [x ] BB  [ ] Nitrate [ ] Ranexa                [ ] not used but strong indication not to use    Ejection Fraction                   [ ] <29            [ ] 30-39%   [ ] 40-49%     [x ]>50%    CHF                   [ ] active (within last 14 days on meds   [ ] Chronic (on meds but no exacerbation)    COPD                   [ ] mild (on chronic bronchodilators)  [ ] moderate (on chronic steroid therapy)      [ ] severe (indication for home O2 or PACO2 >50)    Other risk factors:                       [ x] Previous MI                     [ ] CVA/ stroke                    [ ] carotid stent/ CEA                    [x ] PVD/PAD- (arterial aneurysm, non-palpable pulses, tortuous vessel with inability to insert catheter, infra-renal dissection, renal or subclavian artery stenosis)                    [ ] diabetic                    [ x] previous CABG                    [ ] Renal Failure       Right Maurice Test: Positive    Adjusted Cath Bleeding Risk: 5.4%    Pre-hydration:  cc/hr    EF, 4/23: 56%     EKG, date:   - Rec TAVR evaluation and PCI of RCA    < from: CT Angio Heart Structural w/ IV Cont (06.22.23 @ 11:55) >  ACC: [id number] EXAM:  CT ANGIO HEART STRUCTURAL IC   ORDERED BY: LESLIE CASTILLO     PROCEDURE DATE:  06/22/2023        INTERPRETATION:  CLINICAL INDICATION: Aortic stenosis, evaluate for   transcatheter aortic valve implantation.    TECHNIQUE: CT angiogram of the chest, abdomen and pelvis was performed.    ECG-gated acquisition through the chest and ungated acquisition through   the abdomen and pelvis in the arterial phase of contrast enhancement.   Sagittal and coronal reformats were performed as well as 3D   reconstructions.    CONTRAST:  110 cc of Omnipaque 350    COMPARISON: None.    FINDINGS:    ANNULUS/AORTA:  Annular measurements were performed using images reconstructed during  systole - RR cycle -  35%      VALVULAR CALCIFICATION AND AORTIC ROOT MEASUREMENTS:  The aortic valve is trileaflet.  The aortic valve is severely calcified. Aortic valve calcium score is 2825  Protrusion of aortic valve calcifications into the outflow tract: Minimal  Calcifications of the aorto-mitral continuity: Moderate  Mitral annular calcifications: Severe    Annulus Area = 604 mm2  Annulus Perimeter = 88 mm  Annulus bi-plane diameter of elliptical cross section = 30.7 X 25.3 mm  Sinuses of Valsalva diameter= 35.5 X 38.2 X 36.1 mm (Cusp to commissure)  The left main coronary artery arises 16.5 mm from the aortic annulus.  The right coronary artery arises 18.7 mm from the aortic annulus.  Left sinus of valsalva tyrone = 20.9 mm  Right sinus of valsalva tyrone = 23.1 mm  Non-coronary sinus of valsalva tyrone = 19.5 mm  Left ventricular outflow tract = 33.6 x 23.4 mm  Sinotubular junction:  33.8 x 30.5 mm    Aortic root angulation with respect to axial plane: 55 degrees  Optimal fluoroscopic angles Cuban 5 CAU 14    AORTA:  The ascending aorta is mildly calcified.  Mid ascending aorta: 38.4 x 36.7 mm  Mid descending aorta: 29.9 x 27.8 mm  Narrowest abdominal Aorta diameter: 22.8 x 17.3 mm  Mild ectasia noted at the abdominal aorta.    ILIOFEMORAL RUNOFF:    Right-sided tortuosity: moderate  Right-sided calcification: Moderate, moderate to severe distally  Right common iliac minimum diameter: 9.7 x 7.8 mm  Right external iliac minimum diameter: 9.2 x 6.2 mm  Right common femoral diameter:8.5 x 5.9 mm moderate to severe   calcifications at this level. Measurements were performed at the level of   the femoral head.    Left-sided tortuosity: moderate  Left-sided calcification: Moderate to severe  Left commoniliac minimum diameter: 10.3 x11.3 mm  Left external iliac minimum diameter: 8.4 x 5.9 mm  Left common femoral diameter:10 x 7.2 mm with extensive calcification at   several. Measurements were performed at the level of the femoral head.    HEART AND CORONARY ARTERIES: The heart is normal in size. There is no   pericardial effusion. There is extensive atherosclerosis of the major   epicardial coronary arteries and their visualized branches.    Extracardiac finding section dictated separately by radiology at the end   of the final report.      IMPRESSION:    *  Annulus area is 604 mm2, bi-plane diameter of elliptical cross section   30.7 x 25.3 and annulus perimeter is 88 mm.  *  The smallest vessel diameter in the pelvis is 5.9 x 8.5 mm on the  right and 5.9 x 8.4 mm on the left.    Extracardiac Findings Only (Cardiac and aortic findings were not reviewed   by the radiologist)  Comparison: 8/25/2021    NOTE: Partial chest, abdomen and pelvis are out of the field-of-view.    Findings:    CHEST:  Partially imaged lungs demonstrate upper lung predominant emphysematous   changes. No mass or consolidation. No pneumothorax or pleural effusion.  No lymphadenopathy by size criteria.  Cardiac findings dictated separately.    ABDOMEN (arterial phase limited):  Postcholecystectomy. Spleen, liver, and adrenal glands are within normal   limits. Pancreatic calcifications likely reflecting sequela of chronic   pancreatitis.  Symmetric enhancement of the kidneys without hydronephrosis.  Colonic diverticulosis. No bowel obstruction, pneumatosis,   pneumoperitoneum, or ascites.  Prostamegaly.    BONES:  No acute osseous abnormality. Bony degenerative changes.    IMPRESSION:    No acute intrathoracic or abdominopelvic abnormality.    Upper lung predominant emphysema. If patient meets criteria, consider   enrollment into a lung cancer screening program with annual low-dose   chest CT.    Cardiac and aortic findings independently dictated by the Cardiology   Attending as above.    Jose Manuel De Paz [phone] M.D., Attending Cardiologist  Robert Bland M.D., Attending Radiologist     77 y/o male with PMHx: HTN, HLD, PAD s/p stent 2018, CAD h/o CABG 2004 (LIMA- DIAG/LAD) / inferior STEMI 2018, AS, + smoker presented to cardiologists office with c/o continued SOB and fatigue. Recent LHC on 5/2023 significant for LM and RCA disease. ECHO 4/26: EF 56% , Moderate-to-severe aortic stenosis (Vmax 3.05m/s, mean PG 20.1mmHg, DI 0.25, PINKY 0.83). The valve appears heavily calcified and severely stenotic on short axis imaging. Patient presents today for LHC with possible intervention.     LHC 5/2023  - LM and RCA disease  - Patent LIMA sequential to LAD/Diag  - mRCA focal instent restenosis and distal RCA stenosis  - LCx not visualized (LM occlusion) and not revascularized  - Normal RH pressures; low cardiac output and LV SV, moderate-severe AS (paradoxical LFLG).    Pre cath note:    indication:  [ ] STEMI                [ ] NSTEMI                 [ ] Acute coronary syndrome                     [ ]Unstable Angina   [ ] high risk  [ ] intermediate risk  [ ] low risk                     [ ] Stable Angina     non-invasive testing:                          Date:   06/22/2023                    result: [ ] high risk  [x ] intermediate risk  [ ] low risk    Anti- Anginal medications:                    [ ] not used                       [x] used :      [x ]CCB  [x ] BB  [ ] Nitrate [ ] Ranexa                [ ] not used but strong indication not to use    Ejection Fraction                   [ ] <29            [ ] 30-39%   [ ] 40-49%     [x ]>50%    CHF                   [ ] active (within last 14 days on meds   [ ] Chronic (on meds but no exacerbation)    COPD                   [ ] mild (on chronic bronchodilators)  [ ] moderate (on chronic steroid therapy)      [ ] severe (indication for home O2 or PACO2 >50)    Other risk factors:                       [ x] Previous MI                     [ ] CVA/ stroke                    [ ] carotid stent/ CEA                    [x ] PVD/PAD- (arterial aneurysm, non-palpable pulses, tortuous vessel with inability to insert catheter, infra-renal dissection, renal or subclavian artery stenosis)                    [ ] diabetic                    [ x] previous CABG                    [ ] Renal Failure       Right Maurice Test: Positive    Adjusted Cath Bleeding Risk: 5.4%    Pre-hydration:  cc/hr    EF, 4/23: 56%     EKG, date:   - Rec TAVR evaluation and PCI of RCA    < from: CT Angio Heart Structural w/ IV Cont (06.22.23 @ 11:55) >  ACC: [id number] EXAM:  CT ANGIO HEART STRUCTURAL IC   ORDERED BY: LESLIE CASTILLO     PROCEDURE DATE:  06/22/2023        INTERPRETATION:  CLINICAL INDICATION: Aortic stenosis, evaluate for   transcatheter aortic valve implantation.    TECHNIQUE: CT angiogram of the chest, abdomen and pelvis was performed.    ECG-gated acquisition through the chest and ungated acquisition through   the abdomen and pelvis in the arterial phase of contrast enhancement.   Sagittal and coronal reformats were performed as well as 3D   reconstructions.    CONTRAST:  110 cc of Omnipaque 350    COMPARISON: None.    FINDINGS:    ANNULUS/AORTA:  Annular measurements were performed using images reconstructed during  systole - RR cycle -  35%      VALVULAR CALCIFICATION AND AORTIC ROOT MEASUREMENTS:  The aortic valve is trileaflet.  The aortic valve is severely calcified. Aortic valve calcium score is 2825  Protrusion of aortic valve calcifications into the outflow tract: Minimal  Calcifications of the aorto-mitral continuity: Moderate  Mitral annular calcifications: Severe    Annulus Area = 604 mm2  Annulus Perimeter = 88 mm  Annulus bi-plane diameter of elliptical cross section = 30.7 X 25.3 mm  Sinuses of Valsalva diameter= 35.5 X 38.2 X 36.1 mm (Cusp to commissure)  The left main coronary artery arises 16.5 mm from the aortic annulus.  The right coronary artery arises 18.7 mm from the aortic annulus.  Left sinus of valsalva tyrone = 20.9 mm  Right sinus of valsalva tyrone = 23.1 mm  Non-coronary sinus of valsalva tyrone = 19.5 mm  Left ventricular outflow tract = 33.6 x 23.4 mm  Sinotubular junction:  33.8 x 30.5 mm    Aortic root angulation with respect to axial plane: 55 degrees  Optimal fluoroscopic angles Italian 5 CAU 14    AORTA:  The ascending aorta is mildly calcified.  Mid ascending aorta: 38.4 x 36.7 mm  Mid descending aorta: 29.9 x 27.8 mm  Narrowest abdominal Aorta diameter: 22.8 x 17.3 mm  Mild ectasia noted at the abdominal aorta.    ILIOFEMORAL RUNOFF:    Right-sided tortuosity: moderate  Right-sided calcification: Moderate, moderate to severe distally  Right common iliac minimum diameter: 9.7 x 7.8 mm  Right external iliac minimum diameter: 9.2 x 6.2 mm  Right common femoral diameter:8.5 x 5.9 mm moderate to severe   calcifications at this level. Measurements were performed at the level of   the femoral head.    Left-sided tortuosity: moderate  Left-sided calcification: Moderate to severe  Left commoniliac minimum diameter: 10.3 x11.3 mm  Left external iliac minimum diameter: 8.4 x 5.9 mm  Left common femoral diameter:10 x 7.2 mm with extensive calcification at   several. Measurements were performed at the level of the femoral head.    HEART AND CORONARY ARTERIES: The heart is normal in size. There is no   pericardial effusion. There is extensive atherosclerosis of the major   epicardial coronary arteries and their visualized branches.    Extracardiac finding section dictated separately by radiology at the end   of the final report.      IMPRESSION:    *  Annulus area is 604 mm2, bi-plane diameter of elliptical cross section   30.7 x 25.3 and annulus perimeter is 88 mm.  *  The smallest vessel diameter in the pelvis is 5.9 x 8.5 mm on the  right and 5.9 x 8.4 mm on the left.    Extracardiac Findings Only (Cardiac and aortic findings were not reviewed   by the radiologist)  Comparison: 8/25/2021    NOTE: Partial chest, abdomen and pelvis are out of the field-of-view.    Findings:    CHEST:  Partially imaged lungs demonstrate upper lung predominant emphysematous   changes. No mass or consolidation. No pneumothorax or pleural effusion.  No lymphadenopathy by size criteria.  Cardiac findings dictated separately.    ABDOMEN (arterial phase limited):  Postcholecystectomy. Spleen, liver, and adrenal glands are within normal   limits. Pancreatic calcifications likely reflecting sequela of chronic   pancreatitis.  Symmetric enhancement of the kidneys without hydronephrosis.  Colonic diverticulosis. No bowel obstruction, pneumatosis,   pneumoperitoneum, or ascites.  Prostamegaly.    BONES:  No acute osseous abnormality. Bony degenerative changes.    IMPRESSION:    No acute intrathoracic or abdominopelvic abnormality.    Upper lung predominant emphysema. If patient meets criteria, consider   enrollment into a lung cancer screening program with annual low-dose   chest CT.    Cardiac and aortic findings independently dictated by the Cardiology   Attending as above.    Jose Manuel De Paz [phone] M.D., Attending Cardiologist  Robert Bland M.D., Attending Radiologist

## 2023-08-01 ENCOUNTER — OUTPATIENT (OUTPATIENT)
Dept: INPATIENT UNIT | Facility: HOSPITAL | Age: 78
LOS: 1 days | Discharge: HOME | End: 2023-08-01
Payer: MEDICARE

## 2023-08-01 VITALS
DIASTOLIC BLOOD PRESSURE: 79 MMHG | RESPIRATION RATE: 15 BRPM | TEMPERATURE: 98 F | HEART RATE: 81 BPM | WEIGHT: 162.04 LBS | SYSTOLIC BLOOD PRESSURE: 136 MMHG | OXYGEN SATURATION: 97 % | HEIGHT: 68.11 IN

## 2023-08-01 DIAGNOSIS — Z95.1 PRESENCE OF AORTOCORONARY BYPASS GRAFT: Chronic | ICD-10-CM

## 2023-08-01 DIAGNOSIS — I25.118 ATHEROSCLEROTIC HEART DISEASE OF NATIVE CORONARY ARTERY WITH OTHER FORMS OF ANGINA PECTORIS: ICD-10-CM

## 2023-08-01 DIAGNOSIS — I35.0 NONRHEUMATIC AORTIC (VALVE) STENOSIS: ICD-10-CM

## 2023-08-01 DIAGNOSIS — Z90.49 ACQUIRED ABSENCE OF OTHER SPECIFIED PARTS OF DIGESTIVE TRACT: Chronic | ICD-10-CM

## 2023-08-01 DIAGNOSIS — Z98.62 PERIPHERAL VASCULAR ANGIOPLASTY STATUS: Chronic | ICD-10-CM

## 2023-08-01 DIAGNOSIS — I25.84 CORONARY ATHEROSCLEROSIS DUE TO CALCIFIED CORONARY LESION: ICD-10-CM

## 2023-08-01 DIAGNOSIS — Z95.9 PRESENCE OF CARDIAC AND VASCULAR IMPLANT AND GRAFT, UNSPECIFIED: Chronic | ICD-10-CM

## 2023-08-01 LAB
ANION GAP SERPL CALC-SCNC: 9 MMOL/L — SIGNIFICANT CHANGE UP (ref 7–14)
BUN SERPL-MCNC: 16 MG/DL — SIGNIFICANT CHANGE UP (ref 10–20)
CALCIUM SERPL-MCNC: 9.7 MG/DL — SIGNIFICANT CHANGE UP (ref 8.4–10.5)
CHLORIDE SERPL-SCNC: 104 MMOL/L — SIGNIFICANT CHANGE UP (ref 98–110)
CO2 SERPL-SCNC: 23 MMOL/L — SIGNIFICANT CHANGE UP (ref 17–32)
CREAT SERPL-MCNC: 1 MG/DL — SIGNIFICANT CHANGE UP (ref 0.7–1.5)
EGFR: 78 ML/MIN/1.73M2 — SIGNIFICANT CHANGE UP
GLUCOSE SERPL-MCNC: 106 MG/DL — HIGH (ref 70–99)
HCT VFR BLD CALC: 39.2 % — LOW (ref 42–52)
HCT VFR BLD CALC: 42.8 % — SIGNIFICANT CHANGE UP (ref 42–52)
HGB BLD-MCNC: 13.5 G/DL — LOW (ref 14–18)
HGB BLD-MCNC: 14.7 G/DL — SIGNIFICANT CHANGE UP (ref 14–18)
MCHC RBC-ENTMCNC: 30.9 PG — SIGNIFICANT CHANGE UP (ref 27–31)
MCHC RBC-ENTMCNC: 31.3 PG — HIGH (ref 27–31)
MCHC RBC-ENTMCNC: 34.3 G/DL — SIGNIFICANT CHANGE UP (ref 32–37)
MCHC RBC-ENTMCNC: 34.4 G/DL — SIGNIFICANT CHANGE UP (ref 32–37)
MCV RBC AUTO: 89.9 FL — SIGNIFICANT CHANGE UP (ref 80–94)
MCV RBC AUTO: 90.7 FL — SIGNIFICANT CHANGE UP (ref 80–94)
NRBC # BLD: 0 /100 WBCS — SIGNIFICANT CHANGE UP (ref 0–0)
NRBC # BLD: 0 /100 WBCS — SIGNIFICANT CHANGE UP (ref 0–0)
PLATELET # BLD AUTO: 111 K/UL — LOW (ref 130–400)
PLATELET # BLD AUTO: 132 K/UL — SIGNIFICANT CHANGE UP (ref 130–400)
PMV BLD: 10.7 FL — HIGH (ref 7.4–10.4)
PMV BLD: 10.8 FL — HIGH (ref 7.4–10.4)
POTASSIUM SERPL-MCNC: 4.3 MMOL/L — SIGNIFICANT CHANGE UP (ref 3.5–5)
POTASSIUM SERPL-SCNC: 4.3 MMOL/L — SIGNIFICANT CHANGE UP (ref 3.5–5)
RBC # BLD: 4.32 M/UL — LOW (ref 4.7–6.1)
RBC # BLD: 4.76 M/UL — SIGNIFICANT CHANGE UP (ref 4.7–6.1)
RBC # FLD: 13 % — SIGNIFICANT CHANGE UP (ref 11.5–14.5)
RBC # FLD: 13 % — SIGNIFICANT CHANGE UP (ref 11.5–14.5)
SODIUM SERPL-SCNC: 136 MMOL/L — SIGNIFICANT CHANGE UP (ref 135–146)
WBC # BLD: 5.44 K/UL — SIGNIFICANT CHANGE UP (ref 4.8–10.8)
WBC # BLD: 6.39 K/UL — SIGNIFICANT CHANGE UP (ref 4.8–10.8)
WBC # FLD AUTO: 5.44 K/UL — SIGNIFICANT CHANGE UP (ref 4.8–10.8)
WBC # FLD AUTO: 6.39 K/UL — SIGNIFICANT CHANGE UP (ref 4.8–10.8)

## 2023-08-01 PROCEDURE — 85027 COMPLETE CBC AUTOMATED: CPT

## 2023-08-01 PROCEDURE — 92978 ENDOLUMINL IVUS OCT C 1ST: CPT | Mod: 26,RC,XU

## 2023-08-01 PROCEDURE — C1874: CPT

## 2023-08-01 PROCEDURE — 80048 BASIC METABOLIC PNL TOTAL CA: CPT

## 2023-08-01 PROCEDURE — 93010 ELECTROCARDIOGRAM REPORT: CPT

## 2023-08-01 PROCEDURE — C1725: CPT

## 2023-08-01 PROCEDURE — 92928 PRQ TCAT PLMT NTRAC ST 1 LES: CPT | Mod: LD

## 2023-08-01 PROCEDURE — 93005 ELECTROCARDIOGRAM TRACING: CPT

## 2023-08-01 PROCEDURE — 92978 ENDOLUMINL IVUS OCT C 1ST: CPT | Mod: RC

## 2023-08-01 PROCEDURE — C1894: CPT

## 2023-08-01 PROCEDURE — C1769: CPT

## 2023-08-01 PROCEDURE — C1753: CPT

## 2023-08-01 PROCEDURE — C1887: CPT

## 2023-08-01 PROCEDURE — 36415 COLL VENOUS BLD VENIPUNCTURE: CPT

## 2023-08-01 NOTE — CHART NOTE - NSCHARTNOTEFT_GEN_A_CORE
PRE-OP DIAGNOSIS: Stable angina, staged PCI of RC    PROCEDURE:   - Coronary Angiogram    - Intervention (see below)     Physician: Dr Broussard  Interventional Fellow:   General Cardiology Fellow: Dr Valdovinos + Dr. Gaurav Mejia     ANESTHESIA TYPE:  [  ]General Anesthesia  [ x ] Sedation  [x  ] Local/Regional    ESTIMATED BLOOD LOSS:  50 mL    CONDITION  [  ] Critical  [  ] Serious  [  ]Fair  [ x ]Good      SPECIMENS REMOVED (IF APPLICABLE): N/A      IV CONTRAST:  175   mL    INTERVENTION: IVUS of RCA, angioscoped of RCA, wolverine of RCA. MARISOL # 2 TO mRCA and dRCA.    IMPLANTS: SYNERGY 3.5 x 20 MM     FINDINGS    - LVEF%: 56% on TTE 4/23    DOMINANCE: Right    Left main: non injected    LAD:   non injected     Diag1: non injected  Diag2: non injected    Left Circumflex: non injected  OM1: non injected  OM2: non injected    Right Coronary Artery: RCA 90% mRCA in stent restenosis  RPDA: MODERATE DISEASE    Ramus Intermedius: non injected       POST-OP DIAGNOSIS: successful MARISOL TO RCA # 2 to mid and distal portions. AUC 7     [] Normal Coronary Angiogram     [] Mild Coronary Artery Disease (< 50% stenosis)     [x] 1 Vessel Coronary Artery Disease       PLAN OF CARE  [x ] D/C Home today  [ ] Return to In-patient bed  [ ] Admit for observation  [ ] Return for staged procedure:  [ ] CT Surgery consult called  [x ]  Continue DAPT, B-blocker & Statin therapy PRE-OP DIAGNOSIS: Stable angina, staged PCI of RC    PROCEDURE:   - Coronary Angiogram    - Intervention (see below)     Physician: Dr Broussard  Interventional Fellow:   General Cardiology Fellow: Dr Valdovinos + Dr. Gaurav Mejia     ANESTHESIA TYPE:  [  ]General Anesthesia  [ x ] Sedation  [x  ] Local/Regional    ESTIMATED BLOOD LOSS:  50 mL    CONDITION  [  ] Critical  [  ] Serious  [  ]Fair  [ x ]Good      SPECIMENS REMOVED (IF APPLICABLE): N/A      IV CONTRAST:  175   mL    INTERVENTION: IVUS of RCA, angioscoped of RCA, wolverine of RCA. MARISOL # 2 TO mRCA and dRCA.    IMPLANTS: SYNERGY 3.5 x 20 MM     FINDINGS    - LVEF%: 56% on TTE 4/23    DOMINANCE: Right    Left main: non injected    LAD:   non injected     Diag1: non injected  Diag2: non injected    Left Circumflex: non injected  OM1: non injected  OM2: non injected    Right Coronary Artery: RCA 90% mRCA in stent restenosis  RPDA: MODERATE DISEASE    Ramus Intermedius: non injected       POST-OP DIAGNOSIS: successful MARISOL TO RCA # 2 to mid and distal portions. AUC 7     [] Normal Coronary Angiogram     [] Mild Coronary Artery Disease (< 50% stenosis)     [x] 1 Vessel Coronary Artery Disease       PLAN OF CARE  [x ] D/C Home today  [ ] Return to In-patient bed  [ ] Admit for observation  [ ] Return for staged procedure:  [ ] CT Surgery consult called  [x ]  Continue DAPT, B-blocker & Statin therapy    SEE CATH REPORT FOR ATTENDING IMPRESSION

## 2023-08-01 NOTE — PROGRESS NOTE ADULT - SUBJECTIVE AND OBJECTIVE BOX
Cardiology Follow up s/p PCI MARISOL    JEAN PAUL   78y Male  PAST MEDICAL & SURGICAL HISTORY:    CAD (coronary artery disease)      Hypertension      Myocardial infarction      Broken ribs  6 fall from ladder 2019/ 1 from fall in 2021      Aortic stenosis      History of cholecystectomy      S/P CABG x 2  2004      S/P arterial stent  2013 right leg?      H/O angioplasty  1 cardiac stent 2018      HPI:  77 y/o male with PMHx: HTN, HLD, PAD s/p stent 2018, CAD h/o CABG 2004 (LIMA- DIAG/LAD) / inferior STEMI 2018, AS, + smoker presented to cardiologists office with c/o continued SOB and fatigue. Recent LHC on 5/2023 significant for LM and RCA disease. ECHO 4/26: EF 56% , Moderate-to-severe aortic stenosis (Vmax 3.05m/s, mean PG 20.1mmHg, DI 0.25, PINKY 0.83). The valve appears heavily calcified and severely stenotic on short axis imaging. Patient presents today for LHC with possible intervention.     Right Maurice Test: Positive    Adjusted Cath Bleeding Risk: 5.4%    Pre-hydration:  cc/hr    EF, 4/23: 56%     EKG, date:   - Rec TAVR evaluation and PCI of RCA    Allergies    No Known Allergies      T(C): 36.7 (01 Aug 2023 09:29), Max: 36.7 (01 Aug 2023 09:29)  T(F): 98 (01 Aug 2023 09:29), Max: 98 (01 Aug 2023 09:29)  HR: 81 (01 Aug 2023 09:29) (81 - 81)  BP: 136/79 (01 Aug 2023 09:29) (136/79 - 136/79)  BP(mean): --  RR: 15 (01 Aug 2023 09:29) (15 - 15)  SpO2: 97% (01 Aug 2023 09:29) (97% - 97%)      REVIEW OF SYSTEMS:          All negative except as mentioned in HPI    PHYSICAL EXAM:           CONSTITUTIONAL: Well-developed; well-nourished; in no acute distress  	SKIN: warm, dry  	HEAD: Normocephalic; atraumatic  	EYES: PERRL.  	ENT: No nasal discharge, airway clear, mucous membranes moist  	NECK: Supple; non tender.  	CARD: +S1, +S2, no murmurs, gallops, or rubs. Regular rate and rhythm    	RESP: No wheezes, rales or rhonchi. CTA B/L  	ABD: soft ntnd, + BS x 4 quadrants  	EXT: moves all extremities,  no clubbing, cyanosis or edema  	NEURO: Alert and oriented x3, no focal deficits          PSYCH: Cooperative, appropriate          VASCULAR:  + Rad / + PTs / +  DPs          EXTREMITY:              Right Radial: Dressing D/C/I, access site soft, no hematoma, no pain, + pulses, no sign of infection, no numbness            ECG:   P    CBC:  P                                                                                                                LABS:                        14.7   6.39  )-----------( 132      ( 01 Aug 2023 09:29 )             42.8     08-01    136  |  104  |  16  ----------------------------<  106<H>  4.3   |  23  |  1.0    Ca    9.7      01 Aug 2023 09:29    A/P:  I discussed the case with Cardiologist Dr. Broussard and recommend the following:  S/P PCI:  INTERVENTION: IVUS of RCA, angioscoped of RCA, wolverine of RCA. MARISOL # 2 TO mRCA and dRCA.    IMPLANTS: SYNERGY 3.5 x 20 MM     FINDINGS    - LVEF%: 56% on TTE 4/23    DOMINANCE: Right    Left main: non injected    LAD:   non injected     Diag1: non injected  Diag2: non injected    Left Circumflex: non injected  OM1: non injected  OM2: non injected    Right Coronary Artery: RCA 90% mRCA in stent restenosis  RPDA: MODERATE DISEASE    Ramus Intermedius: non injected                         CBC/ECG at 5 pm                    cc/hr x 6hr  	     Continue DAPT ( Aspirin 81 mg daily and Plavix 75 mg daily ), ACEi, B-Blocker, Statin Therapy                   Patient given 30 day supply of ( Aspirin 81 mg daily and Plavix 75 mg daily ) to take at home                   Patient agreeing to take DAPT for at least one year or as directed by cardiologist                    Pt given instructions on importance of taking antiplatelet medication or risk acute stent thrombosis/death                   Post cath instructions, access site care and activity restrictions reviewed with patient                     Discussed with patient to return to hospital if experience chest pain, shortness breath, dizziness and site bleeding                   Aggressive risk factor modification, diet counseling, smoking cessation discussed with patient                       Can discharge patient from cardiac standpoint at 7 pm after ambulating without symptoms and access site wnl, ECG and blood work reviewed                    Benefits of Cardiac Rehab discussed with patient, All documents sent to [facility]. Patient instructed to call and make first                               appointment after first f/u visit with Cardiologist                    Follow up with Cardiology Dr. Jha in two weeks. Instructed to call and make an appointment                                             Cardiology Follow up s/p PCI MARISOL    JEAN PAUL   78y Male  PAST MEDICAL & SURGICAL HISTORY:    CAD (coronary artery disease)      Hypertension      Myocardial infarction      Broken ribs  6 fall from ladder 2019/ 1 from fall in 2021      Aortic stenosis      History of cholecystectomy      S/P CABG x 2  2004      S/P arterial stent  2013 right leg?      H/O angioplasty  1 cardiac stent 2018      HPI:  79 y/o male with PMHx: HTN, HLD, PAD s/p stent 2018, CAD h/o CABG 2004 (LIMA- DIAG/LAD) / inferior STEMI 2018, AS, + smoker presented to cardiologists office with c/o continued SOB and fatigue. Recent LHC on 5/2023 significant for LM and RCA disease. ECHO 4/26: EF 56% , Moderate-to-severe aortic stenosis (Vmax 3.05m/s, mean PG 20.1mmHg, DI 0.25, PINKY 0.83). The valve appears heavily calcified and severely stenotic on short axis imaging. Patient presents today for LHC with possible intervention.     Right Maurice Test: Positive    Adjusted Cath Bleeding Risk: 5.4%    Pre-hydration:  cc/hr    EF, 4/23: 56%     EKG, date:   - Rec TAVR evaluation and PCI of RCA    Allergies    No Known Allergies      T(C): 36.7 (01 Aug 2023 09:29), Max: 36.7 (01 Aug 2023 09:29)  T(F): 98 (01 Aug 2023 09:29), Max: 98 (01 Aug 2023 09:29)  HR: 81 (01 Aug 2023 09:29) (81 - 81)  BP: 136/79 (01 Aug 2023 09:29) (136/79 - 136/79)  BP(mean): --  RR: 15 (01 Aug 2023 09:29) (15 - 15)  SpO2: 97% (01 Aug 2023 09:29) (97% - 97%)      REVIEW OF SYSTEMS:          All negative except as mentioned in HPI    PHYSICAL EXAM:           CONSTITUTIONAL: Well-developed; well-nourished; in no acute distress  	SKIN: warm, dry  	HEAD: Normocephalic; atraumatic  	EYES: PERRL.  	ENT: No nasal discharge, airway clear, mucous membranes moist  	NECK: Supple; non tender.  	CARD: +S1, +S2, no murmurs, gallops, or rubs. Regular rate and rhythm    	RESP: No wheezes, rales or rhonchi. CTA B/L  	ABD: soft ntnd, + BS x 4 quadrants  	EXT: moves all extremities,  no clubbing, cyanosis or edema  	NEURO: Alert and oriented x3, no focal deficits          PSYCH: Cooperative, appropriate          VASCULAR:  + Rad / + PTs / +  DPs          EXTREMITY:              Right Radial: Dressing D/C/I, access site soft, no hematoma, no pain, + pulses, no sign of infection, no numbness            ECG:   P    CBC:  P                                                                                                                LABS:                        14.7   6.39  )-----------( 132      ( 01 Aug 2023 09:29 )             42.8     08-01    136  |  104  |  16  ----------------------------<  106<H>  4.3   |  23  |  1.0    Ca    9.7      01 Aug 2023 09:29    A/P:  I discussed the case with Cardiologist Dr. Broussard and recommend the following:  S/P PCI:  INTERVENTION: IVUS of RCA, angioscoped of RCA, wolverine of RCA. MARISOL # 2 TO mRCA and dRCA.    IMPLANTS: SYNERGY 3.5 x 20 MM     FINDINGS    - LVEF%: 56% on TTE 4/23    DOMINANCE: Right    Left main: non injected    LAD:   non injected     Diag1: non injected  Diag2: non injected    Left Circumflex: non injected  OM1: non injected  OM2: non injected    Right Coronary Artery: RCA 90% mRCA in stent restenosis  RPDA: MODERATE DISEASE    Ramus Intermedius: non injected                         CBC/ECG at 5 pm                    cc/hr x 6hr  	     Continue DAPT ( Aspirin 81 mg daily and Plavix 75 mg daily ), ACEi, B-Blocker, Statin Therapy                   Patient given 30 day supply of ( Aspirin 81 mg daily and Plavix 75 mg daily ) to take at home                   Patient agreeing to take DAPT for at least one year or as directed by cardiologist                    Pt given instructions on importance of taking antiplatelet medication or risk acute stent thrombosis/death                   Post cath instructions, access site care and activity restrictions reviewed with patient                     Discussed with patient to return to hospital if experience chest pain, shortness breath, dizziness and site bleeding                   Aggressive risk factor modification, diet counseling, smoking cessation discussed with patient                       Can discharge patient from cardiac standpoint at 7 pm after ambulating without symptoms and access site wnl, ECG and blood work reviewed                    Benefits of Cardiac Rehab discussed with patient, All documents sent to [facility]. Patient instructed to call and make first                               appointment after first f/u visit with Cardiologist                    Follow up with Cardiology Dr. Jha in two weeks. Instructed to call and make an appointment

## 2023-08-10 ENCOUNTER — APPOINTMENT (OUTPATIENT)
Dept: CARDIOLOGY | Facility: CLINIC | Age: 78
End: 2023-08-10
Payer: MEDICARE

## 2023-08-10 PROCEDURE — 99214 OFFICE O/P EST MOD 30 MIN: CPT

## 2023-08-10 NOTE — END OF VISIT
[FreeTextEntry3] : Feels well  s/p PCI RCA  Breathing comfortable but hasn't pushed it. BP controlled.  Cont current cardiac Rx Gradually increase activity level ECHO / follow-up 3-months.  Will ultimately need stagged TAVR.

## 2023-08-10 NOTE — ASSESSMENT
Please try ibuprofen with the Tylenol first for the back pain. A Medrol dose pack has been sent to the pharmacy as a backup for breakthrough pain. Please follow up with your primary care provider for further diagnostic studies and treatment. 21 plus minutes were use to complete this E visit. [FreeTextEntry1] : Mr. JEAN PAUL 77 year M arrives today for evaluation of their Aortic Stenosis. Patient PMH include CAD s/p CABG X 2 at [facility] CAD s/p Stent 2019, and PAD s/p Stent 2018. Here for review of Cath.    Plan: Doing well ovewrall since Cardiac Cath Wrist is healing well, no numbness ot hand/wrist Will repeat Echocardiogram October 2023 F/U after for review of Echo Continue DAPT F/U with Dr. Jha for general cardiac management F/U with PMD for routine medical care  IPhyllis FNP-BC, am acting as scribe for

## 2023-08-10 NOTE — HISTORY OF PRESENT ILLNESS
[FreeTextEntry1] : Mr. JEAN PAUL 78 year M here for their moderate to severe Aortic Stenosis. Patient PMH include CAD s/p CABG X 2 at [facility] CAD s/p Stent 2019, and PAD s/p Stent 2018. Since then he has had multiple coronary angiograms and apparently had additional stenting in 2013. Symptoms include SOB, fatigue. NYHA class II. Patient lives home alone no aid. Patient saw his cardiologist and his most current echo shows worsening of the aortic valve. He is S/P Cardiac Catheterization 8/1/2023 with PCI.  Here for F/U after Cardiac Catheterization.  He recently underwent a colonoscopy and was told that he had a cancerous polyp and he is being followed up by the GI physicians for the same. Pt lives home with wife no aide Denies family history of CAD  They're healthcare team includes the following PMD: Ana Cardio: Yudelka

## 2023-08-14 ENCOUNTER — APPOINTMENT (OUTPATIENT)
Dept: CARDIOLOGY | Facility: CLINIC | Age: 78
End: 2023-08-14
Payer: MEDICARE

## 2023-08-14 VITALS
HEART RATE: 81 BPM | WEIGHT: 159 LBS | HEIGHT: 68 IN | SYSTOLIC BLOOD PRESSURE: 112 MMHG | DIASTOLIC BLOOD PRESSURE: 60 MMHG | BODY MASS INDEX: 24.1 KG/M2

## 2023-08-14 DIAGNOSIS — I77.9 DISORDER OF ARTERIES AND ARTERIOLES, UNSPECIFIED: ICD-10-CM

## 2023-08-14 PROCEDURE — 99214 OFFICE O/P EST MOD 30 MIN: CPT | Mod: 25

## 2023-08-14 PROCEDURE — 93000 ELECTROCARDIOGRAM COMPLETE: CPT

## 2023-08-14 NOTE — ASSESSMENT
[FreeTextEntry1] : Mr. Coulter is a 77-year-old man with history of CAD s/p CABG x2 in [facility] 2004 and PCI for inferior STEMI 2018 presenting for follow up in management of CAD.  Impression: (1) CAD s/p CABG at [facility] 2004 and inferior STEMI 2018 at [facility], recent elective PCI of the RCA; stable, no angina, LDL <70 (2) Aortic stenosis, LFLG, moderate to severe, progressing over the past year with PINKY in the severe range (Vmax 3.0, mPG 20, AV 0.83) (3) HTN (4) HLD (5) Mild AAA, infrarenal measuring 2.6x2.9cm (6) Carotid artery disease with protuberant plaque  Plan: - Discussed his PCI results and the plans for eventual TAVR. - TTE q6 months with pBNP to help determine timing for intervention - Annual carotids - Continue DAPT for now given multiple events - Continue metoprolol as an antianginal - Continue high intensity statin - Continue ramipril for HTN.  RTC 6 months. Sooner as needed.

## 2023-08-14 NOTE — HISTORY OF PRESENT ILLNESS
[FreeTextEntry1] : Mr. Coulter is a 78-year-old man with history of CAD s/p CABG x2 in [facility] 2004 and PCI for inferior STEMI 2018, and moderate-to-severe aortic stenosis presenting for follow up.  Patient previously followed with Dr. Bedolla and was last seen by him in clinic on 4/27/22.  On our first visit he noted he was becoming more fatigued/short of breath when he goes for a walk. TTE demonstrated stable moderate-severe AS. I referred him to the structural team and he has since undergone [facility] demonstrating multivessel CAD as below. CT TAVR was noted to have appropriate TAVR anatomy and he subsequently underwent PCI of the RCI 8/1/23 with plans for surveillance to determine timing for TAVR.  Today feels well. Has improved stamina since PCI, stating he was able to mow his lawn without his usual fatigue this past weekend.  [facility] 5/2023 - LM and RCA disease - Patent LIMA sequential to LAD/Diag - mRCA focal instent restenosis and distal RCA stenosis - LCx not visualized (LM occlusion) and not revascularizaed - Normal RH pressures; low cardiac output and LV SV, moderate-severe AS (paradoxical LFLG). - Rec TAVR evaluation and PCI of RCA  [facility] 8/1/2023 - MARISOL to mRCA and dRCA  AAA 2023 - Infrarenal mild dilatation, 2.6x2.9cm aneurysm  Carotid Doppler 9/2022 - Bilateral <50% stenosis with protuberant plaque.  TTE 4/2023 - EF 56%, unchanged RWMAs, mod-severe AS (Vmax 3.05m/s, mean PG 20.1mmHg, DI 0.25, PINKY 0.83), mild AI  TTE 4/2022 - EF 50%, PB hypokinesis, moderate MR, mild AR, moderate AS (Vmax 3.0 m/s, mean PG 15 mmHg)  Nuclear Stress Lexiscan - Negative, no changes vs 2019  [facility] 5/2023 - Multivessel CAD (LM + RCA) - Patent LIMA [id number]/Diag - mRCA focal in-stent restenosis and dRCA stenosis - LCx not visualized 2/2 LM occlusion - Low CO an dLVSV - Mod-severe AS (paradoxical LF/LG) (PINKY 1.0cm2) - Rec: TAVRE eval, consider PCI of RCA LHC 2018 at [facility] - 100% LM, LAD, LCx - Patent LIMA-diag/LAD - RCA occluded in the mid-portion, s/p PCI with MARISOL - Distal second PL is occluded, likely thrombus.  AAA US - Infrarenal aorta with mild dilatation (2.6 x 2.9cm)  Labs: - , TG 60, HDL 36, LDL 61, non-HDL 73 - Cr 1.0, K 4.2

## 2023-08-14 NOTE — PHYSICAL EXAM
[Well Developed] : well developed [Well Nourished] : well nourished [No Acute Distress] : no acute distress [Normal Conjunctiva] : normal conjunctiva [Normal Venous Pressure] : normal venous pressure [No Carotid Bruit] : no carotid bruit [Normal S1, S2] : normal S1, S2 [No Murmur] : no murmur [No Rub] : no rub [No Gallop] : no gallop [Good Air Entry] : good air entry [No Respiratory Distress] : no respiratory distress  [Soft] : abdomen soft [Non Tender] : non-tender [No Masses/organomegaly] : no masses/organomegaly [Normal Bowel Sounds] : normal bowel sounds [Normal Gait] : normal gait [No Edema] : no edema [No Cyanosis] : no cyanosis [No Clubbing] : no clubbing [No Varicosities] : no varicosities [No Rash] : no rash [No Skin Lesions] : no skin lesions [Moves all extremities] : moves all extremities [No Focal Deficits] : no focal deficits [Normal Speech] : normal speech [Alert and Oriented] : alert and oriented [Normal memory] : normal memory [de-identified] : Faint crackles at the right base

## 2023-09-06 ENCOUNTER — LABORATORY RESULT (OUTPATIENT)
Age: 78
End: 2023-09-06

## 2023-09-07 LAB
ALBUMIN SERPL ELPH-MCNC: 4.4 G/DL
ALP BLD-CCNC: 88 U/L
ALT SERPL-CCNC: 14 U/L
ANION GAP SERPL CALC-SCNC: 13 MMOL/L
AST SERPL-CCNC: 20 U/L
BILIRUB SERPL-MCNC: 0.9 MG/DL
BUN SERPL-MCNC: 22 MG/DL
CALCIUM SERPL-MCNC: 9.7 MG/DL
CHLORIDE SERPL-SCNC: 103 MMOL/L
CHOLEST SERPL-MCNC: 144 MG/DL
CO2 SERPL-SCNC: 25 MMOL/L
CREAT SERPL-MCNC: 0.9 MG/DL
CRP SERPL HS-MCNC: 3.87 MG/L
EGFR: 89 ML/MIN/1.73M2
GLUCOSE SERPL-MCNC: 111 MG/DL
HDLC SERPL-MCNC: 36 MG/DL
LDLC SERPL CALC-MCNC: 88 MG/DL
NONHDLC SERPL-MCNC: 108 MG/DL
NT-PROBNP SERPL-MCNC: 381 PG/ML
POTASSIUM SERPL-SCNC: 5.4 MMOL/L
PROT SERPL-MCNC: 7.3 G/DL
SODIUM SERPL-SCNC: 141 MMOL/L
TRIGL SERPL-MCNC: 100 MG/DL

## 2023-09-21 ENCOUNTER — APPOINTMENT (OUTPATIENT)
Dept: CARDIOLOGY | Facility: CLINIC | Age: 78
End: 2023-09-21
Payer: MEDICARE

## 2023-09-21 VITALS
SYSTOLIC BLOOD PRESSURE: 124 MMHG | HEART RATE: 89 BPM | HEIGHT: 68 IN | WEIGHT: 162 LBS | BODY MASS INDEX: 24.55 KG/M2 | OXYGEN SATURATION: 96 % | DIASTOLIC BLOOD PRESSURE: 80 MMHG

## 2023-09-21 PROCEDURE — 99214 OFFICE O/P EST MOD 30 MIN: CPT

## 2023-10-12 ENCOUNTER — APPOINTMENT (OUTPATIENT)
Dept: CARDIOLOGY | Facility: CLINIC | Age: 78
End: 2023-10-12

## 2023-10-12 ENCOUNTER — TRANSCRIPTION ENCOUNTER (OUTPATIENT)
Age: 78
End: 2023-10-12

## 2023-10-12 ENCOUNTER — APPOINTMENT (OUTPATIENT)
Dept: CARDIOLOGY | Facility: CLINIC | Age: 78
End: 2023-10-12
Payer: MEDICARE

## 2023-10-12 PROCEDURE — 93306 TTE W/DOPPLER COMPLETE: CPT

## 2023-10-12 PROCEDURE — 93880 EXTRACRANIAL BILAT STUDY: CPT

## 2023-10-19 ENCOUNTER — APPOINTMENT (OUTPATIENT)
Dept: CARDIOTHORACIC SURGERY | Facility: CLINIC | Age: 78
End: 2023-10-19
Payer: MEDICARE

## 2023-10-19 ENCOUNTER — APPOINTMENT (OUTPATIENT)
Dept: CARDIOLOGY | Facility: CLINIC | Age: 78
End: 2023-10-19

## 2023-10-19 VITALS
TEMPERATURE: 97.6 F | SYSTOLIC BLOOD PRESSURE: 114 MMHG | WEIGHT: 162 LBS | DIASTOLIC BLOOD PRESSURE: 75 MMHG | BODY MASS INDEX: 24.55 KG/M2 | RESPIRATION RATE: 14 BRPM | OXYGEN SATURATION: 97 % | HEART RATE: 90 BPM | HEIGHT: 68 IN

## 2023-10-19 PROCEDURE — 99212 OFFICE O/P EST SF 10 MIN: CPT

## 2023-11-20 ENCOUNTER — RX RENEWAL (OUTPATIENT)
Age: 78
End: 2023-11-20

## 2024-01-21 LAB
ALBUMIN SERPL ELPH-MCNC: 4.2 G/DL
ALP BLD-CCNC: 86 U/L
ALT SERPL-CCNC: 17 U/L
ANION GAP SERPL CALC-SCNC: 13 MMOL/L
AST SERPL-CCNC: 23 U/L
BILIRUB SERPL-MCNC: 0.9 MG/DL
BUN SERPL-MCNC: 14 MG/DL
CALCIUM SERPL-MCNC: 9.4 MG/DL
CHLORIDE SERPL-SCNC: 104 MMOL/L
CHOLEST SERPL-MCNC: 122 MG/DL
CO2 SERPL-SCNC: 25 MMOL/L
CREAT SERPL-MCNC: 0.9 MG/DL
CRP SERPL HS-MCNC: 1.07 MG/L
EGFR: 89 ML/MIN/1.73M2
GLUCOSE SERPL-MCNC: 112 MG/DL
HCT VFR BLD CALC: 43.4 %
HDLC SERPL-MCNC: 34 MG/DL
HGB BLD-MCNC: 14.4 G/DL
LDLC SERPL CALC-MCNC: 66 MG/DL
MCHC RBC-ENTMCNC: 31.6 PG
MCHC RBC-ENTMCNC: 33.2 G/DL
MCV RBC AUTO: 95.2 FL
NONHDLC SERPL-MCNC: 88 MG/DL
NT-PROBNP SERPL-MCNC: 342 PG/ML
PLATELET # BLD AUTO: 120 K/UL
PMV BLD: 10.8 FL
POTASSIUM SERPL-SCNC: 4.3 MMOL/L
PROT SERPL-MCNC: 7.5 G/DL
RBC # BLD: 4.56 M/UL
RBC # FLD: 13.2 %
SODIUM SERPL-SCNC: 142 MMOL/L
TRIGL SERPL-MCNC: 110 MG/DL
WBC # FLD AUTO: 7.41 K/UL

## 2024-01-23 NOTE — ASSESSMENT
Mable with [address] called stating the pt has filed a claim with them and needs to fax the forms over to the PCP.  I provided the fax number for the office and asked that she put it ATTN: Dr. Mendel Esqueda.   She verbalized understanding  [address] CB#: [phone]   [FreeTextEntry1] : Mr. JEAN PAUL 77 year M arrives today for evaluation of their Aortic Stenosis. Patient PMH include CAD s/p CABG X 2 at [facility]), CAD s/p Stent 2019, and PAD s/p Stent 2018.  Here for review of Cath. \par \par Plan:\par Reviewed Cardiac Catheterization with patient and wife\par Has coronary disease that would require intervention\par He has a CTA as scheduled 6/22\par Televisit following week for review\par If anatomy not favorable for TAVR will plan for CABG/AVR\par If can get TAVR according to CTA will plan for PCI and repeat Echo in 6 months\par \par Phyllis MEJIA FNP-BC, am acting as scribe for  \par

## 2024-02-12 ENCOUNTER — APPOINTMENT (OUTPATIENT)
Dept: CARDIOLOGY | Facility: CLINIC | Age: 79
End: 2024-02-12
Payer: MEDICARE

## 2024-02-12 VITALS
HEART RATE: 71 BPM | HEIGHT: 68 IN | DIASTOLIC BLOOD PRESSURE: 70 MMHG | SYSTOLIC BLOOD PRESSURE: 112 MMHG | WEIGHT: 162 LBS | BODY MASS INDEX: 24.55 KG/M2

## 2024-02-12 PROCEDURE — 93000 ELECTROCARDIOGRAM COMPLETE: CPT

## 2024-02-12 PROCEDURE — 99214 OFFICE O/P EST MOD 30 MIN: CPT | Mod: 25

## 2024-02-12 NOTE — HISTORY OF PRESENT ILLNESS
[FreeTextEntry1] : Mr. Coulter is a 78-year-old man with history of CAD s/p CABG x2 in [facility] 2004 and PCI for inferior STEMI 2018, and moderate-to-severe aortic stenosis presenting for follow up.  Patient previously followed with Dr. Bedolla and was last seen by him in clinic on 4/27/22.  On our first visit he noted he was becoming more fatigued/short of breath when he goes for a walk. TTE demonstrated stable moderate-severe AS. I referred him to the structural team and he has since undergone [facility] demonstrating multivessel CAD as below. CT TAVR was noted to have appropriate TAVR anatomy and he subsequently underwent PCI of the RCI 8/1/23 with plans for surveillance to determine timing for TAVR.  Last visit: has improved stamina since PCI, stating he was able to mow his lawn without his usual fatigue this past weekend.  Today continues to feel well, stable, no overt symptoms of dyspnea or chest discomfort. Complains of a chronic cough (months) in the setting of taking ramipril.  [facility] 8/1/2023 - MARISOL to mRCA and dRCA  AAA 2023 - Infrarenal mild dilatation, 2.6x2.9cm aneurysm  Carotid Doppler 9/2022:  Bilateral <50% stenosis with protuberant plaque. Carotid Doppler 10/2023: mild bilateral ICA stenoses  TTE 4/2023 - EF 56%, unchanged RWMAs, mod-severe AS (Vmax 3.05m/s, mean PG 20.1mmHg, DI 0.25, PINKY 0.83), mild AI TTE 4/2022 - EF 50%, PB hypokinesis, moderate MR, mild AR, moderate AS (Vmax 3.0 m/s, mean PG 15 mmHg)  Nuclear Stress Lexiscan - Negative, no changes vs 2019  [facility] 8/2023 - Successful PCI of the RCA  [facility] 5/2023 - Multivessel CAD (LM + RCA) - Patent LIMA [id number]/Diag - mRCA focal in-stent restenosis and dRCA stenosis - LCx not visualized 2/2 LM occlusion - Low CO an dLVSV - Mod-severe AS (paradoxical LF/LG) (PINKY 1.0cm2) - Rec: TAVR eval, consider PCI of RCA  [facility] 2018 at [facility] - 100% LM, LAD, LCx - Patent LIMA-diag/LAD - RCA occluded in the mid-portion, s/p PCI with MARISOL - Distal second PL is occluded, likely thrombus.  AAA US 5/2023 - Infrarenal aorta with mild dilatation (2.6 x 2.9cm)  Labs: - , TG 60, HDL 36, LDL 61, non-HDL 73 -> LDL 66, HDL 34, ,  - Cr 1.0, K 4.2 -> Cr 0.9, K 4.3, normla transaminases - A1c 6.2%, pBNP 381 -> 342, hsCRP 1.07

## 2024-02-12 NOTE — ASSESSMENT
[FreeTextEntry1] : Mr. Coulter is a 78-year-old man with history of CAD (s/p CABG 2004, PCI for inferior STEMI 2018, elective PCI f the RCA 2023) and moderate-to-severe AS, presenting for follow up in management of AS and CAD.  Impression: (1) CAD s/p CABG at [facility] 2004 and inferior STEMI 2018 at [facility], recent elective PCI of the RCA; stable, no angina, LDL 88 (2) Aortic stenosis, LFLG, moderate to severe, progressing over the past year with PINKY in the severe range (Vmax 3.0, mPG 20, AV 0.83) (3) HTN (4) HLD (5) Mild AAA, infrarenal measuring 2.6x2.9cm (6) Carotid artery disease with protuberant plaque  Plan: - Discussed his PCI results and the plans for eventual TAVR. - TTE q6 months with pBNP to help determine timing for intervention - Annual AAA US, due this summer - Continue DAPT for now given multiple events - Continue metoprolol as an antianginal - Continue high intensity statin - Switch ramipril to an ARB due to cough.  RTC 6 months. Sooner as needed.

## 2024-02-12 NOTE — PHYSICAL EXAM
[Well Developed] : well developed [Well Nourished] : well nourished [No Acute Distress] : no acute distress [Normal Conjunctiva] : normal conjunctiva [Normal Venous Pressure] : normal venous pressure [No Carotid Bruit] : no carotid bruit [Normal S1, S2] : normal S1, S2 [No Murmur] : no murmur [No Rub] : no rub [No Gallop] : no gallop [Good Air Entry] : good air entry [No Respiratory Distress] : no respiratory distress  [Soft] : abdomen soft [Non Tender] : non-tender [No Masses/organomegaly] : no masses/organomegaly [Normal Bowel Sounds] : normal bowel sounds [Normal Gait] : normal gait [No Edema] : no edema [No Cyanosis] : no cyanosis [No Clubbing] : no clubbing [No Varicosities] : no varicosities [No Rash] : no rash [No Skin Lesions] : no skin lesions [Moves all extremities] : moves all extremities [No Focal Deficits] : no focal deficits [Normal Speech] : normal speech [Alert and Oriented] : alert and oriented [Normal memory] : normal memory [de-identified] : Faint crackles at the right base

## 2024-04-15 ENCOUNTER — APPOINTMENT (OUTPATIENT)
Dept: CARDIOLOGY | Facility: CLINIC | Age: 79
End: 2024-04-15
Payer: MEDICARE

## 2024-04-15 PROCEDURE — 93306 TTE W/DOPPLER COMPLETE: CPT

## 2024-04-25 ENCOUNTER — APPOINTMENT (OUTPATIENT)
Dept: CARDIOLOGY | Facility: CLINIC | Age: 79
End: 2024-04-25
Payer: MEDICARE

## 2024-04-25 VITALS
OXYGEN SATURATION: 95 % | HEART RATE: 79 BPM | DIASTOLIC BLOOD PRESSURE: 82 MMHG | HEIGHT: 68 IN | BODY MASS INDEX: 24.55 KG/M2 | WEIGHT: 162 LBS | SYSTOLIC BLOOD PRESSURE: 125 MMHG | RESPIRATION RATE: 12 BRPM | TEMPERATURE: 98 F

## 2024-04-25 PROCEDURE — 99443: CPT

## 2024-04-25 NOTE — ASSESSMENT
[FreeTextEntry1] : Plan: Mr. Coulter arrives today for evaluation of his aortic stenosis with a recent echo.  #CAD s/p Stents Doing well S/P PCI of the RCA Following with general Cardio Denies CP Continue ASA and Plavix   #Aortic Stenosis Current Echo reviewed showed worsening aortic stenosis. SVI  32 PINKY 0.83 Does endorse SOB on exertion. Reviewed imaging with patient Plan for PFT, and scheduling

## 2024-04-25 NOTE — END OF VISIT
[FreeTextEntry3] : Presents with wife.  Overall, feels well. Breathing modestly more labored.  More difficult to keep up with wife when walking and climbing stairs.  ECHO reviewed: nL LVSF.  Consistent with severe AS (PLFLG).  Risk / benefit of TAVR discussed. Procedure scheduled.

## 2024-04-25 NOTE — HISTORY OF PRESENT ILLNESS
[FreeTextEntry1] : Mr. Coulter is a 79 y/o male with history of HTN, s/p Colonoscopy with EMR/ESD for Cecal polyp (HX pathology showed High grade dysplasia and Carcinoma in situ), CAD s/p CABGx2 at [facility] in 2004 and inferior STEMI 2018 at [facility] and PAD s/p Stent 2018. For symptoms of persistent SOB, fatigue, NYHA class II, he was evaluated outpatient by his cardiologist Dr. Jha. His most recent echo shows worsening of the aortic valve. Therefore, was referred to structural heart team for further evaluation of aortic stenosis.  He had most of his testing and arrives today with a most recent echo. No change in his symptoms.  NYHA class II Lives home alone with wife no aid

## 2024-04-25 NOTE — PHYSICAL EXAM
[Well Developed] : well developed [Well Nourished] : well nourished [No Acute Distress] : no acute distress [Normal Conjunctiva] : normal conjunctiva [Normal Venous Pressure] : normal venous pressure [No Carotid Bruit] : no carotid bruit [Normal S1, S2] : normal S1, S2 [Clear Lung Fields] : clear lung fields [Good Air Entry] : good air entry [No Respiratory Distress] : no respiratory distress  [Soft] : abdomen soft [Non Tender] : non-tender [No Masses/organomegaly] : no masses/organomegaly [Normal Bowel Sounds] : normal bowel sounds [Normal Gait] : normal gait [No Edema] : no edema [No Cyanosis] : no cyanosis [No Clubbing] : no clubbing [No Varicosities] : no varicosities [No Rash] : no rash [No Skin Lesions] : no skin lesions [Moves all extremities] : moves all extremities [No Focal Deficits] : no focal deficits [Normal Speech] : normal speech [Alert and Oriented] : alert and oriented [Normal memory] : normal memory [de-identified] : RUSB IV

## 2024-04-29 ENCOUNTER — APPOINTMENT (OUTPATIENT)
Dept: PULMONOLOGY | Facility: HOSPITAL | Age: 79
End: 2024-04-29
Payer: MEDICARE

## 2024-04-29 ENCOUNTER — OUTPATIENT (OUTPATIENT)
Dept: OUTPATIENT SERVICES | Facility: HOSPITAL | Age: 79
LOS: 1 days | End: 2024-04-29
Payer: MEDICARE

## 2024-04-29 DIAGNOSIS — Z98.62 PERIPHERAL VASCULAR ANGIOPLASTY STATUS: Chronic | ICD-10-CM

## 2024-04-29 DIAGNOSIS — Z95.1 PRESENCE OF AORTOCORONARY BYPASS GRAFT: Chronic | ICD-10-CM

## 2024-04-29 DIAGNOSIS — Z90.49 ACQUIRED ABSENCE OF OTHER SPECIFIED PARTS OF DIGESTIVE TRACT: Chronic | ICD-10-CM

## 2024-04-29 DIAGNOSIS — Z95.9 PRESENCE OF CARDIAC AND VASCULAR IMPLANT AND GRAFT, UNSPECIFIED: Chronic | ICD-10-CM

## 2024-04-29 DIAGNOSIS — I35.0 NONRHEUMATIC AORTIC (VALVE) STENOSIS: ICD-10-CM

## 2024-04-29 PROCEDURE — 94729 DIFFUSING CAPACITY: CPT | Mod: 26

## 2024-04-29 PROCEDURE — 94727 GAS DIL/WSHOT DETER LNG VOL: CPT | Mod: 26

## 2024-04-29 PROCEDURE — 94060 EVALUATION OF WHEEZING: CPT | Mod: 26

## 2024-04-29 PROCEDURE — 94727 GAS DIL/WSHOT DETER LNG VOL: CPT

## 2024-04-29 PROCEDURE — 94729 DIFFUSING CAPACITY: CPT

## 2024-04-29 PROCEDURE — 94070 EVALUATION OF WHEEZING: CPT

## 2024-04-29 PROCEDURE — 94664 DEMO&/EVAL PT USE INHALER: CPT

## 2024-04-30 DIAGNOSIS — I35.0 NONRHEUMATIC AORTIC (VALVE) STENOSIS: ICD-10-CM

## 2024-04-30 NOTE — ASU PATIENT PROFILE, ADULT - FALL HARM RISK - ATTEMPT OOB
"Verbal consent was acquired by the patient to use Ecomsual ambient listening note generation during this visit         History of Present Illness        Review of Systems   Gastrointestinal:  Negative for heartburn.        Dysphagia       Outpatient Medications Marked as Taking for the 4/30/24 encounter (Office Visit) with Viviana Whitfield M.D.   Medication Sig Dispense Refill   • atorvastatin (LIPITOR) 10 MG Tab TAKE 1 TABLET BY MOUTH ONCE DAILY IN THE MORNING 100 Tablet 3   • levetiracetam (KEPPRA) 500 MG TABLET SR 24 HR TAKE 4 TABLETS BY MOUTH EVERY DAY AT BEDTIME     • zonisamide (ZONEGRAN) 100 MG Cap TAKE 2 CAPSULES BY MOUTH NIGHTLY     • amLODIPine (NORVASC) 5 MG Tab Take 1 Tablet by mouth every day. 100 Tablet 3   • tadalafil (CIALIS) 5 MG tablet          /70 (BP Location: Right arm, Patient Position: Sitting, BP Cuff Size: Adult)   Pulse 71   Temp 37 °C (98.6 °F) (Temporal)   Ht 1.549 m (5' 1\")   Wt 54.7 kg (120 lb 9.6 oz)   SpO2 96% , Body mass index is 22.79 kg/m².        Physical Exam  Constitutional:       Appearance: Normal appearance.   Eyes:      Extraocular Movements: Extraocular movements intact.   Cardiovascular:      Rate and Rhythm: Normal rate and regular rhythm.   Pulmonary:      Effort: Pulmonary effort is normal.      Breath sounds: Normal breath sounds.   Neurological:      Mental Status: He is alert.   Psychiatric:         Mood and Affect: Mood normal.         Behavior: Behavior normal.       Results         Assessment & Plan    No orders of the defined types were placed in this encounter.            I spent a total of *** minutes which included time preparing to see the patient (reviewing my last note, records and recent lab results)  I also performed a medically appropriate examination, counseled and educated the patient, ordered medications and tests.  ***Also referred the patient to other healthcare professionals.  ***communication with other providers, and documentation of " this encounter.     This note was created using voice recognition software (Dragon). The accuracy of the dictation is limited by the abilities of the software. I have reviewed the note prior to signing, however some errors in grammar and context are still possible. If you have any questions related to this note please do not hesitate to contact our office.     to this note please do not hesitate to contact our office.     No

## 2024-05-07 ENCOUNTER — OUTPATIENT (OUTPATIENT)
Dept: OUTPATIENT SERVICES | Facility: HOSPITAL | Age: 79
LOS: 1 days | End: 2024-05-07
Payer: MEDICARE

## 2024-05-07 ENCOUNTER — RESULT REVIEW (OUTPATIENT)
Age: 79
End: 2024-05-07

## 2024-05-07 VITALS
HEIGHT: 68 IN | SYSTOLIC BLOOD PRESSURE: 110 MMHG | HEART RATE: 76 BPM | OXYGEN SATURATION: 98 % | WEIGHT: 164.91 LBS | TEMPERATURE: 97 F | DIASTOLIC BLOOD PRESSURE: 80 MMHG | RESPIRATION RATE: 20 BRPM

## 2024-05-07 DIAGNOSIS — I35.0 NONRHEUMATIC AORTIC (VALVE) STENOSIS: ICD-10-CM

## 2024-05-07 DIAGNOSIS — Z95.9 PRESENCE OF CARDIAC AND VASCULAR IMPLANT AND GRAFT, UNSPECIFIED: Chronic | ICD-10-CM

## 2024-05-07 DIAGNOSIS — Z95.1 PRESENCE OF AORTOCORONARY BYPASS GRAFT: Chronic | ICD-10-CM

## 2024-05-07 DIAGNOSIS — Z98.62 PERIPHERAL VASCULAR ANGIOPLASTY STATUS: Chronic | ICD-10-CM

## 2024-05-07 DIAGNOSIS — Z90.49 ACQUIRED ABSENCE OF OTHER SPECIFIED PARTS OF DIGESTIVE TRACT: Chronic | ICD-10-CM

## 2024-05-07 LAB
A1C WITH ESTIMATED AVERAGE GLUCOSE RESULT: 6.4 % — HIGH (ref 4–5.6)
ALBUMIN SERPL ELPH-MCNC: 4.3 G/DL — SIGNIFICANT CHANGE UP (ref 3.5–5.2)
ALP SERPL-CCNC: 90 U/L — SIGNIFICANT CHANGE UP (ref 30–115)
ALT FLD-CCNC: 16 U/L — SIGNIFICANT CHANGE UP (ref 0–41)
ANION GAP SERPL CALC-SCNC: 14 MMOL/L — SIGNIFICANT CHANGE UP (ref 7–14)
APPEARANCE UR: CLEAR — SIGNIFICANT CHANGE UP
APTT BLD: 29.3 SEC — SIGNIFICANT CHANGE UP (ref 27–39.2)
AST SERPL-CCNC: 21 U/L — SIGNIFICANT CHANGE UP (ref 0–41)
BASOPHILS # BLD AUTO: 0.02 K/UL — SIGNIFICANT CHANGE UP (ref 0–0.2)
BASOPHILS NFR BLD AUTO: 0.3 % — SIGNIFICANT CHANGE UP (ref 0–1)
BILIRUB SERPL-MCNC: 1 MG/DL — SIGNIFICANT CHANGE UP (ref 0.2–1.2)
BILIRUB UR-MCNC: NEGATIVE — SIGNIFICANT CHANGE UP
BLD GP AB SCN SERPL QL: SIGNIFICANT CHANGE UP
BUN SERPL-MCNC: 17 MG/DL — SIGNIFICANT CHANGE UP (ref 10–20)
CALCIUM SERPL-MCNC: 9.6 MG/DL — SIGNIFICANT CHANGE UP (ref 8.4–10.5)
CHLORIDE SERPL-SCNC: 104 MMOL/L — SIGNIFICANT CHANGE UP (ref 98–110)
CO2 SERPL-SCNC: 26 MMOL/L — SIGNIFICANT CHANGE UP (ref 17–32)
COLOR SPEC: YELLOW — SIGNIFICANT CHANGE UP
CREAT SERPL-MCNC: 1.1 MG/DL — SIGNIFICANT CHANGE UP (ref 0.7–1.5)
DIFF PNL FLD: NEGATIVE — SIGNIFICANT CHANGE UP
EGFR: 69 ML/MIN/1.73M2 — SIGNIFICANT CHANGE UP
EOSINOPHIL # BLD AUTO: 0.13 K/UL — SIGNIFICANT CHANGE UP (ref 0–0.7)
EOSINOPHIL NFR BLD AUTO: 2 % — SIGNIFICANT CHANGE UP (ref 0–8)
ESTIMATED AVERAGE GLUCOSE: 137 MG/DL — HIGH (ref 68–114)
GLUCOSE SERPL-MCNC: 107 MG/DL — HIGH (ref 70–99)
GLUCOSE UR QL: NEGATIVE MG/DL — SIGNIFICANT CHANGE UP
HCT VFR BLD CALC: 43.6 % — SIGNIFICANT CHANGE UP (ref 42–52)
HGB BLD-MCNC: 14.9 G/DL — SIGNIFICANT CHANGE UP (ref 14–18)
IMM GRANULOCYTES NFR BLD AUTO: 0.3 % — SIGNIFICANT CHANGE UP (ref 0.1–0.3)
INR BLD: 1.01 RATIO — SIGNIFICANT CHANGE UP (ref 0.65–1.3)
KETONES UR-MCNC: NEGATIVE MG/DL — SIGNIFICANT CHANGE UP
LEUKOCYTE ESTERASE UR-ACNC: NEGATIVE — SIGNIFICANT CHANGE UP
LYMPHOCYTES # BLD AUTO: 2.51 K/UL — SIGNIFICANT CHANGE UP (ref 1.2–3.4)
LYMPHOCYTES # BLD AUTO: 38.7 % — SIGNIFICANT CHANGE UP (ref 20.5–51.1)
MCHC RBC-ENTMCNC: 31.4 PG — HIGH (ref 27–31)
MCHC RBC-ENTMCNC: 34.2 G/DL — SIGNIFICANT CHANGE UP (ref 32–37)
MCV RBC AUTO: 91.8 FL — SIGNIFICANT CHANGE UP (ref 80–94)
MONOCYTES # BLD AUTO: 0.77 K/UL — HIGH (ref 0.1–0.6)
MONOCYTES NFR BLD AUTO: 11.9 % — HIGH (ref 1.7–9.3)
MRSA PCR RESULT.: NEGATIVE — SIGNIFICANT CHANGE UP
NEUTROPHILS # BLD AUTO: 3.03 K/UL — SIGNIFICANT CHANGE UP (ref 1.4–6.5)
NEUTROPHILS NFR BLD AUTO: 46.8 % — SIGNIFICANT CHANGE UP (ref 42.2–75.2)
NITRITE UR-MCNC: NEGATIVE — SIGNIFICANT CHANGE UP
NRBC # BLD: 0 /100 WBCS — SIGNIFICANT CHANGE UP (ref 0–0)
NT-PROBNP SERPL-SCNC: 370 PG/ML — HIGH (ref 0–300)
PH UR: 7.5 — SIGNIFICANT CHANGE UP (ref 5–8)
PLATELET # BLD AUTO: 120 K/UL — LOW (ref 130–400)
PMV BLD: 11.6 FL — HIGH (ref 7.4–10.4)
POTASSIUM SERPL-MCNC: 4.4 MMOL/L — SIGNIFICANT CHANGE UP (ref 3.5–5)
POTASSIUM SERPL-SCNC: 4.4 MMOL/L — SIGNIFICANT CHANGE UP (ref 3.5–5)
PROT SERPL-MCNC: 7.2 G/DL — SIGNIFICANT CHANGE UP (ref 6–8)
PROT UR-MCNC: SIGNIFICANT CHANGE UP MG/DL
PROTHROM AB SERPL-ACNC: 11.5 SEC — SIGNIFICANT CHANGE UP (ref 9.95–12.87)
RBC # BLD: 4.75 M/UL — SIGNIFICANT CHANGE UP (ref 4.7–6.1)
RBC # FLD: 13.1 % — SIGNIFICANT CHANGE UP (ref 11.5–14.5)
SODIUM SERPL-SCNC: 144 MMOL/L — SIGNIFICANT CHANGE UP (ref 135–146)
SP GR SPEC: 1.02 — SIGNIFICANT CHANGE UP (ref 1–1.03)
UROBILINOGEN FLD QL: 1 MG/DL — SIGNIFICANT CHANGE UP (ref 0.2–1)
WBC # BLD: 6.48 K/UL — SIGNIFICANT CHANGE UP (ref 4.8–10.8)
WBC # FLD AUTO: 6.48 K/UL — SIGNIFICANT CHANGE UP (ref 4.8–10.8)

## 2024-05-07 PROCEDURE — 85025 COMPLETE CBC W/AUTO DIFF WBC: CPT

## 2024-05-07 PROCEDURE — 83880 ASSAY OF NATRIURETIC PEPTIDE: CPT

## 2024-05-07 PROCEDURE — 93010 ELECTROCARDIOGRAM REPORT: CPT

## 2024-05-07 PROCEDURE — 71046 X-RAY EXAM CHEST 2 VIEWS: CPT

## 2024-05-07 PROCEDURE — 93005 ELECTROCARDIOGRAM TRACING: CPT

## 2024-05-07 PROCEDURE — 99214 OFFICE O/P EST MOD 30 MIN: CPT | Mod: 25

## 2024-05-07 PROCEDURE — 87086 URINE CULTURE/COLONY COUNT: CPT

## 2024-05-07 PROCEDURE — 85730 THROMBOPLASTIN TIME PARTIAL: CPT

## 2024-05-07 PROCEDURE — 36415 COLL VENOUS BLD VENIPUNCTURE: CPT

## 2024-05-07 PROCEDURE — 86850 RBC ANTIBODY SCREEN: CPT

## 2024-05-07 PROCEDURE — 87641 MR-STAPH DNA AMP PROBE: CPT

## 2024-05-07 PROCEDURE — 80053 COMPREHEN METABOLIC PANEL: CPT

## 2024-05-07 PROCEDURE — 71046 X-RAY EXAM CHEST 2 VIEWS: CPT | Mod: 26

## 2024-05-07 PROCEDURE — 83036 HEMOGLOBIN GLYCOSYLATED A1C: CPT

## 2024-05-07 PROCEDURE — 87640 STAPH A DNA AMP PROBE: CPT

## 2024-05-07 PROCEDURE — 85610 PROTHROMBIN TIME: CPT

## 2024-05-07 PROCEDURE — 81003 URINALYSIS AUTO W/O SCOPE: CPT

## 2024-05-07 PROCEDURE — 86901 BLOOD TYPING SEROLOGIC RH(D): CPT

## 2024-05-07 PROCEDURE — 86900 BLOOD TYPING SEROLOGIC ABO: CPT

## 2024-05-07 RX ORDER — RAMIPRIL 5 MG
1 CAPSULE ORAL
Qty: 0 | Refills: 0 | DISCHARGE

## 2024-05-07 NOTE — H&P PST ADULT - HISTORY OF PRESENT ILLNESS
80 Y/O MALE PT TO PAST WITH HX              PT NOW FOR SCHEDULED PROCEDURE. PT DENIES ANY CP SOB PALP COUGH DYSURIA FEVER URI. PT ABLE TO LUZMA 1-2 FOS W/O SOB  Anesthesia Alert  NO--Difficult Airway  NO--History of neck surgery or radiation  NO--Limited ROM of neck  NO--History of Malignant hyperthermia  NO--Personal or family history of Pseudocholinesterase deficiency.  NO--Prior Anesthesia Complication  NO--Latex Allergy  NO--Loose teeth  NO--History of Rheumatoid Arthritis  NO--CARLTON  NO--Bleeding risk  NO--Other_____   78 Y/O MALE PT TO PAST WITH   PT C/O INCREASED SOB, FATIGUE PAST 3-6 MO . PT NOW SCHEDULED FOR TAVR  PT DENIES ANY CP SOB PALP COUGH DYSURIA FEVER URI AT PRESENT   Anesthesia Alert  NO--Difficult Airway  NO--History of neck surgery or radiation  NO--Limited ROM of neck  NO--History of Malignant hyperthermia  NO--Personal or family history of Pseudocholinesterase deficiency.  NO--Prior Anesthesia Complication  NO--Latex Allergy  NO--Loose teeth  NO--History of Rheumatoid Arthritis  NO--CARLTON  NO--Bleeding risk  NO--Other_____  RCRI CLASS II  DASI 7 METS

## 2024-05-07 NOTE — H&P PST ADULT - NSICDXPASTMEDICALHX_GEN_ALL_CORE_FT
PAST MEDICAL HISTORY:  Aortic stenosis     Broken ribs 6 fall from ladder 2019/ 1 from fall in 2021    CAD (coronary artery disease)     Hypertension     Myocardial infarction      PAST MEDICAL HISTORY:  Aortic stenosis     Broken ribs 6 fall from ladder 2019/ 1 from fall in 2021    CAD (coronary artery disease)     Cancer, colon     Hypertension     Myocardial infarction

## 2024-05-08 DIAGNOSIS — I35.0 NONRHEUMATIC AORTIC (VALVE) STENOSIS: ICD-10-CM

## 2024-05-08 LAB
CULTURE RESULTS: SIGNIFICANT CHANGE UP
SPECIMEN SOURCE: SIGNIFICANT CHANGE UP

## 2024-05-14 VITALS
SYSTOLIC BLOOD PRESSURE: 134 MMHG | WEIGHT: 164.91 LBS | HEIGHT: 68 IN | HEART RATE: 77 BPM | OXYGEN SATURATION: 97 % | RESPIRATION RATE: 16 BRPM | TEMPERATURE: 97 F | DIASTOLIC BLOOD PRESSURE: 70 MMHG

## 2024-05-14 NOTE — PRE-ANESTHESIA EVALUATION ADULT - NSANTHTIREDRD_ENT_A_CORE
Quality 110: Preventive Care And Screening: Influenza Immunization: Influenza Immunization not Administered because Patient Refused. Detail Level: Detailed Quality 131: Pain Assessment And Follow-Up: Pain assessment using a standardized tool is documented as negative, no follow-up plan required Quality 130: Documentation Of Current Medications In The Medical Record: Current Medications Documented Quality 474: Zoster Vaccination Status: Shingrix Vaccination not Administered or Previously Received, Reason not Otherwise Specified No

## 2024-05-14 NOTE — PRE-ANESTHESIA EVALUATION ADULT - NSRADCARDRESULTSFT_GEN_ALL_CORE
CTA Chest: Severely calcified aortic valve annulus measuring 609 mm2    EKG: SR with 1st degree AV block    CXR No acute pulm process

## 2024-05-14 NOTE — PATIENT PROFILE ADULT - FALL HARM RISK - HARM RISK INTERVENTIONS

## 2024-05-14 NOTE — PATIENT PROFILE ADULT - DO YOU LACK THE NECESSARY SUPPORT TO HELP YOU COPE WITH LIFE CHALLENGES?
TB reading in 48-72 hours: Must be read after 10:19 AM on 8/29/22 or before 10:19 AM on 8/30/22.  Expect a call to schedule your appointment. Preferred appt location:  [place] Preferred appt time:  8/29/22 10:30 am no

## 2024-05-15 ENCOUNTER — RESULT REVIEW (OUTPATIENT)
Age: 79
End: 2024-05-15

## 2024-05-15 ENCOUNTER — INPATIENT (INPATIENT)
Facility: HOSPITAL | Age: 79
LOS: 0 days | Discharge: HOME HEALTH | DRG: 307 | End: 2024-05-16
Attending: THORACIC SURGERY (CARDIOTHORACIC VASCULAR SURGERY) | Admitting: THORACIC SURGERY (CARDIOTHORACIC VASCULAR SURGERY)
Payer: MEDICARE

## 2024-05-15 ENCOUNTER — APPOINTMENT (OUTPATIENT)
Dept: CARDIOTHORACIC SURGERY | Facility: HOSPITAL | Age: 79
End: 2024-05-15

## 2024-05-15 ENCOUNTER — TRANSCRIPTION ENCOUNTER (OUTPATIENT)
Age: 79
End: 2024-05-15

## 2024-05-15 DIAGNOSIS — I35.0 NONRHEUMATIC AORTIC (VALVE) STENOSIS: ICD-10-CM

## 2024-05-15 DIAGNOSIS — Z95.9 PRESENCE OF CARDIAC AND VASCULAR IMPLANT AND GRAFT, UNSPECIFIED: Chronic | ICD-10-CM

## 2024-05-15 DIAGNOSIS — Z95.1 PRESENCE OF AORTOCORONARY BYPASS GRAFT: Chronic | ICD-10-CM

## 2024-05-15 DIAGNOSIS — Z98.62 PERIPHERAL VASCULAR ANGIOPLASTY STATUS: Chronic | ICD-10-CM

## 2024-05-15 DIAGNOSIS — Z90.49 ACQUIRED ABSENCE OF OTHER SPECIFIED PARTS OF DIGESTIVE TRACT: Chronic | ICD-10-CM

## 2024-05-15 LAB
ALBUMIN SERPL ELPH-MCNC: 3.4 G/DL — LOW (ref 3.5–5.2)
ALP SERPL-CCNC: 77 U/L — SIGNIFICANT CHANGE UP (ref 30–115)
ALT FLD-CCNC: 20 U/L — SIGNIFICANT CHANGE UP (ref 0–41)
ANION GAP SERPL CALC-SCNC: 10 MMOL/L — SIGNIFICANT CHANGE UP (ref 7–14)
APTT BLD: 25.2 SEC — LOW (ref 27–39.2)
AST SERPL-CCNC: 28 U/L — SIGNIFICANT CHANGE UP (ref 0–41)
BASOPHILS # BLD AUTO: 0.02 K/UL — SIGNIFICANT CHANGE UP (ref 0–0.2)
BASOPHILS NFR BLD AUTO: 0.3 % — SIGNIFICANT CHANGE UP (ref 0–1)
BILIRUB SERPL-MCNC: 1 MG/DL — SIGNIFICANT CHANGE UP (ref 0.2–1.2)
BUN SERPL-MCNC: 16 MG/DL — SIGNIFICANT CHANGE UP (ref 10–20)
CALCIUM SERPL-MCNC: 8.1 MG/DL — LOW (ref 8.4–10.4)
CHLORIDE SERPL-SCNC: 106 MMOL/L — SIGNIFICANT CHANGE UP (ref 98–110)
CO2 SERPL-SCNC: 22 MMOL/L — SIGNIFICANT CHANGE UP (ref 17–32)
CREAT SERPL-MCNC: 1 MG/DL — SIGNIFICANT CHANGE UP (ref 0.7–1.5)
EGFR: 78 ML/MIN/1.73M2 — SIGNIFICANT CHANGE UP
EOSINOPHIL # BLD AUTO: 0.22 K/UL — SIGNIFICANT CHANGE UP (ref 0–0.7)
EOSINOPHIL NFR BLD AUTO: 3.1 % — SIGNIFICANT CHANGE UP (ref 0–8)
GLUCOSE BLDC GLUCOMTR-MCNC: 98 MG/DL — SIGNIFICANT CHANGE UP (ref 70–99)
GLUCOSE SERPL-MCNC: 107 MG/DL — HIGH (ref 70–99)
HCT VFR BLD CALC: 36.5 % — LOW (ref 42–52)
HGB BLD-MCNC: 12.3 G/DL — LOW (ref 14–18)
IMM GRANULOCYTES NFR BLD AUTO: 0.7 % — HIGH (ref 0.1–0.3)
INR BLD: 1.1 RATIO — SIGNIFICANT CHANGE UP (ref 0.65–1.3)
LYMPHOCYTES # BLD AUTO: 1.9 K/UL — SIGNIFICANT CHANGE UP (ref 1.2–3.4)
LYMPHOCYTES # BLD AUTO: 26.8 % — SIGNIFICANT CHANGE UP (ref 20.5–51.1)
MCHC RBC-ENTMCNC: 30.6 PG — SIGNIFICANT CHANGE UP (ref 27–31)
MCHC RBC-ENTMCNC: 33.7 G/DL — SIGNIFICANT CHANGE UP (ref 32–37)
MCV RBC AUTO: 90.8 FL — SIGNIFICANT CHANGE UP (ref 80–94)
MONOCYTES # BLD AUTO: 0.74 K/UL — HIGH (ref 0.1–0.6)
MONOCYTES NFR BLD AUTO: 10.4 % — HIGH (ref 1.7–9.3)
NEUTROPHILS # BLD AUTO: 4.16 K/UL — SIGNIFICANT CHANGE UP (ref 1.4–6.5)
NEUTROPHILS NFR BLD AUTO: 58.7 % — SIGNIFICANT CHANGE UP (ref 42.2–75.2)
NRBC # BLD: 0 /100 WBCS — SIGNIFICANT CHANGE UP (ref 0–0)
NT-PROBNP SERPL-SCNC: 266 PG/ML — SIGNIFICANT CHANGE UP (ref 0–300)
PLATELET # BLD AUTO: 103 K/UL — LOW (ref 130–400)
PMV BLD: 11 FL — HIGH (ref 7.4–10.4)
POTASSIUM SERPL-MCNC: 4.4 MMOL/L — SIGNIFICANT CHANGE UP (ref 3.5–5)
POTASSIUM SERPL-SCNC: 4.4 MMOL/L — SIGNIFICANT CHANGE UP (ref 3.5–5)
PROT SERPL-MCNC: 5.9 G/DL — LOW (ref 6–8)
PROTHROM AB SERPL-ACNC: 12.5 SEC — SIGNIFICANT CHANGE UP (ref 9.95–12.87)
RBC # BLD: 4.02 M/UL — LOW (ref 4.7–6.1)
RBC # FLD: 13.2 % — SIGNIFICANT CHANGE UP (ref 11.5–14.5)
SODIUM SERPL-SCNC: 138 MMOL/L — SIGNIFICANT CHANGE UP (ref 135–146)
WBC # BLD: 7.09 K/UL — SIGNIFICANT CHANGE UP (ref 4.8–10.8)
WBC # FLD AUTO: 7.09 K/UL — SIGNIFICANT CHANGE UP (ref 4.8–10.8)

## 2024-05-15 PROCEDURE — C1894: CPT

## 2024-05-15 PROCEDURE — 93005 ELECTROCARDIOGRAM TRACING: CPT

## 2024-05-15 PROCEDURE — 82330 ASSAY OF CALCIUM: CPT

## 2024-05-15 PROCEDURE — C1887: CPT

## 2024-05-15 PROCEDURE — 33361 REPLACE AORTIC VALVE PERQ: CPT | Mod: 62,Q0

## 2024-05-15 PROCEDURE — 84132 ASSAY OF SERUM POTASSIUM: CPT

## 2024-05-15 PROCEDURE — 71045 X-RAY EXAM CHEST 1 VIEW: CPT | Mod: 26

## 2024-05-15 PROCEDURE — C1889: CPT

## 2024-05-15 PROCEDURE — 93306 TTE W/DOPPLER COMPLETE: CPT | Mod: 26

## 2024-05-15 PROCEDURE — 86923 COMPATIBILITY TEST ELECTRIC: CPT

## 2024-05-15 PROCEDURE — 36415 COLL VENOUS BLD VENIPUNCTURE: CPT

## 2024-05-15 PROCEDURE — 80053 COMPREHEN METABOLIC PANEL: CPT

## 2024-05-15 PROCEDURE — 85025 COMPLETE CBC W/AUTO DIFF WBC: CPT

## 2024-05-15 PROCEDURE — 84295 ASSAY OF SERUM SODIUM: CPT

## 2024-05-15 PROCEDURE — 85610 PROTHROMBIN TIME: CPT

## 2024-05-15 PROCEDURE — L8699: CPT

## 2024-05-15 PROCEDURE — 94010 BREATHING CAPACITY TEST: CPT

## 2024-05-15 PROCEDURE — 83880 ASSAY OF NATRIURETIC PEPTIDE: CPT

## 2024-05-15 PROCEDURE — 93306 TTE W/DOPPLER COMPLETE: CPT

## 2024-05-15 PROCEDURE — 97161 PT EVAL LOW COMPLEX 20 MIN: CPT | Mod: GP

## 2024-05-15 PROCEDURE — 85018 HEMOGLOBIN: CPT

## 2024-05-15 PROCEDURE — 94640 AIRWAY INHALATION TREATMENT: CPT

## 2024-05-15 PROCEDURE — 85014 HEMATOCRIT: CPT

## 2024-05-15 PROCEDURE — 71045 X-RAY EXAM CHEST 1 VIEW: CPT

## 2024-05-15 PROCEDURE — C1769: CPT

## 2024-05-15 PROCEDURE — C1760: CPT

## 2024-05-15 PROCEDURE — 83605 ASSAY OF LACTIC ACID: CPT

## 2024-05-15 PROCEDURE — 85730 THROMBOPLASTIN TIME PARTIAL: CPT

## 2024-05-15 PROCEDURE — 82803 BLOOD GASES ANY COMBINATION: CPT

## 2024-05-15 PROCEDURE — 82962 GLUCOSE BLOOD TEST: CPT

## 2024-05-15 PROCEDURE — 83735 ASSAY OF MAGNESIUM: CPT

## 2024-05-15 RX ORDER — CEFAZOLIN SODIUM 1 G
1000 VIAL (EA) INJECTION EVERY 8 HOURS
Refills: 0 | Status: COMPLETED | OUTPATIENT
Start: 2024-05-15 | End: 2024-05-16

## 2024-05-15 RX ORDER — ASPIRIN/CALCIUM CARB/MAGNESIUM 324 MG
300 TABLET ORAL ONCE
Refills: 0 | Status: DISCONTINUED | OUTPATIENT
Start: 2024-05-15 | End: 2024-05-16

## 2024-05-15 RX ORDER — ONDANSETRON 8 MG/1
4 TABLET, FILM COATED ORAL ONCE
Refills: 0 | Status: COMPLETED | OUTPATIENT
Start: 2024-05-15 | End: 2024-05-15

## 2024-05-15 RX ORDER — NOREPINEPHRINE BITARTRATE/D5W 8 MG/250ML
0.05 PLASTIC BAG, INJECTION (ML) INTRAVENOUS
Qty: 8 | Refills: 0 | Status: DISCONTINUED | OUTPATIENT
Start: 2024-05-15 | End: 2024-05-16

## 2024-05-15 RX ORDER — OXYCODONE HYDROCHLORIDE 5 MG/1
5 TABLET ORAL EVERY 6 HOURS
Refills: 0 | Status: DISCONTINUED | OUTPATIENT
Start: 2024-05-15 | End: 2024-05-16

## 2024-05-15 RX ORDER — ASPIRIN/CALCIUM CARB/MAGNESIUM 324 MG
81 TABLET ORAL DAILY
Refills: 0 | Status: DISCONTINUED | OUTPATIENT
Start: 2024-05-16 | End: 2024-05-16

## 2024-05-15 RX ORDER — LOSARTAN POTASSIUM 100 MG/1
1 TABLET, FILM COATED ORAL
Refills: 0 | DISCHARGE

## 2024-05-15 RX ORDER — NICARDIPINE HYDROCHLORIDE 30 MG/1
5 CAPSULE, EXTENDED RELEASE ORAL
Qty: 40 | Refills: 0 | Status: DISCONTINUED | OUTPATIENT
Start: 2024-05-15 | End: 2024-05-16

## 2024-05-15 RX ORDER — SODIUM CHLORIDE 9 MG/ML
1000 INJECTION INTRAMUSCULAR; INTRAVENOUS; SUBCUTANEOUS
Refills: 0 | Status: DISCONTINUED | OUTPATIENT
Start: 2024-05-15 | End: 2024-05-16

## 2024-05-15 RX ORDER — IPRATROPIUM BROMIDE 0.2 MG/ML
2 SOLUTION, NON-ORAL INHALATION EVERY 6 HOURS
Refills: 0 | Status: DISCONTINUED | OUTPATIENT
Start: 2024-05-15 | End: 2024-05-16

## 2024-05-15 RX ORDER — DEXMEDETOMIDINE HYDROCHLORIDE IN 0.9% SODIUM CHLORIDE 4 UG/ML
0.25 INJECTION INTRAVENOUS
Qty: 200 | Refills: 0 | Status: DISCONTINUED | OUTPATIENT
Start: 2024-05-15 | End: 2024-05-16

## 2024-05-15 RX ORDER — METOPROLOL TARTRATE 50 MG
1 TABLET ORAL
Qty: 0 | Refills: 0 | DISCHARGE

## 2024-05-15 RX ORDER — ROSUVASTATIN CALCIUM 5 MG/1
1 TABLET ORAL
Qty: 0 | Refills: 0 | DISCHARGE

## 2024-05-15 RX ORDER — CLOPIDOGREL BISULFATE 75 MG/1
1 TABLET, FILM COATED ORAL
Qty: 0 | Refills: 0 | DISCHARGE

## 2024-05-15 RX ORDER — PANTOPRAZOLE SODIUM 20 MG/1
40 TABLET, DELAYED RELEASE ORAL DAILY
Refills: 0 | Status: DISCONTINUED | OUTPATIENT
Start: 2024-05-16 | End: 2024-05-16

## 2024-05-15 RX ORDER — ATORVASTATIN CALCIUM 80 MG/1
40 TABLET, FILM COATED ORAL AT BEDTIME
Refills: 0 | Status: DISCONTINUED | OUTPATIENT
Start: 2024-05-16 | End: 2024-05-16

## 2024-05-15 RX ORDER — POLYETHYLENE GLYCOL 3350 17 G/17G
17 POWDER, FOR SOLUTION ORAL DAILY
Refills: 0 | Status: DISCONTINUED | OUTPATIENT
Start: 2024-05-15 | End: 2024-05-16

## 2024-05-15 RX ORDER — LOSARTAN POTASSIUM 100 MG/1
50 TABLET, FILM COATED ORAL DAILY
Refills: 0 | Status: DISCONTINUED | OUTPATIENT
Start: 2024-05-16 | End: 2024-05-16

## 2024-05-15 RX ORDER — INSULIN HUMAN 100 [IU]/ML
1 INJECTION, SOLUTION SUBCUTANEOUS
Qty: 100 | Refills: 0 | Status: DISCONTINUED | OUTPATIENT
Start: 2024-05-15 | End: 2024-05-16

## 2024-05-15 RX ORDER — ACETAMINOPHEN 500 MG
1000 TABLET ORAL ONCE
Refills: 0 | Status: COMPLETED | OUTPATIENT
Start: 2024-05-15 | End: 2024-05-15

## 2024-05-15 RX ORDER — ACETAMINOPHEN 500 MG
650 TABLET ORAL EVERY 6 HOURS
Refills: 0 | Status: DISCONTINUED | OUTPATIENT
Start: 2024-05-15 | End: 2024-05-16

## 2024-05-15 RX ORDER — ASPIRIN/CALCIUM CARB/MAGNESIUM 324 MG
1 TABLET ORAL
Qty: 0 | Refills: 0 | DISCHARGE

## 2024-05-15 RX ORDER — CHLORHEXIDINE GLUCONATE 213 G/1000ML
1 SOLUTION TOPICAL DAILY
Refills: 0 | Status: DISCONTINUED | OUTPATIENT
Start: 2024-05-16 | End: 2024-05-16

## 2024-05-15 RX ORDER — NITROGLYCERIN 6.5 MG
5 CAPSULE, EXTENDED RELEASE ORAL
Qty: 50 | Refills: 0 | Status: DISCONTINUED | OUTPATIENT
Start: 2024-05-15 | End: 2024-05-16

## 2024-05-15 RX ORDER — CLOPIDOGREL BISULFATE 75 MG/1
75 TABLET, FILM COATED ORAL DAILY
Refills: 0 | Status: DISCONTINUED | OUTPATIENT
Start: 2024-05-16 | End: 2024-05-16

## 2024-05-15 RX ORDER — DEXTROSE 50 % IN WATER 50 %
50 SYRINGE (ML) INTRAVENOUS
Refills: 0 | Status: DISCONTINUED | OUTPATIENT
Start: 2024-05-15 | End: 2024-05-16

## 2024-05-15 RX ADMIN — Medication 1000 MILLIGRAM(S): at 18:40

## 2024-05-15 RX ADMIN — Medication 2 PUFF(S): at 21:12

## 2024-05-15 RX ADMIN — Medication 100 MILLIGRAM(S): at 21:12

## 2024-05-15 RX ADMIN — ONDANSETRON 4 MILLIGRAM(S): 8 TABLET, FILM COATED ORAL at 18:40

## 2024-05-15 RX ADMIN — Medication 400 MILLIGRAM(S): at 18:16

## 2024-05-15 NOTE — DISCHARGE NOTE PROVIDER - CARE PROVIDERS DIRECT ADDRESSES
,DirectAddress_Unknown,[email or web address]

## 2024-05-15 NOTE — BRIEF OPERATIVE NOTE - NSICDXBRIEFPROCEDURE_GEN_ALL_CORE_FT
PROCEDURES:  TAVR, percutaneous femoral approach, using prosthetic valve 15-May-2024 15:49:29 utilizng 29MM Model [id number] [place] bovine pericardial tissue transcatheter heart valve; serial # [id number] Mauricio Baker

## 2024-05-15 NOTE — DISCHARGE NOTE PROVIDER - HOSPITAL COURSE
80 y/o male with PMHx: HTN, HLD, PAD s/p stent 2018, 2023, CAD h/o CABG 2004 ([facility]) / inferior STEMI 2018, AS, PAD with stent RLE, chronic back pain, + smoker presented to cardiologists office with c/o continued SOB and fatigue. Recent LHC on 5/2023 significant for LM and RCA disease. ECHO 4/26: EF 56% , Moderate-to-severe aortic stenosis (Vmax 3.05m/s, mean PG 20.1mmHg, DI 0.25, PINKY 0.83). The valve appears heavily calcified and severely stenotic on short axis imaging. Patient medically optimized for elective TAVR that was performed 5/15/23. His post op course was     80 y/o male with PMHx: HTN, HLD, PAD s/p stent 2018, 2023, CAD h/o CABG 2004 ([facility]) / inferior STEMI 2018, PCI of RCA,  known AS, PAD with stent RLE, chronic back pain, + smoker presented to cardiologists office with c/o continued SOB and fatigue. Recent LHC on 5/2023 significant for LM and RCA disease. ECHO 4/26: EF 56% , Moderate-to-severe aortic stenosis (Vmax 3.05m/s, mean PG 20.1mmHg, DI 0.25, PINKY 0.83). The valve appears heavily calcified and severely stenotic on short axis imaging. Patient medically optimized for elective TAVR that was performed 5/15/23. His post op course was     78 y/o male with PMHx: HTN, HLD, PAD s/p stent 2018, 2023, CAD h/o CABG 2004 ([facility]) / inferior STEMI 2018, PCI of RCA,  known AS, PAD with stent RLE, chronic back pain, + smoker presented to cardiologists office with c/o continued SOB and fatigue. Recent LHC on 5/2023 significant for LM and RCA disease. ECHO 4/26: EF 56% , Moderate-to-severe aortic stenosis (Vmax 3.05m/s, mean PG 20.1mmHg, DI 0.25, PINKY 0.83). The valve appears heavily calcified and severely stenotic on short axis imaging. Patient medically optimized for elective TAVR that was performed 5/15/23. His post op course was uneventful and the patient was then discharged home in stable condition on POD #1.    Pt was educated on the importance of attending cardiac rehab post op and was given materials re cardiac rehab program.  She and her daughter were instructed to inquire further upon seeing her cardiologist.

## 2024-05-15 NOTE — DISCHARGE NOTE PROVIDER - NSDCMRMEDTOKEN_GEN_ALL_CORE_FT
aspirin 81 mg oral tablet: 1 tab(s) orally once a day  clopidogrel 75 mg oral tablet: 1 tab(s) orally once a day  Crestor 40 mg oral tablet: 1 tab(s) orally once a day  losartan 50 mg oral tablet: 1 tab(s) orally once a day  metoprolol succinate 25 mg oral tablet, extended release: 1 tab(s) orally once a day   acetaminophen 325 mg oral tablet: 2 tab(s) orally every 6 hours As needed Temp greater or equal to 38C (100.4F), Mild Pain (1 - 3)  aspirin 81 mg oral tablet: 1 tab(s) orally once a day  clopidogrel 75 mg oral tablet: 1 tab(s) orally once a day  Crestor 40 mg oral tablet: 1 tab(s) orally once a day  losartan 50 mg oral tablet: 1 tab(s) orally once a day  metoprolol succinate 25 mg oral tablet, extended release: 1 tab(s) orally once a day

## 2024-05-15 NOTE — DISCHARGE NOTE PROVIDER - NSDCFUADDAPPT_GEN_ALL_CORE_FT
please follow up with structural heart 5/23 at 12:20 pm    please follow up with cardiology in 2 weeks and with pmd in 4 weeks    please follow up with dr greenberg 6/27 at 12:30 pm

## 2024-05-15 NOTE — DISCHARGE NOTE PROVIDER - CARE PROVIDER_API CALL
[facility],   [address], [address]  [place], [place] [address]  Phone: [phone]  Fax: (   )    -  Follow Up Time:     Christo Jha  Cardiovascular Disease  [address] [place] [address], [address]  [place] [address]  Phone: [phone]  Fax: [phone]  Follow Up Time:     Maurice Taylor  Internal Medicine  [address] [place] [address]  Phone: [phone]  Fax: [phone]  Follow Up Time:

## 2024-05-15 NOTE — BRIEF OPERATIVE NOTE - OPERATION/FINDINGS
as per dictation as per dictation  Tolerated well  No paravalvular leak  Significant peripheral artery disease

## 2024-05-15 NOTE — DISCHARGE NOTE PROVIDER - NSDCFUSCHEDAPPT_GEN_ALL_CORE_FT
[facility] Physician [facility]  CARDIOLOGY [address] [place] [address]  Scheduled Appointment: 08/07/2024     Jason Broussard  [facility]  CARDIOLOGY [address]  Scheduled Appointment: 05/23/2024    Tala Naidu  [facility]  ELECTROPH [address]  Scheduled Appointment: 06/27/2024    [facility]  CARDIOLOGY [address]  Scheduled Appointment: 08/07/2024

## 2024-05-15 NOTE — DISCHARGE NOTE PROVIDER - PROVIDER TOKENS
FREE:[LAST:[[facility]],PHONE:[phone]],FAX:[phone]   )    -],ADDRESS:[[address] [place] [address]]],PROVIDER:[TOKEN:[[id number]]],PROVIDER:[TOKEN:[[id number]]]

## 2024-05-16 ENCOUNTER — RESULT REVIEW (OUTPATIENT)
Age: 79
End: 2024-05-16

## 2024-05-16 ENCOUNTER — TRANSCRIPTION ENCOUNTER (OUTPATIENT)
Age: 79
End: 2024-05-16

## 2024-05-16 VITALS
RESPIRATION RATE: 19 BRPM | HEART RATE: 98 BPM | DIASTOLIC BLOOD PRESSURE: 64 MMHG | OXYGEN SATURATION: 91 % | SYSTOLIC BLOOD PRESSURE: 116 MMHG

## 2024-05-16 LAB
ALBUMIN SERPL ELPH-MCNC: 3.6 G/DL — SIGNIFICANT CHANGE UP (ref 3.5–5.2)
ALP SERPL-CCNC: 86 U/L — SIGNIFICANT CHANGE UP (ref 30–115)
ALT FLD-CCNC: 20 U/L — SIGNIFICANT CHANGE UP (ref 0–41)
ANION GAP SERPL CALC-SCNC: 10 MMOL/L — SIGNIFICANT CHANGE UP (ref 7–14)
AST SERPL-CCNC: 27 U/L — SIGNIFICANT CHANGE UP (ref 0–41)
BASOPHILS # BLD AUTO: 0.02 K/UL — SIGNIFICANT CHANGE UP (ref 0–0.2)
BASOPHILS NFR BLD AUTO: 0.2 % — SIGNIFICANT CHANGE UP (ref 0–1)
BILIRUB SERPL-MCNC: 1.7 MG/DL — HIGH (ref 0.2–1.2)
BUN SERPL-MCNC: 20 MG/DL — SIGNIFICANT CHANGE UP (ref 10–20)
CALCIUM SERPL-MCNC: 8.4 MG/DL — SIGNIFICANT CHANGE UP (ref 8.4–10.4)
CHLORIDE SERPL-SCNC: 103 MMOL/L — SIGNIFICANT CHANGE UP (ref 98–110)
CO2 SERPL-SCNC: 24 MMOL/L — SIGNIFICANT CHANGE UP (ref 17–32)
CREAT SERPL-MCNC: 0.9 MG/DL — SIGNIFICANT CHANGE UP (ref 0.7–1.5)
EGFR: 88 ML/MIN/1.73M2 — SIGNIFICANT CHANGE UP
EOSINOPHIL # BLD AUTO: 0.08 K/UL — SIGNIFICANT CHANGE UP (ref 0–0.7)
EOSINOPHIL NFR BLD AUTO: 0.8 % — SIGNIFICANT CHANGE UP (ref 0–8)
GAS PNL BLDA: SIGNIFICANT CHANGE UP
GLUCOSE SERPL-MCNC: 101 MG/DL — HIGH (ref 70–99)
HCT VFR BLD CALC: 36.1 % — LOW (ref 42–52)
HGB BLD-MCNC: 12.7 G/DL — LOW (ref 14–18)
IMM GRANULOCYTES NFR BLD AUTO: 0.3 % — SIGNIFICANT CHANGE UP (ref 0.1–0.3)
LYMPHOCYTES # BLD AUTO: 1.54 K/UL — SIGNIFICANT CHANGE UP (ref 1.2–3.4)
LYMPHOCYTES # BLD AUTO: 15.5 % — LOW (ref 20.5–51.1)
MAGNESIUM SERPL-MCNC: 1.8 MG/DL — SIGNIFICANT CHANGE UP (ref 1.8–2.4)
MCHC RBC-ENTMCNC: 31.5 PG — HIGH (ref 27–31)
MCHC RBC-ENTMCNC: 35.2 G/DL — SIGNIFICANT CHANGE UP (ref 32–37)
MCV RBC AUTO: 89.6 FL — SIGNIFICANT CHANGE UP (ref 80–94)
MONOCYTES # BLD AUTO: 0.99 K/UL — HIGH (ref 0.1–0.6)
MONOCYTES NFR BLD AUTO: 10 % — HIGH (ref 1.7–9.3)
NEUTROPHILS # BLD AUTO: 7.25 K/UL — HIGH (ref 1.4–6.5)
NEUTROPHILS NFR BLD AUTO: 73.2 % — SIGNIFICANT CHANGE UP (ref 42.2–75.2)
NRBC # BLD: 0 /100 WBCS — SIGNIFICANT CHANGE UP (ref 0–0)
PLATELET # BLD AUTO: 97 K/UL — LOW (ref 130–400)
PMV BLD: 10.9 FL — HIGH (ref 7.4–10.4)
POTASSIUM SERPL-MCNC: 4 MMOL/L — SIGNIFICANT CHANGE UP (ref 3.5–5)
POTASSIUM SERPL-SCNC: 4 MMOL/L — SIGNIFICANT CHANGE UP (ref 3.5–5)
PROT SERPL-MCNC: 6.3 G/DL — SIGNIFICANT CHANGE UP (ref 6–8)
RBC # BLD: 4.03 M/UL — LOW (ref 4.7–6.1)
RBC # FLD: 13 % — SIGNIFICANT CHANGE UP (ref 11.5–14.5)
SODIUM SERPL-SCNC: 137 MMOL/L — SIGNIFICANT CHANGE UP (ref 135–146)
WBC # BLD: 9.91 K/UL — SIGNIFICANT CHANGE UP (ref 4.8–10.8)
WBC # FLD AUTO: 9.91 K/UL — SIGNIFICANT CHANGE UP (ref 4.8–10.8)

## 2024-05-16 PROCEDURE — 99232 SBSQ HOSP IP/OBS MODERATE 35: CPT

## 2024-05-16 PROCEDURE — 93306 TTE W/DOPPLER COMPLETE: CPT | Mod: 26

## 2024-05-16 PROCEDURE — 93010 ELECTROCARDIOGRAM REPORT: CPT

## 2024-05-16 PROCEDURE — 71045 X-RAY EXAM CHEST 1 VIEW: CPT | Mod: 26

## 2024-05-16 RX ORDER — ACETAMINOPHEN 500 MG
2 TABLET ORAL
Qty: 0 | Refills: 0 | DISCHARGE
Start: 2024-05-16

## 2024-05-16 RX ORDER — MAGNESIUM SULFATE 500 MG/ML
1 VIAL (ML) INJECTION ONCE
Refills: 0 | Status: COMPLETED | OUTPATIENT
Start: 2024-05-16 | End: 2024-05-16

## 2024-05-16 RX ADMIN — Medication 100 GRAM(S): at 07:03

## 2024-05-16 RX ADMIN — Medication 2 PUFF(S): at 14:21

## 2024-05-16 RX ADMIN — Medication 30 MILLILITER(S): at 06:16

## 2024-05-16 RX ADMIN — Medication 81 MILLIGRAM(S): at 12:57

## 2024-05-16 RX ADMIN — PANTOPRAZOLE SODIUM 40 MILLIGRAM(S): 20 TABLET, DELAYED RELEASE ORAL at 12:57

## 2024-05-16 RX ADMIN — POLYETHYLENE GLYCOL 3350 17 GRAM(S): 17 POWDER, FOR SOLUTION ORAL at 12:57

## 2024-05-16 RX ADMIN — CLOPIDOGREL BISULFATE 75 MILLIGRAM(S): 75 TABLET, FILM COATED ORAL at 12:57

## 2024-05-16 RX ADMIN — Medication 100 MILLIGRAM(S): at 06:14

## 2024-05-16 RX ADMIN — LOSARTAN POTASSIUM 50 MILLIGRAM(S): 100 TABLET, FILM COATED ORAL at 06:14

## 2024-05-16 NOTE — PROGRESS NOTE ADULT - ASSESSMENT
Patient seen and examined on morning rounds as described above    Postop day 1  Status post transcatheter aortic valve replacement    Doing very well  Hemodynamically stable  Neurologically intact  Groins are clean and dry without significant hematomas    Cardiac rhythm is stable and  has not required any temporary pacing    Overall the patient is making excellent progress    The patient, the family and the care team updated regarding the plan and progress

## 2024-05-16 NOTE — DISCHARGE NOTE NURSING/CASE MANAGEMENT/SOCIAL WORK - PATIENT PORTAL LINK FT
You can access the FollowMyHealth Patient Portal offered by [facility] by registering at the following website: [email or web address]. By joining [facility]’s FollowMyHealth portal, you will also be able to view your health information using other applications (apps) compatible with our system.

## 2024-05-16 NOTE — PROGRESS NOTE ADULT - SUBJECTIVE AND OBJECTIVE BOX
OPERATIVE PROCEDURE(s): tavr                POD # 1    SURGEON(s): nichole/romel    SUBJECTIVE ASSESSMENT: pt is without complaints    Vital Signs Last 24 Hrs  T(C): 36.7 (16 May 2024 04:00), Max: 36.7 (16 May 2024 04:00)  T(F): 98 (16 May 2024 04:00), Max: 98 (16 May 2024 04:00)  HR: 98 (16 May 2024 11:00) (56 - 103)  BP: 116/64 (16 May 2024 11:00) (101/62 - 138/81)  BP(mean): 84 (16 May 2024 11:00) (73 - 106)  RR: 19 (16 May 2024 11:00) (12 - 34)  SpO2: 91% (16 May 2024 11:00) (91% - 99%)    Parameters below as of 16 May 2024 12:00  Patient On (Oxygen Delivery Method): room air      05-15-24 @ 07:01  -  05-16-24 @ 07:00  --------------------------------------------------------  IN: 645 mL / OUT: 650 mL / NET: -5 mL    Physical Exam  General: alert and oriented x 3  Chest: cta bl  CVS: s1s2  Abd: pos bs soft nt  GI/ :  Ext: no sig edema    LABS:                        12.7   9.91  )-----------( 97       ( 16 May 2024 04:40 )             36.1     COUMADIN:   [ ] YES [x ] NO    PT/INR - ( 15 May 2024 16:24 )   PT: 12.50 sec;   INR: 1.10 ratio         PTT - ( 15 May 2024 16:24 )  PTT:25.2 sec  05-16    137  |  103  |  20  ----------------------------<  101<H>  4.0   |  24  |  0.9    Ca    8.4      16 May 2024 04:40  Mg     1.8     05-16    TPro  6.3  /  Alb  3.6  /  TBili  1.7<H>  /  DBili  x   /  AST  27  /  ALT  20  /  AlkPhos  86  05-16    Urinalysis Basic - ( 16 May 2024 04:40 )    Color: x / Appearance: x / SG: x / pH: x  Gluc: 101 mg/dL / Ketone: x  / Bili: x / Urobili: x   Blood: x / Protein: x / Nitrite: x   Leuk Esterase: x / RBC: x / WBC x   Sq Epi: x / Non Sq Epi: x / Bacteria: x    MEDICATIONS  (STANDING):  aspirin enteric coated 81 milliGRAM(s) Oral daily  aspirin Suppository 300 milliGRAM(s) Rectal once  atorvastatin 40 milliGRAM(s) Oral at bedtime  ceFAZolin   IVPB 1000 milliGRAM(s) IV Intermittent every 8 hours  chlorhexidine 2% Cloths 1 Application(s) Topical daily  clopidogrel Tablet 75 milliGRAM(s) Oral daily  dextrose 50% Injectable 50 milliLiter(s) IV Push every 15 minutes  ipratropium 17 MICROgram(s) HFA Inhaler 2 Puff(s) Inhalation every 6 hours  losartan 50 milliGRAM(s) Oral daily  pantoprazole    Tablet 40 milliGRAM(s) Oral daily  polyethylene glycol 3350 17 Gram(s) Oral daily    MEDICATIONS  (PRN):  acetaminophen     Tablet .. 650 milliGRAM(s) Oral every 6 hours PRN Temp greater or equal to 38C (100.4F), Mild Pain (1 - 3)  oxyCODONE    IR 5 milliGRAM(s) Oral every 6 hours PRN Moderate Pain (4 - 6)      Allergies    No Known Allergies    Intolerances    Ambulation/Activity Status:  awaiting to ambulate with pt      RADIOLOGY & ADDITIONAL TESTS:    ACC: [id number] EXAM:  XR CHEST PORTABLE ROUTINE 1V   ORDERED BY: KAYLEE GALLO     PROCEDURE DATE:  05/16/2024      INTERPRETATION:  Clinical History / Reason for exam: Dyspnea    Comparison : Chest radiograph 4/15/2024.    Technique/Positioning: Frontal.    Findings:    Support devices: None.    Cardiac/mediastinum/hilum: Unremarkable.    Lung parenchyma/Pleura: Within normal limits.    Skeleton/soft tissues: Unremarkable.    Impression:    No radiographic evidence of acute cardiopulmonary disease.    --- End of Report ---    ZAYRA GARZA MD; Attending Radiologist  This document has been electronically signed. May 16 2024  1:07PM    Assessment/Plan: Patient is a 80 yo male s/p tavr pod # 1  cont present tx as per [facility]  s/p tavr- cont asa and plavix  htn- resume losartan and toprol  dvt / gi ppx  d/c home today with mcot  removed femoral tvp and sheath and pt tolerated procedure well    Social Service Disposition:   home with mcot and vns   OPERATIVE PROCEDURE(s): tavr                POD # 1    SURGEON(s): nichole/romel    SUBJECTIVE ASSESSMENT: pt is without complaints    Vital Signs Last 24 Hrs  T(C): 36.7 (16 May 2024 04:00), Max: 36.7 (16 May 2024 04:00)  T(F): 98 (16 May 2024 04:00), Max: 98 (16 May 2024 04:00)  HR: 98 (16 May 2024 11:00) (56 - 103)  BP: 116/64 (16 May 2024 11:00) (101/62 - 138/81)  BP(mean): 84 (16 May 2024 11:00) (73 - 106)  RR: 19 (16 May 2024 11:00) (12 - 34)  SpO2: 91% (16 May 2024 11:00) (91% - 99%)    Parameters below as of 16 May 2024 12:00  Patient On (Oxygen Delivery Method): room air      05-15-24 @ 07:01  -  05-16-24 @ 07:00  --------------------------------------------------------  IN: 645 mL / OUT: 650 mL / NET: -5 mL    Physical Exam  General: alert and oriented x 3  Chest: cta bl  CVS: s1s2  Abd: pos bs soft nt  GI/ :  Ext: no sig edema    LABS:                        12.7   9.91  )-----------( 97       ( 16 May 2024 04:40 )             36.1     COUMADIN:   [ ] YES [x ] NO    PT/INR - ( 15 May 2024 16:24 )   PT: 12.50 sec;   INR: 1.10 ratio         PTT - ( 15 May 2024 16:24 )  PTT:25.2 sec  05-16    137  |  103  |  20  ----------------------------<  101<H>  4.0   |  24  |  0.9    Ca    8.4      16 May 2024 04:40  Mg     1.8     05-16    TPro  6.3  /  Alb  3.6  /  TBili  1.7<H>  /  DBili  x   /  AST  27  /  ALT  20  /  AlkPhos  86  05-16    Urinalysis Basic - ( 16 May 2024 04:40 )    Color: x / Appearance: x / SG: x / pH: x  Gluc: 101 mg/dL / Ketone: x  / Bili: x / Urobili: x   Blood: x / Protein: x / Nitrite: x   Leuk Esterase: x / RBC: x / WBC x   Sq Epi: x / Non Sq Epi: x / Bacteria: x    MEDICATIONS  (STANDING):  aspirin enteric coated 81 milliGRAM(s) Oral daily  aspirin Suppository 300 milliGRAM(s) Rectal once  atorvastatin 40 milliGRAM(s) Oral at bedtime  ceFAZolin   IVPB 1000 milliGRAM(s) IV Intermittent every 8 hours  chlorhexidine 2% Cloths 1 Application(s) Topical daily  clopidogrel Tablet 75 milliGRAM(s) Oral daily  dextrose 50% Injectable 50 milliLiter(s) IV Push every 15 minutes  ipratropium 17 MICROgram(s) HFA Inhaler 2 Puff(s) Inhalation every 6 hours  losartan 50 milliGRAM(s) Oral daily  pantoprazole    Tablet 40 milliGRAM(s) Oral daily  polyethylene glycol 3350 17 Gram(s) Oral daily    MEDICATIONS  (PRN):  acetaminophen     Tablet .. 650 milliGRAM(s) Oral every 6 hours PRN Temp greater or equal to 38C (100.4F), Mild Pain (1 - 3)  oxyCODONE    IR 5 milliGRAM(s) Oral every 6 hours PRN Moderate Pain (4 - 6)      Allergies    No Known Allergies    Intolerances    Ambulation/Activity Status:  awaiting to ambulate with pt      RADIOLOGY & ADDITIONAL TESTS:    ACC: [id number] EXAM:  XR CHEST PORTABLE ROUTINE 1V   ORDERED BY: KAYLEE GALLO     PROCEDURE DATE:  05/16/2024      INTERPRETATION:  Clinical History / Reason for exam: Dyspnea    Comparison : Chest radiograph 4/15/2024.    Technique/Positioning: Frontal.    Findings:    Support devices: None.    Cardiac/mediastinum/hilum: Unremarkable.    Lung parenchyma/Pleura: Within normal limits.    Skeleton/soft tissues: Unremarkable.    Impression:    No radiographic evidence of acute cardiopulmonary disease.    --- End of Report ---    ZAYRA GARZA MD; Attending Radiologist  This document has been electronically signed. May 16 2024  1:07PM    ACC: [id number] EXAM:  ECHO TTE WO CON COMP W DOPP                          PROCEDURE DATE:  05/15/2024          INTERPRETATION:   [place] [address]                Phone: [phone].   TRANSTHORACIC ECHOCARDIOGRAM REPORT    Patient Name:   JEAN PAUL Accession #: [id number]  Medical Rec #:  [id number]      Height:      68.1 in 173.0 cm  YOB: 1945     Weight:      163.2 lb 74.01 kg  Patient Age:    79 years     BSA:         1.88 m²  Patient Gender: M            BP:          137/72 mmHg      Date of Exam:        5/15/2024 1:11:24 PM  Referring Physician: Jamarcus Hernandez  Sonographer:         Mary Hunter  Reading Physician:   Rafy Encarnacion MD.    Procedure:   2D Echo/Doppler/Color Doppler Complete.  Indications: R01.1 - Cardiac Murmur, unspecified  Diagnosis:   R01.1 - Cardiac murmur, unspecified    Summary:   1. Mildly decreased global left ventricular systolic function.   2. LV Ejection Fraction by Mejia's Method with a biplane EF of 51 %.   3. Mildly increased LV wall thickness.   4. The left ventricular diastolic function could not be assessed in this   study.   5. Normal left atrial size.   6. Normal right atrial size.   7. Mild mitral annular calcification.   8. Mild mitral valve regurgitation.   9. Mild thickening of the anterior and posterior mitral valve leaflets.  10. Severe aortic valve stenosis. Peak/mean PG 46/25 mmHg, PINKY 0.8 cm^2.  11. Mild aortic regurgitation.  12. S/p 29 mm [place] valve implantation in the aortic position.   Appropriately positioned, well expanded valve. No evidence of valvular or   paravalvular regurgitation. Mean transvalvular pressure gradient is 3   mmHg. Refer to next day echo for full hemodynamic assessment of the   valve. There is no change in LV systolic function. There is no change in   MR severity. No change in TR severity. No new pericardial effusion.   Images reviewed by the interventional team during the procedure.    PHYSICIAN INTERPRETATION:  Left Ventricle: The left ventricular internal cavity size is normal. Left   ventricular wall thickness is mildly increased. Global LV systolic   function was mildly decreased. Normal segmental left ventricular systolic   function. The left ventricular diastolic function could not be assessed   in this study.  Right Ventricle: Normal right ventricular size and function.  Left Atrium: Normal left atrial size.  Right Atrium: Normal right atrial size.  Pericardium: There is no evidence of pericardial effusion.  Mitral Valve: Mild thickening of the anterior and posterior mitral valve   leaflets. There is mild mitral annular calcification. Mild mitral valve   regurgitation is seen.  Tricuspid Valve: The tricuspid valve is normal in structure. Trivial   tricuspid regurgitation is visualized.  Aortic Valve: Severe aortic stenosis is present. Mild aortic valve   regurgitation is seen.  Pulmonic Valve: Structurally normal pulmonic valve, with normal leaflet   excursion. No indication of pulmonic valve regurgitation.  Aorta: The aortic root and ascending aorta are structurally normal, with   no evidence of dilitation.  Pulmonary Artery: The pulmonary artery is of normal size and origin.  Venous: The inferior vena cava was dilated, with respiratory size   variation greater than 50%.    2D AND M-MODE MEASUREMENTS (normal ranges within parentheses):  Left Ventricle:                  Normal  IVSd (2D):              1.20 cm (0.7-1.1)  LVPWd (2D):   1.10 cm (0.7-1.1)  LVIDd (2D):             5.10 cm (3.4-5.7)  LVIDs (2D):             3.80 cm  LV FS (2D):             25.5 %   (>25%)  Relative Wall Thickness  0.43    (<0.42)    SPECTRAL DOPPLER ANALYSIS:  LV DIASTOLIC FUNCTION:  MV Peak E: 0.70m/s Decel Time: 182 msec  MV Peak A: 0.70 m/s  E/A Ratio: 1.00    Aortic Valve:  AoV VMax:    3.38 m/s  AoV Area, Vmax:    0.81 cm² Vmax Indx:    0.43   cm²/m²  AoV VTI:     0.71 m    AoV Area, VTI:     0.82 cm² VTI Indx:     0.44   cm²/m²  AoV Pk Grad: 45.7 mmHg AoV Area, Mn Grad: 0.77 cm² Mn Grad Indx: 0.41   cm²/m²  AoV Mn Grad: 25.0 mmHg    LVOT Vmax: 0.79 m/s  LVOT VTI:  0.17 m  LVOT Diam: 2.10 cm    Mitral Valve:  MV P1/2 Time: 52.78 msec  MV Area, PHT: 4.17 cm²      [id number] Rafy Encarnacion MD, Electronically signed on 5/16/2024 at   12:17:55 PM    *** Final ***    Ventricular Rate 94 BPM    Atrial Rate 94 BPM    P-R Interval 200 ms    QRS Duration 94 ms    Q-T Interval 350 ms    QTC Calculation(Bazett) 437 ms    P Axis 30 degrees    R Axis -20 degrees    T Axis 101 degrees    Diagnosis Line Normal sinus rhythm  Incomplete right bundle branch block  Voltage criteria for left ventricular hypertrophy  T wave abnormality, consider lateral ischemia  Abnormal ECG    Confirmed by Kevin Lowe ([id number]) on 5/16/2024 8:29:09 AM    Assessment/Plan: Patient is a 78 yo male s/p tavr pod # 1  cont present tx [facility] per [facility]  s/p tavr- cont asa and plavix  htn- resume losartan and toprol  dvt / gi ppx  d/c home today with mcot  removed femoral tvp and sheath and pt tolerated procedure well    Social Service Disposition:   home with mcot and vns

## 2024-05-16 NOTE — DISCHARGE NOTE NURSING/CASE MANAGEMENT/SOCIAL WORK - NSDCPEFALRISK_GEN_ALL_CORE
For information on Fall & Injury Prevention, visit: [email or web address] OR  [email or web address] OR  https://www.cdc.gov/steadi/patient.html

## 2024-05-16 NOTE — PHYSICAL THERAPY INITIAL EVALUATION ADULT - GENERAL OBSERVATIONS, REHAB EVAL
Pt is s/p TAVR, remains with TVP at groin site. Pt remains on temporary bedrest, PT to f/u once pt is cleared for OOB with PT.
1401 - 1420 Pt rec in b/s recliner with +tele, in NAD.

## 2024-05-16 NOTE — PHYSICAL THERAPY INITIAL EVALUATION ADULT - REHAB POTENTIAL, PT EVAL
Pt presents independent with functional mobility without AD, does not require skilled b/s PT intervention at this time. Pt can be d/c from b/s PT./none

## 2024-05-17 ENCOUNTER — APPOINTMENT (OUTPATIENT)
Dept: CARE COORDINATION | Facility: HOME HEALTH | Age: 79
End: 2024-05-17

## 2024-05-17 PROBLEM — C18.9 MALIGNANT NEOPLASM OF COLON, UNSPECIFIED: Chronic | Status: ACTIVE | Noted: 2024-05-07

## 2024-05-17 RX ORDER — RAMIPRIL 10 MG/1
10 CAPSULE ORAL
Qty: 90 | Refills: 3 | Status: DISCONTINUED | COMMUNITY
Start: 2022-01-04 | End: 2024-05-17

## 2024-05-20 VITALS
DIASTOLIC BLOOD PRESSURE: 66 MMHG | RESPIRATION RATE: 12 BRPM | OXYGEN SATURATION: 97 % | HEART RATE: 67 BPM | SYSTOLIC BLOOD PRESSURE: 108 MMHG

## 2024-05-20 NOTE — HISTORY OF PRESENT ILLNESS
[FreeTextEntry1] : Hospital Course: Discharge Date	16-May-2024 Admission Date	15-May-2024 09:05 Reason for Admission	aortic stenosis Hospital Course	 80 y/o male with PMHx: HTN, HLD, PAD s/p stent 2018, 2023, CAD h/o CABG 2004 (LIMA- DIAG/LAD) / inferior STEMI 2018, PCI of RCA,  known AS, PAD with stent RLE, chronic back pain, + smoker presented to cardiologists office with c/o continued SOB and fatigue. Recent LHC on 5/2023 significant for LM and RCA disease. ECHO 4/26: EF 56% , Moderate-to-severe aortic stenosis (Vmax 3.05m/s, mean PG 20.1mmHg, DI 0.25, PINKY 0.83). The valve appears heavily calcified and severely stenotic on short axis imaging. Patient medically optimized for elective TAVR that was performed 5/15/23. His post op course was uneventful and the patient was then discharged home in stable condition on POD #1.  _______________________________________________________________ Today patient was seen for IHA s/p discharge from  [facility]  . Patient informed they are being followed by [facility] program. Time was spent with the patient reviewing the discharge material including medications, follow up appointments, recovery, concerning symptoms, and how to contact me should they have questions.

## 2024-05-20 NOTE — ASSESSMENT
[FreeTextEntry1] : Pt recovering well at home s/p  TAVR                . Reviewed all medications and dosages with pt understanding. Pt dispenses meds appropriately into med dispenser each week. Pt has all medications in home and is taking as prescribed. Denies pain at this time. No new symptoms, issues or concerns to report at this time. Appt schedule reviewed with pt verbalizing understanding.

## 2024-05-20 NOTE — PLAN
[TextEntry] : Pt advised of importance of daily weights. Pt instructed on how to use incentive spirometer every hour, demonstrated proper use. Pt encouraged to ambulate as much as tolerated, avoiding extreme temperatures outdoors. Also advised to cleanse incisions daily with mild soap and water and to avoid lotions, powders, ointments or creams near or on the incision. Low salt, low fat diet encouraged and discussed. Pt advised to avoid heavy lifting or straining.     [facility] team will continue to follow up with pt status.  NP/CCC roles explained with pt understanding, contact information provided. Pt agrees to call with any questions, issues or concerns.  Worsening symptoms reviewed with patient understanding.      FOLLOW UP APPOINTMENTS:  CTS:1 week  CARDIOLOGIST:2 weeks   PCP: Pt encouraged to follow up within one month of discharge

## 2024-05-20 NOTE — PHYSICAL EXAM
[Neck Appearance] : the appearance of the neck was normal [Neck Cervical Mass (___cm)] : no neck mass was observed [Jugular Venous Distention Increased] : there was no jugular-venous distention [Auscultation Breath Sounds / Voice Sounds] : lungs were clear to auscultation bilaterally [Heart Rate And Rhythm] : heart rate was normal and rhythm regular [Heart Sounds] : normal S1 and S2 [Heart Sounds Gallop] : no gallops [Murmurs] : no murmurs [Heart Sounds Pericardial Friction Rub] : no pericardial rub [Examination Of The Chest] : the chest was normal in appearance [Chest Visual Inspection Thoracic Asymmetry] : no chest asymmetry [Diminished Respiratory Excursion] : normal chest expansion [FreeTextEntry1] : mcot in place [2+] : left 2+ [FreeTextEntry2] : Skin clean, dry and healing well. The groin incision had no active bleeding, no foul smell, no purulent drainage and no serosanguineous drainage. [Abnormal Walk] : normal gait [Nail Clubbing] : no clubbing  or cyanosis of the fingernails [Musculoskeletal - Swelling] : no joint swelling seen [Motor Tone] : muscle strength and tone were normal [Skin Color & Pigmentation] : normal skin color and pigmentation [Skin Turgor] : normal skin turgor [] : no rash [Deep Tendon Reflexes (DTR)] : deep tendon reflexes were 2+ and symmetric [Sensation] : the sensory exam was normal to light touch and pinprick [No Focal Deficits] : no focal deficits [Oriented To Time, Place, And Person] : oriented to person, place, and time [Impaired Insight] : insight and judgment were intact [Affect] : the affect was normal

## 2024-05-23 ENCOUNTER — APPOINTMENT (OUTPATIENT)
Dept: CARDIOLOGY | Facility: CLINIC | Age: 79
End: 2024-05-23
Payer: MEDICARE

## 2024-05-23 VITALS
DIASTOLIC BLOOD PRESSURE: 74 MMHG | SYSTOLIC BLOOD PRESSURE: 106 MMHG | RESPIRATION RATE: 11 BRPM | OXYGEN SATURATION: 97 % | WEIGHT: 162 LBS | BODY MASS INDEX: 24.55 KG/M2 | HEART RATE: 92 BPM | HEIGHT: 68 IN | TEMPERATURE: 97 F

## 2024-05-23 DIAGNOSIS — I25.10 ATHEROSCLEROTIC HEART DISEASE OF NATIVE CORONARY ARTERY W/OUT ANGINA PECTORIS: ICD-10-CM

## 2024-05-23 PROCEDURE — 99213 OFFICE O/P EST LOW 20 MIN: CPT

## 2024-05-24 DIAGNOSIS — E78.00 PURE HYPERCHOLESTEROLEMIA, UNSPECIFIED: ICD-10-CM

## 2024-05-24 DIAGNOSIS — Z79.899 OTHER LONG TERM (CURRENT) DRUG THERAPY: ICD-10-CM

## 2024-05-24 DIAGNOSIS — G89.29 OTHER CHRONIC PAIN: ICD-10-CM

## 2024-05-24 DIAGNOSIS — I35.0 NONRHEUMATIC AORTIC (VALVE) STENOSIS: ICD-10-CM

## 2024-05-24 DIAGNOSIS — Z95.820 PERIPHERAL VASCULAR ANGIOPLASTY STATUS WITH IMPLANTS AND GRAFTS: ICD-10-CM

## 2024-05-24 DIAGNOSIS — I50.30 UNSPECIFIED DIASTOLIC (CONGESTIVE) HEART FAILURE: ICD-10-CM

## 2024-05-24 DIAGNOSIS — I11.0 HYPERTENSIVE HEART DISEASE WITH HEART FAILURE: ICD-10-CM

## 2024-05-24 DIAGNOSIS — I73.9 PERIPHERAL VASCULAR DISEASE, UNSPECIFIED: ICD-10-CM

## 2024-05-24 DIAGNOSIS — I44.0 ATRIOVENTRICULAR BLOCK, FIRST DEGREE: ICD-10-CM

## 2024-05-24 DIAGNOSIS — I25.2 OLD MYOCARDIAL INFARCTION: ICD-10-CM

## 2024-05-24 DIAGNOSIS — I25.10 ATHEROSCLEROTIC HEART DISEASE OF NATIVE CORONARY ARTERY WITHOUT ANGINA PECTORIS: ICD-10-CM

## 2024-05-24 DIAGNOSIS — Z79.82 LONG TERM (CURRENT) USE OF ASPIRIN: ICD-10-CM

## 2024-05-24 DIAGNOSIS — Z95.5 PRESENCE OF CORONARY ANGIOPLASTY IMPLANT AND GRAFT: ICD-10-CM

## 2024-05-24 DIAGNOSIS — Z85.038 PERSONAL HISTORY OF OTHER MALIGNANT NEOPLASM OF LARGE INTESTINE: ICD-10-CM

## 2024-05-24 DIAGNOSIS — Z95.1 PRESENCE OF AORTOCORONARY BYPASS GRAFT: ICD-10-CM

## 2024-05-24 DIAGNOSIS — I34.81 NONRHEUMATIC MITRAL (VALVE) ANNULUS CALCIFICATION: ICD-10-CM

## 2024-05-24 DIAGNOSIS — F17.210 NICOTINE DEPENDENCE, CIGARETTES, UNCOMPLICATED: ICD-10-CM

## 2024-05-24 DIAGNOSIS — Z79.02 LONG TERM (CURRENT) USE OF ANTITHROMBOTICS/ANTIPLATELETS: ICD-10-CM

## 2024-05-24 DIAGNOSIS — M54.9 DORSALGIA, UNSPECIFIED: ICD-10-CM

## 2024-05-24 DIAGNOSIS — I70.0 ATHEROSCLEROSIS OF AORTA: ICD-10-CM

## 2024-05-29 DIAGNOSIS — Z00.6 ENCOUNTER FOR EXAMINATION FOR NORMAL COMPARISON AND CONTROL IN CLINICAL RESEARCH PROGRAM: ICD-10-CM

## 2024-06-02 ENCOUNTER — TRANSCRIPTION ENCOUNTER (OUTPATIENT)
Age: 79
End: 2024-06-02

## 2024-06-05 ENCOUNTER — TRANSCRIPTION ENCOUNTER (OUTPATIENT)
Age: 79
End: 2024-06-05

## 2024-06-06 ENCOUNTER — TRANSCRIPTION ENCOUNTER (OUTPATIENT)
Age: 79
End: 2024-06-06

## 2024-06-06 PROBLEM — I25.10 CAD (CORONARY ARTERY DISEASE): Status: ACTIVE | Noted: 2023-05-14

## 2024-06-06 NOTE — PHYSICAL EXAM
[Well Developed] : well developed [Well Nourished] : well nourished [No Acute Distress] : no acute distress [Normal Conjunctiva] : normal conjunctiva [Normal Venous Pressure] : normal venous pressure [No Carotid Bruit] : no carotid bruit [Normal S1, S2] : normal S1, S2 [Clear Lung Fields] : clear lung fields [Good Air Entry] : good air entry [No Respiratory Distress] : no respiratory distress  [Soft] : abdomen soft [Non Tender] : non-tender [No Masses/organomegaly] : no masses/organomegaly [Normal Bowel Sounds] : normal bowel sounds [Normal Gait] : normal gait [No Edema] : no edema [No Cyanosis] : no cyanosis [No Clubbing] : no clubbing [No Varicosities] : no varicosities [No Rash] : no rash [No Skin Lesions] : no skin lesions [Moves all extremities] : moves all extremities [No Focal Deficits] : no focal deficits [Normal Speech] : normal speech [Alert and Oriented] : alert and oriented [Normal memory] : normal memory [de-identified] : RUSB IV

## 2024-06-06 NOTE — ASSESSMENT
[FreeTextEntry1] : Plan: Doing well overall since discharge, no complaints Groin is soft and healing 1 Month Echocardiogram and Labs/EKG F/U after for review F/U with EP For MCOT review Continue F/U with Cardiologist F/U with PMD for routine medical care

## 2024-06-06 NOTE — HISTORY OF PRESENT ILLNESS
[FreeTextEntry1] : 78 y/o male with PMHx: HTN, HLD, PAD s/p stent 2018, 2023, CAD h/o CABG 2004 (LIMA- DIAG/LAD) / inferior STEMI 2018, PCI of RCA,  known AS, PAD with stent RLE, chronic back pain, + smoker presented to cardiologists office with c/o continued SOB and fatigue. Recent LHC on 5/2023 significant for LM and RCA disease. ECHO 4/26: EF 56% , Moderate-to-severe aortic stenosis (Vmax 3.05m/s, mean PG 20.1mmHg, DI 0.25, PINKY 0.83). The valve appears heavily calcified and severely stenotic on short axis imaging. Patient medically optimized for elective TAVR that was performed 5/15/23. His post op course was uneventful and the patient was then discharged home in stable condition on POD #1.  Here for first F/U after TAVR.

## 2024-06-06 NOTE — END OF VISIT
[FreeTextEntry3] : Doing well post TAVR. Breathing comfortable. BP controlled. Compensated. Continue current cardiac Rx. Follow-up 1-month post procedure.

## 2024-06-07 ENCOUNTER — APPOINTMENT (OUTPATIENT)
Dept: CARDIOLOGY | Facility: CLINIC | Age: 79
End: 2024-06-07
Payer: MEDICARE

## 2024-06-07 VITALS
HEART RATE: 91 BPM | DIASTOLIC BLOOD PRESSURE: 60 MMHG | BODY MASS INDEX: 24.86 KG/M2 | HEIGHT: 68 IN | WEIGHT: 164 LBS | SYSTOLIC BLOOD PRESSURE: 100 MMHG

## 2024-06-07 DIAGNOSIS — I35.0 NONRHEUMATIC AORTIC (VALVE) STENOSIS: ICD-10-CM

## 2024-06-07 DIAGNOSIS — I71.40 ABDOMINAL AORTIC ANEURYSM, WITHOUT RUPTURE, UNSPECIFIED: ICD-10-CM

## 2024-06-07 PROCEDURE — 99214 OFFICE O/P EST MOD 30 MIN: CPT | Mod: 25

## 2024-06-07 PROCEDURE — 93000 ELECTROCARDIOGRAM COMPLETE: CPT

## 2024-06-07 NOTE — HISTORY OF PRESENT ILLNESS
[FreeTextEntry1] : Mr. Coulter is a 79-year-old man with history of CAD s/p CABG x2 in [facility] 2004 and PCI for inferior STEMI 2018, moderate-to-severe aortic stenosis s/p TAVR 5/2024 presenting for post-TAVR follow up.  Patient previously followed with Dr. Bedolla and was last seen by him in clinic on 4/27/22.  On our first visit he noted he was becoming more fatigued/short of breath when he goes for a walk. TTE demonstrated stable moderate-severe AS. I referred him to the structural team and he has since undergone [facility] demonstrating multivessel CAD as below. CT TAVR was noted to have appropriate TAVR anatomy and he subsequently underwent PCI of the RCI 8/1/23 and TAVR on 5/2024.  Today feels well, notes no significant improvement in dyspnea since TAVR but overall dyspnea is mild. Has occasional right hand tingling/numbness when he doesn't use his hand, not with activity.  [facility] 8/1/2023 - MARISOL to mRCA and dRCA  AAA 2023 - Infrarenal mild dilatation, 2.6x2.9cm aneurysm  Carotid Doppler 9/2022:  Bilateral <50% stenosis with protuberant plaque. Carotid Doppler 10/2023: mild bilateral ICA stenoses  TTE 4/2023 - EF 56%, unchanged RWMAs, mod-severe AS (Vmax 3.05m/s, mean PG 20.1mmHg, DI 0.25, PINKY 0.83), mild AI TTE 4/2022 - EF 50%, PB hypokinesis, moderate MR, mild AR, moderate AS (Vmax 3.0 m/s, mean PG 15 mmHg)  [facility] 5/2023 - Multivessel CAD (LM + RCA) - Patent LIMA [id number]/Diag - mRCA focal in-stent restenosis and dRCA stenosis - LCx not visualized 2/2 LM occlusion - Low CO an dLVSV - Mod-severe AS (paradoxical LF/LG) (PINKY 1.0cm2) - Rec: TAVR eval, consider PCI of RCA  [facility] 2018 at [facility] - 100% LM, LAD, LCx - Patent LIMA-diag/LAD - RCA occluded in the mid-portion, s/p PCI with MARISOL - Distal second PL is occluded, likely thrombus.  AAA US 5/2023 - Infrarenal aorta with mild dilatation (2.6 x 2.9cm)  Labs: - , TG 60, HDL 36, LDL 61, non-HDL 73 -> LDL 66, HDL 34, ,  - Cr 1.0, K 4.2 -> Cr 0.9, K 4.3, normla transaminases - A1c 6.2%, pBNP 381 -> 342, hsCRP 1.07

## 2024-06-07 NOTE — DISCUSSION/SUMMARY
[EKG obtained to assist in diagnosis and management of assessed problem(s)] : EKG obtained to assist in diagnosis and management of assessed problem(s) Yes

## 2024-06-07 NOTE — PHYSICAL EXAM
[Well Developed] : well developed [Well Nourished] : well nourished [No Acute Distress] : no acute distress [Normal Conjunctiva] : normal conjunctiva [Normal Venous Pressure] : normal venous pressure [No Carotid Bruit] : no carotid bruit [Normal S1, S2] : normal S1, S2 [No Murmur] : no murmur [No Rub] : no rub [No Gallop] : no gallop [Good Air Entry] : good air entry [No Respiratory Distress] : no respiratory distress  [Soft] : abdomen soft [Non Tender] : non-tender [No Masses/organomegaly] : no masses/organomegaly [Normal Bowel Sounds] : normal bowel sounds [Normal Gait] : normal gait [No Edema] : no edema [No Cyanosis] : no cyanosis [No Clubbing] : no clubbing [No Varicosities] : no varicosities [No Rash] : no rash [No Skin Lesions] : no skin lesions [Moves all extremities] : moves all extremities [No Focal Deficits] : no focal deficits [Normal Speech] : normal speech [Alert and Oriented] : alert and oriented [Normal memory] : normal memory [de-identified] : Faint crackles at the right base

## 2024-06-07 NOTE — ASSESSMENT
[FreeTextEntry1] : Mr. Coulter is a 78-year-old man with history of CAD (s/p CABG 2004, PCI for inferior STEMI 2018, elective PCI f the RCA 2023) and moderate-to-severe AS, presenting for follow up in management of AS and CAD.  Impression: (1) CAD s/p CABG at [facility] 2004 and inferior STEMI 2018 at [facility], recent elective PCI of the RCA; stable, no angina, LDL 88 (2) Aortic stenosis, LFLG, moderate to severe, progressing over the past year with PINKY in the severe range (Vmax 3.0, mPG 20, AV 0.83) (3) HTN (4) HLD (5) Mild AAA, infrarenal measuring 2.6x2.9cm, due for imaging 2024 (6) Carotid artery disease with protuberant plaque  Plan: - Post-TAVR TTE scheduled for later this month - 30 Day MCOT to finish next week - Repeat US AAA and carotid doppler end of this year - Continue DAPT - Continue metoprolol as an antianginal - Continue high intensity statin - Continue losartan 50mg  RTC 2-3 months, sooner as needed

## 2024-06-09 ENCOUNTER — TRANSCRIPTION ENCOUNTER (OUTPATIENT)
Age: 79
End: 2024-06-09

## 2024-06-13 ENCOUNTER — TRANSCRIPTION ENCOUNTER (OUTPATIENT)
Age: 79
End: 2024-06-13

## 2024-06-15 ENCOUNTER — TRANSCRIPTION ENCOUNTER (OUTPATIENT)
Age: 79
End: 2024-06-15

## 2024-06-17 ENCOUNTER — TRANSCRIPTION ENCOUNTER (OUTPATIENT)
Age: 79
End: 2024-06-17

## 2024-06-26 ENCOUNTER — APPOINTMENT (OUTPATIENT)
Dept: CARDIOTHORACIC SURGERY | Facility: CLINIC | Age: 79
End: 2024-06-26
Payer: MEDICARE

## 2024-06-26 ENCOUNTER — APPOINTMENT (OUTPATIENT)
Dept: CARDIOLOGY | Facility: CLINIC | Age: 79
End: 2024-06-26

## 2024-06-26 VITALS
OXYGEN SATURATION: 95 % | SYSTOLIC BLOOD PRESSURE: 135 MMHG | DIASTOLIC BLOOD PRESSURE: 86 MMHG | WEIGHT: 158 LBS | HEIGHT: 68 IN | HEART RATE: 82 BPM | BODY MASS INDEX: 23.95 KG/M2

## 2024-06-26 VITALS
DIASTOLIC BLOOD PRESSURE: 86 MMHG | HEART RATE: 82 BPM | WEIGHT: 158 LBS | TEMPERATURE: 97 F | HEIGHT: 68 IN | RESPIRATION RATE: 11 BRPM | SYSTOLIC BLOOD PRESSURE: 135 MMHG | OXYGEN SATURATION: 95 % | BODY MASS INDEX: 23.95 KG/M2

## 2024-06-26 DIAGNOSIS — I10 ESSENTIAL (PRIMARY) HYPERTENSION: ICD-10-CM

## 2024-06-26 DIAGNOSIS — Z95.2 PRESENCE OF PROSTHETIC HEART VALVE: ICD-10-CM

## 2024-06-26 PROCEDURE — 99213 OFFICE O/P EST LOW 20 MIN: CPT

## 2024-06-26 PROCEDURE — 93306 TTE W/DOPPLER COMPLETE: CPT

## 2024-06-27 ENCOUNTER — APPOINTMENT (OUTPATIENT)
Dept: ELECTROPHYSIOLOGY | Facility: CLINIC | Age: 79
End: 2024-06-27

## 2024-06-27 VITALS
DIASTOLIC BLOOD PRESSURE: 70 MMHG | TEMPERATURE: 97.1 F | HEART RATE: 83 BPM | SYSTOLIC BLOOD PRESSURE: 108 MMHG | WEIGHT: 164 LBS | BODY MASS INDEX: 24.86 KG/M2 | HEIGHT: 68 IN

## 2024-06-27 DIAGNOSIS — Z80.9 FAMILY HISTORY OF MALIGNANT NEOPLASM, UNSPECIFIED: ICD-10-CM

## 2024-06-27 DIAGNOSIS — Z78.9 OTHER SPECIFIED HEALTH STATUS: ICD-10-CM

## 2024-06-27 DIAGNOSIS — Z86.79 PERSONAL HISTORY OF OTHER DISEASES OF THE CIRCULATORY SYSTEM: ICD-10-CM

## 2024-06-27 DIAGNOSIS — Z82.3 FAMILY HISTORY OF STROKE: ICD-10-CM

## 2024-06-27 DIAGNOSIS — Z86.018 PERSONAL HISTORY OF OTHER BENIGN NEOPLASM: ICD-10-CM

## 2024-06-27 PROCEDURE — 99214 OFFICE O/P EST MOD 30 MIN: CPT | Mod: 25

## 2024-06-27 PROCEDURE — 93000 ELECTROCARDIOGRAM COMPLETE: CPT | Mod: 59

## 2024-08-06 NOTE — DISCHARGE NOTE NURSING/CASE MANAGEMENT/SOCIAL WORK - NSSCCONTNUM_GEN_ALL_CORE
ADVANCED CARE AT HOME ~ RN CASE MANAGER NOTE    Patient seen today for No chief complaint on file.      Focus of care for today's visit includes the following topics: Medication Management , Disease process education, and Urinary complications     ALLERGIES:   Allergen Reactions    Shellfish Allergy   (Food Or Med) SWELLING    Shellfish-Derived Products   (Food Or Med) Other (See Comments) and SWELLING    Iodine   (Environmental Or Med) Other (See Comments) and HIVES       Visit Vitals: Encounter Vitals  BP: 130/70  Heart Rate: 70  Resp: 18  Temp: 96.8 °F (36 °C)  Temp src: Skin  SpO2: 97 %  Pain Score:  0  Pain Edu?: Yes  Pain number during visit: 0, or None  Pain:  Patient has no complaints of pain or discomfort    Reported pain location: -  What time is pain the worst:  -  What makes pain level better: heat      Safety Concerns: fall risk       Frailty & Advanced Illness: Frailty Scale  Have you felt fatigued? Most or all of the time over the past month?: Yes  Do you have difficulty climbing a flight of stairs?: Yes  Do you have difficulty walking one block?: Yes  Do you have any of these illnesses: hypertension, diabetes, cancer (other than a minor skin cancer), chronic lung disease, heart attack, congestive heart failure, angina, asthma, arthritis, stroke, and kidney disease?: Yes  Have you lost more than 5 percent of your weight in the past year?: No  A score of 3 or more is considered frail: 4  Advanced Illness Screen  Has the patient been diagnosed with an advanced illness during the measurement year or year prior that met one or more of the following criteria?: Yes  Has the patient had two outpatient visits, observation visit, ED visit or non-acute inpatient encounter on different dates of service with an advanced illness diagnosis?: Yes  Has the patient had one acute inpatient encounter with an advanced illness diagnosis?: Yes  Has the patient been dispensed dementia medication (donepezil, galantamine,  rivastigmine, memantine)?: No    Physical Assessment:  HEENT:  wears glasses  Lungs:  Normal  Cardiac: Heart rate: HR Regular rate and rhythm Edema: edema absent  Pedal pulses: palpable  : No urinary complaints/ urine is clear and incontinent  GI:  Good appetite  Neuro: Alert orientated x3  Muscular: Wheelchair unsteady gait and weakness   Diabetic:  no    Diet: Low sodium  Integumentary: no skin concerns  Yes wound assessment includes:No skin breakdown      INR monitoring Required : No     Virtual monitoring: no      Assessment finding: Sister called and reported that patient has been coughing for two weeks. Arrived at patients residence, patient was found to be in the bed. Alert and oriented, in good spirits. Patient denies chest pain, pt states that she coughs sometimes \" dry cough\". Denies any yellow or greenish phlegm's.  No chest pain, patient afebrile. Lungs are clear. Dry in room. Patient and sister educated to use a humidifier, and if coughing won't subside, all RN/agency to notify, verbalized understanding.  At this time patient is stable without any concerns. All questions answered.     Education: Family to call agency with any health issue concerns or questions. Educated on Emergency s/s to call 911. Verbalized understanding  Education was provided to: Patient and sister    Education was provided: verbal    Response to teaching: Verbalized understanding   Barrier to learning:    no barriers apparent at this time       Topics educated on during visit:  How, to whom and when to report changes in condition, Diet education  Heart Healthy diet (low sodium), Fall Prevention reviewed Utilization of appropriate assistive device, Wait for assistance, when changing position, instruction given to go slow, stabilize self before moving, Turn on the lights, Wear appropriate shoes, and Use caution with medication that cause drowsiness or dizziness , Infection control including hand hygiene, Medication Management  Medication reconciliation completed, Reviewed medication for disease process purpose, dose and frequency, educated on how to response to medication side effects, how to manage medication missed dose , and Discussed the importance of medication compliance Side Effects, Pain Management pain scale education and use , when, to whom, and how to report pain level changes, Take medications as prescribed , Stay ahead of the pain, Get enough sleep, increase physical activity slowly, Utilize non pharmacological options such as ice, heat, massage, rest, and Review no prescription/OTC medications being used , Skin Care Management How, to who, and when to report skin changes , Healthy skin needs to be clean and well hydrated, Keep skin clean and dry as much as possible, Change positioning minimum of  every 2 hours, during normal awake hours , off load pressure points , Avoid friction and rubbing , clean skin after each soiling , and Do not massage reddened areas, and UTI disease process education How, when and who to report with changes in urinary status, Cleaning damien area from from to back , RN instructed on proper Damien care, Proper hand hygiene , Proper hydration, Reporting change in mental status, Reporting any new weakness or falls, and Reporting change in urine odor, color, and consistency           Additional Services working with pt:  None needed at this time   DME  Patient has the following items in the home: Wheelchair, Hospital bed, and BP monitor  Patient needs DME equipment: No       Case Management/Collaboration of care:  Completed with:  Patient stable, APC no needed to be notified at this visit        Labs ordered: No   Med changes or refill needs: No      [phone]

## 2024-08-07 ENCOUNTER — APPOINTMENT (OUTPATIENT)
Dept: CARDIOLOGY | Facility: CLINIC | Age: 79
End: 2024-08-07

## 2024-08-07 PROCEDURE — 93978 VASCULAR STUDY: CPT

## 2024-08-15 ENCOUNTER — APPOINTMENT (OUTPATIENT)
Dept: CARDIOLOGY | Facility: CLINIC | Age: 79
End: 2024-08-15
Payer: MEDICARE

## 2024-08-15 VITALS
BODY MASS INDEX: 24.1 KG/M2 | SYSTOLIC BLOOD PRESSURE: 110 MMHG | HEART RATE: 88 BPM | HEIGHT: 68 IN | DIASTOLIC BLOOD PRESSURE: 72 MMHG | WEIGHT: 159 LBS

## 2024-08-15 DIAGNOSIS — I71.40 ABDOMINAL AORTIC ANEURYSM, WITHOUT RUPTURE, UNSPECIFIED: ICD-10-CM

## 2024-08-15 DIAGNOSIS — I10 ESSENTIAL (PRIMARY) HYPERTENSION: ICD-10-CM

## 2024-08-15 DIAGNOSIS — Z95.2 PRESENCE OF PROSTHETIC HEART VALVE: ICD-10-CM

## 2024-08-15 DIAGNOSIS — I25.10 ATHEROSCLEROTIC HEART DISEASE OF NATIVE CORONARY ARTERY W/OUT ANGINA PECTORIS: ICD-10-CM

## 2024-08-15 DIAGNOSIS — I35.0 NONRHEUMATIC AORTIC (VALVE) STENOSIS: ICD-10-CM

## 2024-08-15 PROCEDURE — 93000 ELECTROCARDIOGRAM COMPLETE: CPT

## 2024-08-15 PROCEDURE — 99214 OFFICE O/P EST MOD 30 MIN: CPT | Mod: 25

## 2024-08-15 NOTE — ASSESSMENT
[FreeTextEntry1] : Mr. Coulter is a 79-year-old man with history of CAD (s/p CABG 2004, PCI for inferior STEMI 2018, elective PCI of the RCA 2023) and moderate-to-severe AS, presenting for follow up in management of AS and CAD.  Impression: (1) CAD s/p CABG at [facility] 2004 and inferior STEMI 2018 at [facility], recent elective PCI of the RCA; stable, no angina, LDL 88 (2) Aortic stenosis, LFLG, moderate to severe, progressing over the past year with PINKY in the severe range (Vmax 3.0, mPG 20, AV 0.83) (3) HTN (4) HLD (5) Mild AAA, infrarenal measuring 2.6x2.9cm, due for imaging 2024 (6) Carotid artery disease with protuberant plaque  Plan: - Discussed AAA finding, mild and requires annual imaging - Continue DAPT for 6 months post TAVR, drop aspirin at 6 months - Continue metoprolol as an antianginal - Continue high intensity statin - Continue losartan 50mg  RTC 6 months, sooner as needed

## 2024-08-15 NOTE — PHYSICAL EXAM
[Well Developed] : well developed [Well Nourished] : well nourished [No Acute Distress] : no acute distress [Normal Conjunctiva] : normal conjunctiva [Normal Venous Pressure] : normal venous pressure [No Carotid Bruit] : no carotid bruit [Normal S1, S2] : normal S1, S2 [No Murmur] : no murmur [No Rub] : no rub [No Gallop] : no gallop [Good Air Entry] : good air entry [No Respiratory Distress] : no respiratory distress  [Soft] : abdomen soft [Non Tender] : non-tender [No Masses/organomegaly] : no masses/organomegaly [Normal Bowel Sounds] : normal bowel sounds [Normal Gait] : normal gait [No Edema] : no edema [No Cyanosis] : no cyanosis [No Clubbing] : no clubbing [No Varicosities] : no varicosities [No Rash] : no rash [No Skin Lesions] : no skin lesions [Moves all extremities] : moves all extremities [No Focal Deficits] : no focal deficits [Normal Speech] : normal speech [Alert and Oriented] : alert and oriented [Normal memory] : normal memory [de-identified] : Faint crackles at the right base

## 2024-08-15 NOTE — HISTORY OF PRESENT ILLNESS
[FreeTextEntry1] : Mr. Coulter is a 79-year-old man with history of CAD s/p CABG x2 in [facility] 2004 and PCI for inferior STEMI 2018, moderate-to-severe aortic stenosis s/p TAVR 5/2024 presenting for post-TAVR follow up.  Patient previously followed with Dr. Bedolla and was last seen by him in clinic on 4/27/22.  On our first visit he noted he was becoming more fatigued/short of breath when he goes for a walk. TTE demonstrated stable moderate-severe AS. I referred him to the structural team and since underwent [facility] demonstrating multivessel CAD as detailed below with eventual PCI of the mid and distal RCA. CT TAVR was noted to have appropriate TAVR anatomy and he underwent TAVR 5/2024.  [facility] 8/1/2023 - MARISOL to mRCA and dRCA AAA 2023 - Infrarenal mild dilatation, 2.6x2.9cm aneurysm AAA 2024: 3.0 x 2.9cm  Carotid Doppler 9/2022:  Bilateral <50% stenosis with protuberant plaque. Carotid Doppler 10/2023: mild bilateral ICA stenoses  TTE 6/2024 - EF 55-60%, normal TAVR anatomy (Vmax 2.3, mean PG 10mmHg) TTE 4/2023 - EF 56%, unchanged RWMAs, mod-severe AS (Vmax 3.05m/s, mean PG 20.1mmHg, DI 0.25, PINKY 0.83), mild AI TTE 4/2022 - EF 50%, PB hypokinesis, moderate MR, mild AR, moderate AS (Vmax 3.0 m/s, mean PG 15 mmHg)  [facility] 5/2023 - Multivessel CAD (LM + RCA) - Patent LIMA [id number]-RCW/Diag - mRCA focal in-stent restenosis and dRCA stenosis - LCx not visualized 2/2 LM occlusion - Low CO an dLVSV - Mod-severe AS (paradoxical LF/LG) (PINKY 1.0cm2) - Rec: TAVR eval, consider PCI of RCA  [facility] 2018 at [facility] - 100% LM, LAD, LCx - Patent LIMA-diag/LAD - RCA occluded in the mid-portion, s/p PCI with MARISOL - Distal second PL is occluded, likely thrombus.  AAA US 5/2023 - Infrarenal aorta with mild dilatation (2.6 x 2.9cm)  Labs: - , TG 60, HDL 36, LDL 61, non-HDL 73 -> LDL 66, HDL 34, ,  - Cr 1.0, K 4.2 -> Cr 0.9, K 4.3, normal transaminases - A1c 6.2%, pBNP 381 -> 342, hsCRP 1.07

## 2024-09-10 ENCOUNTER — EMERGENCY (EMERGENCY)
Facility: HOSPITAL | Age: 79
LOS: 0 days | Discharge: HOME | End: 2024-09-10
Attending: EMERGENCY MEDICINE
Payer: MEDICARE

## 2024-09-10 VITALS
OXYGEN SATURATION: 96 % | HEART RATE: 88 BPM | SYSTOLIC BLOOD PRESSURE: 119 MMHG | RESPIRATION RATE: 20 BRPM | DIASTOLIC BLOOD PRESSURE: 91 MMHG

## 2024-09-10 VITALS
OXYGEN SATURATION: 98 % | TEMPERATURE: 98 F | SYSTOLIC BLOOD PRESSURE: 126 MMHG | RESPIRATION RATE: 16 BRPM | HEART RATE: 78 BPM | DIASTOLIC BLOOD PRESSURE: 76 MMHG

## 2024-09-10 DIAGNOSIS — I25.2 OLD MYOCARDIAL INFARCTION: ICD-10-CM

## 2024-09-10 DIAGNOSIS — H55.00 UNSPECIFIED NYSTAGMUS: ICD-10-CM
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
## 2024-09-10 DIAGNOSIS — I10 ESSENTIAL (PRIMARY) HYPERTENSION: ICD-10-CM

## 2024-09-10 DIAGNOSIS — Z95.1 PRESENCE OF AORTOCORONARY BYPASS GRAFT: ICD-10-CM

## 2024-09-10 DIAGNOSIS — R42 DIZZINESS AND GIDDINESS: ICD-10-CM

## 2024-09-10 DIAGNOSIS — Z98.62 PERIPHERAL VASCULAR ANGIOPLASTY STATUS: Chronic | ICD-10-CM

## 2024-09-10 DIAGNOSIS — Z90.49 ACQUIRED ABSENCE OF OTHER SPECIFIED PARTS OF DIGESTIVE TRACT: Chronic | ICD-10-CM

## 2024-09-10 DIAGNOSIS — Z95.1 PRESENCE OF AORTOCORONARY BYPASS GRAFT: Chronic | ICD-10-CM

## 2024-09-10 DIAGNOSIS — E78.5 HYPERLIPIDEMIA, UNSPECIFIED: ICD-10-CM

## 2024-09-10 DIAGNOSIS — Z95.9 PRESENCE OF CARDIAC AND VASCULAR IMPLANT AND GRAFT, UNSPECIFIED: Chronic | ICD-10-CM
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
## 2024-09-10 DIAGNOSIS — I25.10 ATHEROSCLEROTIC HEART DISEASE OF NATIVE CORONARY ARTERY WITHOUT ANGINA PECTORIS: ICD-10-CM

## 2024-09-10 DIAGNOSIS — Z23 ENCOUNTER FOR IMMUNIZATION: ICD-10-CM

## 2024-09-10 DIAGNOSIS — S01.01XA LACERATION WITHOUT FOREIGN BODY OF SCALP, INITIAL ENCOUNTER: ICD-10-CM

## 2024-09-10 DIAGNOSIS — Z95.5 PRESENCE OF CORONARY ANGIOPLASTY IMPLANT AND GRAFT: ICD-10-CM

## 2024-09-10 DIAGNOSIS — I73.9 PERIPHERAL VASCULAR DISEASE, UNSPECIFIED: ICD-10-CM

## 2024-09-10 LAB
ALBUMIN SERPL ELPH-MCNC: 4 G/DL — SIGNIFICANT CHANGE UP (ref 3.5–5.2)
ALP SERPL-CCNC: 87 U/L — SIGNIFICANT CHANGE UP (ref 30–115)
ALT FLD-CCNC: 17 U/L — SIGNIFICANT CHANGE UP (ref 0–41)
ANION GAP SERPL CALC-SCNC: 8 MMOL/L — SIGNIFICANT CHANGE UP (ref 7–14)
AST SERPL-CCNC: 23 U/L — SIGNIFICANT CHANGE UP (ref 0–41)
BASOPHILS # BLD AUTO: 0.01 K/UL — SIGNIFICANT CHANGE UP (ref 0–0.2)
BASOPHILS NFR BLD AUTO: 0.1 % — SIGNIFICANT CHANGE UP (ref 0–1)
BILIRUB SERPL-MCNC: 0.7 MG/DL — SIGNIFICANT CHANGE UP (ref 0.2–1.2)
BUN SERPL-MCNC: 15 MG/DL — SIGNIFICANT CHANGE UP (ref 10–20)
CALCIUM SERPL-MCNC: 9.3 MG/DL — SIGNIFICANT CHANGE UP (ref 8.4–10.5)
CHLORIDE SERPL-SCNC: 104 MMOL/L — SIGNIFICANT CHANGE UP (ref 98–110)
CO2 SERPL-SCNC: 25 MMOL/L — SIGNIFICANT CHANGE UP (ref 17–32)
CREAT SERPL-MCNC: 0.8 MG/DL — SIGNIFICANT CHANGE UP (ref 0.7–1.5)
EGFR: 91 ML/MIN/1.73M2 — SIGNIFICANT CHANGE UP
EOSINOPHIL # BLD AUTO: 0.23 K/UL — SIGNIFICANT CHANGE UP (ref 0–0.7)
EOSINOPHIL NFR BLD AUTO: 3.3 % — SIGNIFICANT CHANGE UP (ref 0–8)
GLUCOSE BLDC GLUCOMTR-MCNC: 143 MG/DL — HIGH (ref 70–99)
GLUCOSE SERPL-MCNC: 143 MG/DL — HIGH (ref 70–99)
HCT VFR BLD CALC: 39.3 % — LOW (ref 42–52)
HGB BLD-MCNC: 13.5 G/DL — LOW (ref 14–18)
IMM GRANULOCYTES NFR BLD AUTO: 0.3 % — SIGNIFICANT CHANGE UP (ref 0.1–0.3)
LYMPHOCYTES # BLD AUTO: 2.37 K/UL — SIGNIFICANT CHANGE UP (ref 1.2–3.4)
LYMPHOCYTES # BLD AUTO: 34.1 % — SIGNIFICANT CHANGE UP (ref 20.5–51.1)
MCHC RBC-ENTMCNC: 31 PG — SIGNIFICANT CHANGE UP (ref 27–31)
MCHC RBC-ENTMCNC: 34.4 G/DL — SIGNIFICANT CHANGE UP (ref 32–37)
MCV RBC AUTO: 90.1 FL — SIGNIFICANT CHANGE UP (ref 80–94)
MONOCYTES # BLD AUTO: 0.84 K/UL — HIGH (ref 0.1–0.6)
MONOCYTES NFR BLD AUTO: 12.1 % — HIGH (ref 1.7–9.3)
NEUTROPHILS # BLD AUTO: 3.48 K/UL — SIGNIFICANT CHANGE UP (ref 1.4–6.5)
NEUTROPHILS NFR BLD AUTO: 50.1 % — SIGNIFICANT CHANGE UP (ref 42.2–75.2)
NRBC # BLD: 0 /100 WBCS — SIGNIFICANT CHANGE UP (ref 0–0)
PLATELET # BLD AUTO: 95 K/UL — LOW (ref 130–400)
PMV BLD: 10.3 FL — SIGNIFICANT CHANGE UP (ref 7.4–10.4)
POTASSIUM SERPL-MCNC: 4.2 MMOL/L — SIGNIFICANT CHANGE UP (ref 3.5–5)
POTASSIUM SERPL-SCNC: 4.2 MMOL/L — SIGNIFICANT CHANGE UP (ref 3.5–5)
PROT SERPL-MCNC: 6.9 G/DL — SIGNIFICANT CHANGE UP (ref 6–8)
RBC # BLD: 4.36 M/UL — LOW (ref 4.7–6.1)
RBC # FLD: 13.1 % — SIGNIFICANT CHANGE UP (ref 11.5–14.5)
SODIUM SERPL-SCNC: 137 MMOL/L — SIGNIFICANT CHANGE UP (ref 135–146)
TROPONIN T, HIGH SENSITIVITY RESULT: 20 NG/L — SIGNIFICANT CHANGE UP (ref 6–21)
TROPONIN T, HIGH SENSITIVITY RESULT: 20 NG/L — SIGNIFICANT CHANGE UP (ref 6–21)
WBC # BLD: 6.95 K/UL — SIGNIFICANT CHANGE UP (ref 4.8–10.8)
WBC # FLD AUTO: 6.95 K/UL — SIGNIFICANT CHANGE UP (ref 4.8–10.8)

## 2024-09-10 PROCEDURE — 71045 X-RAY EXAM CHEST 1 VIEW: CPT

## 2024-09-10 PROCEDURE — 90471 IMMUNIZATION ADMIN: CPT

## 2024-09-10 PROCEDURE — 36000 PLACE NEEDLE IN VEIN: CPT | Mod: XU

## 2024-09-10 PROCEDURE — 90715 TDAP VACCINE 7 YRS/> IM: CPT

## 2024-09-10 PROCEDURE — 80053 COMPREHEN METABOLIC PANEL: CPT

## 2024-09-10 PROCEDURE — 85025 COMPLETE CBC W/AUTO DIFF WBC: CPT

## 2024-09-10 PROCEDURE — 71045 X-RAY EXAM CHEST 1 VIEW: CPT | Mod: 26

## 2024-09-10 PROCEDURE — 82962 GLUCOSE BLOOD TEST: CPT

## 2024-09-10 PROCEDURE — 99285 EMERGENCY DEPT VISIT HI MDM: CPT | Mod: 25

## 2024-09-10 PROCEDURE — 70450 CT HEAD/BRAIN W/O DYE: CPT | Mod: MC

## 2024-09-10 PROCEDURE — 36415 COLL VENOUS BLD VENIPUNCTURE: CPT

## 2024-09-10 PROCEDURE — 72125 CT NECK SPINE W/O DYE: CPT | Mod: MC

## 2024-09-10 PROCEDURE — 70450 CT HEAD/BRAIN W/O DYE: CPT | Mod: 26,MC

## 2024-09-10 PROCEDURE — 84484 ASSAY OF TROPONIN QUANT: CPT

## 2024-09-10 PROCEDURE — 93010 ELECTROCARDIOGRAM REPORT: CPT

## 2024-09-10 PROCEDURE — 72125 CT NECK SPINE W/O DYE: CPT | Mod: 26,MC

## 2024-09-10 PROCEDURE — 12002 RPR S/N/AX/GEN/TRNK2.6-7.5CM: CPT

## 2024-09-10 PROCEDURE — 93005 ELECTROCARDIOGRAM TRACING: CPT

## 2024-09-10 RX ORDER — TETANUS TOXOID, REDUCED DIPHTHERIA TOXOID AND ACELLULAR PERTUSSIS VACCINE, ADSORBED 5; 2.5; 8; 8; 2.5 [IU]/.5ML; [IU]/.5ML; UG/.5ML; UG/.5ML; UG/.5ML
0.5 SUSPENSION INTRAMUSCULAR ONCE
Refills: 0 | Status: COMPLETED | OUTPATIENT
Start: 2024-09-10 | End: 2024-09-10

## 2024-09-10 RX ORDER — SODIUM CHLORIDE 9 MG/ML
1000 INJECTION INTRAMUSCULAR; INTRAVENOUS; SUBCUTANEOUS ONCE
Refills: 0 | Status: COMPLETED | OUTPATIENT
Start: 2024-09-10 | End: 2024-09-10

## 2024-09-10 RX ADMIN — TETANUS TOXOID, REDUCED DIPHTHERIA TOXOID AND ACELLULAR PERTUSSIS VACCINE, ADSORBED 0.5 MILLILITER(S): 5; 2.5; 8; 8; 2.5 SUSPENSION INTRAMUSCULAR at 11:17

## 2024-09-10 RX ADMIN — SODIUM CHLORIDE 1000 MILLILITER(S): 9 INJECTION INTRAMUSCULAR; INTRAVENOUS; SUBCUTANEOUS at 10:22

## 2024-09-10 NOTE — ED PROVIDER NOTE - NSICDXPASTMEDICALHX_GEN_ALL_CORE_FT
unknown
PAST MEDICAL HISTORY:  Aortic stenosis     Broken ribs 6 fall from ladder 2019/ 1 from fall in 2021    CAD (coronary artery disease)     Cancer, colon     Hypertension     Myocardial infarction

## 2024-09-10 NOTE — ED PROVIDER NOTE - PATIENT PORTAL LINK FT
You can access the FollowMyHealth Patient Portal offered by [facility] by registering at the following website: [email or web address]. By joining [employer]’s FollowMyHealth portal, you will also be able to view your health information using other applications (apps) compatible with our system.

## 2024-09-10 NOTE — ED PROVIDER NOTE - OBJECTIVE STATEMENT
80 y/o male with PMHx: HTN, HLD, PAD s/p stent 2018, 2023, CAD h/o CABG 2004 (LIMA- DIAG/LAD) / inferior STEMI 2018, PCI of RCA,  known AS, EF 52% in 5/2024, PAD with stent RLE, chronic back pain, presents for approximately 1 hour prior to arrival.  Patient states that he was sitting down drinking his coffee when he started having dizziness sensation which he describes as wobbly with blurry vision.  He proceeded to get up and attempt to resolve his dizziness but fell backwards striking the back of his head onto a radiator.  Denies any loss of consciousness and was able to get up and ambulate afterwards with assistance of his family.  His dizziness symptoms resolved after he fell and feels at baseline at this time.  Denies any chest pain, shortness of breath, palpitations, global headache, focal neurological deficit, leg swelling, n/v/d, fever, recent illness, neck pain, back pain. no hx of similar.
170

## 2024-09-10 NOTE — ED PROVIDER NOTE - PHYSICAL EXAMINATION
Initial vital signs reviewed.    Airway: patent  Breathing: clear breath sounds bilaterally.  Circulatory: 2+ and equal distal pulses x4.    General: NAD, nontoxic appearing.  HENT: 3.5cm linear laceration to posterior scalp, no skull depression or hematoma. No garrett signs, raccoon eyes, or skull deformity. Oropharynx clear without evidence of trauma.  Eyes: non-injected conjunctivae b/l. PERRLA b/l. Mild horizontal nystagmus otherwise EOMI b/l.  Neck: supple. No midline cervical stepoff or tenderness.  CV: RRR, no murmurs.  Pulm: nonlabored work of breathing, CTAB. No chest wall tenderness to palpation or crepitus.  Abd: soft, nondistended, nontender.  MSK: no joint deformity. No pelvis instability. No tenderness to palpation in extremities x4. No T/L/S midline spinal tenderness or stepoffs.  Skin: warm, dry, well-perfused. No active bleeding.  Neuro: A&Ox4. CN2-12 intact. No dysmetria with finger-to-nose. No dysdiadochokinesia with PATRICK. Sensation to light touch diffusely. 5/5 str b/l UE and LE. No pronator drift.  Psych: appropriate mood and affect.

## 2024-09-10 NOTE — ED PROVIDER NOTE - NSTIMEPROVIDERCAREINITIATE_GEN_ER
Group Therapy Note    Date: 7/8/2024    Group Start Time: 1430  Group End Time: 1505  Group Topic: Recreational    [facility] ACUTE [facility] 2    Radha Marcial CTRS    Group Therapy Note    Attendees: 13    Date: 7/8/2024  Start Time: 1430  End Time:  1505  Number of Participants: 13    Type of Group: Recreational    Name:  Trivia    Patient's Goal:  Increased awareness of summer facts. Encouraged patient socialization.     Notes:  CTRS facilitated Nonpareil game. Patient did not engage in group activity. Patient fidgeted with papers in front of him throughout group.    Status After Intervention:  Unchanged    Participation Level: None    Participation Quality: Resistant      Speech:  None      Thought Process/Content: None observed      Affective Functioning: Congruent      Mood:  Flat      Level of consciousness:  Preoccupied and Inattentive      Response to Learning: Resistant      Endings: None Reported    Modes of Intervention: Socialization and Activity      Discipline Responsible: Psychoeducational Specialist      Signature:  BATOOL Campos     10-Sep-2024 09:53 Statement Selected

## 2024-09-10 NOTE — ED PROVIDER NOTE - CARE PLAN
Principal Discharge DX:	Fall  Secondary Diagnosis:	Dizziness  Secondary Diagnosis:	Occipital scalp laceration   1

## 2024-09-10 NOTE — ED ADULT NURSE NOTE - NSFALLHARMRISKINTERV_ED_ALL_ED

## 2024-09-10 NOTE — ED ADULT TRIAGE NOTE - CHIEF COMPLAINT QUOTE
"I felt lightheaded at breakfast as I was sitting down, when I stood up I fell back and hit my head on the radiator. I did not pass out" Pt is on clopidogrel.

## 2024-09-10 NOTE — ED PROVIDER NOTE - CLINICAL SUMMARY MEDICAL DECISION MAKING FREE TEXT BOX
79-year-old male above past medical history was sitting when he started to feel both dizzy and lightheaded as well as "wobbly," has felt this way in the past and his cardiologist is well aware, thought he feel better if he stood but then fell backwards striking head, no LOC, symptoms resolved and he is ambulatory without difficulty, no chest pain or shortness of breath no palpitations, no numbness or focal weakness, no difficulty speaking or swallowing, on exam vitals appreciated, alert and oriented x 3 GCS 15 well-appearing with laceration to vertex, PERRLA with subtle fatigable rightward nystagmus, conjunctive pink OP clear neck supple no CTLs midline TTP, cor regular lungs clear abdomen soft nontender calves nontender, pulses equal, neurologically nonfocal, labs and studies appreciated, patient eager for discharge, ambulatory without difficulty, long discussion had with patient regarding chronic findings on head CT, to follow-up with his PMD, cardiology, neurology, counseled regarding conditions which should prompt return.

## 2024-09-10 NOTE — ED PROVIDER NOTE - NSFOLLOWUPINSTRUCTIONS_ED_ALL_ED_FT
Please return in 7-10 days to have your 7 staples removed.    Keep the wound clean, dry, and covered for the first 24 hours.  Afterwards, you can shower and wash the area normally with warm soapy water. Pat down dry.  Avoid scratching, rubbing, or exfoliating the area while the sutures are in place.  Once wound is healed and sutures removed, apply sunscreen to area to minimize scarring.    Laceration    A laceration is a cut that goes through all of the layers of the skin and into the tissue that is right under the skin. Some lacerations heal on their own. Others need to be closed with skin adhesive strips, skin glue, stitches (sutures), or staples. Proper laceration care minimizes the risk of infection and helps the laceration to heal better.  If non-absorbable stitches or staples have been placed, they must be taken out within the time frame instructed by your healthcare provider.    SEEK IMMEDIATE MEDICAL CARE IF YOU HAVE ANY OF THE FOLLOWING SYMPTOMS: swelling around the wound, worsening pain, drainage from the wound, red streaking going away from your wound, inability to move finger or toe near the laceration, or discoloration of skin near the laceration.    Dizziness    Dizziness can manifest as a feeling of unsteadiness or light-headedness. You may feel like you are about to faint. This condition can be caused by a number of things, including medicines, dehydration, or illness. Drink enough fluid to keep your urine clear or pale yellow. Do not drink alcohol and limit your caffeine intake. Avoid quick or sudden movements.  Rise slowly from chairs and steady yourself until you feel okay. In the morning, first sit up on the side of the bed.    SEEK IMMEDIATE MEDICAL CARE IF YOU HAVE ANY OF THE FOLLOWING SYMPTOMS: vomiting, changes in your vision or speech, weakness in your arms or legs, trouble speaking or swallowing, chest pain, abdominal pain, shortness of breath, sweating, bleeding, headache, neck pain, or fever. Your CT results did show evidence of possible prior strokes. Please follow up with your primary care provider regarding this.    Please return in 7-10 days to have your 7 staples removed.    Keep the wound clean, dry, and covered for the first 24 hours.  Afterwards, you can shower and wash the area normally with warm soapy water. Pat down dry.  Avoid scratching, rubbing, or exfoliating the area while the sutures are in place.  Once wound is healed and sutures removed, apply sunscreen to area to minimize scarring.    Laceration    A laceration is a cut that goes through all of the layers of the skin and into the tissue that is right under the skin. Some lacerations heal on their own. Others need to be closed with skin adhesive strips, skin glue, stitches (sutures), or staples. Proper laceration care minimizes the risk of infection and helps the laceration to heal better.  If non-absorbable stitches or staples have been placed, they must be taken out within the time frame instructed by your healthcare provider.    SEEK IMMEDIATE MEDICAL CARE IF YOU HAVE ANY OF THE FOLLOWING SYMPTOMS: swelling around the wound, worsening pain, drainage from the wound, red streaking going away from your wound, inability to move finger or toe near the laceration, or discoloration of skin near the laceration.    Dizziness    Dizziness can manifest as a feeling of unsteadiness or light-headedness. You may feel like you are about to faint. This condition can be caused by a number of things, including medicines, dehydration, or illness. Drink enough fluid to keep your urine clear or pale yellow. Do not drink alcohol and limit your caffeine intake. Avoid quick or sudden movements.  Rise slowly from chairs and steady yourself until you feel okay. In the morning, first sit up on the side of the bed.    SEEK IMMEDIATE MEDICAL CARE IF YOU HAVE ANY OF THE FOLLOWING SYMPTOMS: vomiting, changes in your vision or speech, weakness in your arms or legs, trouble speaking or swallowing, chest pain, abdominal pain, shortness of breath, sweating, bleeding, headache, neck pain, or fever.

## 2024-09-10 NOTE — ED ADULT NURSE NOTE - NSICDXPASTMEDICALHX_GEN_ALL_CORE_FT
PAST MEDICAL HISTORY:  Aortic stenosis     Broken ribs 6 fall from ladder 2019/ 1 from fall in 2021    CAD (coronary artery disease)     Cancer, colon     Hypertension     Myocardial infarction

## 2024-09-13 ENCOUNTER — APPOINTMENT (OUTPATIENT)
Dept: CARDIOLOGY | Facility: CLINIC | Age: 79
End: 2024-09-13

## 2024-12-25 PROBLEM — F10.90 ALCOHOL USE: Status: ACTIVE | Noted: 2017-08-01

## 2025-01-27 ENCOUNTER — RX RENEWAL (OUTPATIENT)
Age: 80
End: 2025-01-27

## 2025-02-03 ENCOUNTER — LABORATORY RESULT (OUTPATIENT)
Age: 80
End: 2025-02-03

## 2025-02-13 ENCOUNTER — APPOINTMENT (OUTPATIENT)
Dept: CARDIOLOGY | Facility: CLINIC | Age: 80
End: 2025-02-13
Payer: MEDICARE

## 2025-02-13 VITALS
WEIGHT: 168 LBS | TEMPERATURE: 98 F | DIASTOLIC BLOOD PRESSURE: 80 MMHG | BODY MASS INDEX: 25.46 KG/M2 | HEIGHT: 68 IN | SYSTOLIC BLOOD PRESSURE: 129 MMHG | HEART RATE: 84 BPM

## 2025-02-13 DIAGNOSIS — I10 ESSENTIAL (PRIMARY) HYPERTENSION: ICD-10-CM

## 2025-02-13 DIAGNOSIS — I71.40 ABDOMINAL AORTIC ANEURYSM, WITHOUT RUPTURE, UNSPECIFIED: ICD-10-CM

## 2025-02-13 DIAGNOSIS — I35.0 NONRHEUMATIC AORTIC (VALVE) STENOSIS: ICD-10-CM

## 2025-02-13 DIAGNOSIS — I25.10 ATHEROSCLEROTIC HEART DISEASE OF NATIVE CORONARY ARTERY W/OUT ANGINA PECTORIS: ICD-10-CM

## 2025-02-13 DIAGNOSIS — Z95.2 PRESENCE OF PROSTHETIC HEART VALVE: ICD-10-CM

## 2025-02-13 PROCEDURE — 93000 ELECTROCARDIOGRAM COMPLETE: CPT

## 2025-02-13 PROCEDURE — G2211 COMPLEX E/M VISIT ADD ON: CPT

## 2025-02-13 PROCEDURE — 99214 OFFICE O/P EST MOD 30 MIN: CPT

## 2025-03-18 ENCOUNTER — NON-APPOINTMENT (OUTPATIENT)
Age: 80
End: 2025-03-18

## 2025-03-18 DIAGNOSIS — Z95.2 PRESENCE OF PROSTHETIC HEART VALVE: ICD-10-CM

## 2025-04-08 NOTE — REVIEW OF SYSTEMS
What Type Of Note Output Would You Prefer (Optional)?: Bullet Format How Severe Are Your Spot(S)?: mild Have Your Spot(S) Been Treated In The Past?: has not been treated Hpi Title: Evaluation of Skin Lesions [Negative] : Heme/Lymph

## 2025-04-09 ENCOUNTER — APPOINTMENT (OUTPATIENT)
Dept: CARDIOLOGY | Facility: CLINIC | Age: 80
End: 2025-04-09
Payer: MEDICARE

## 2025-04-09 PROCEDURE — 93306 TTE W/DOPPLER COMPLETE: CPT

## 2025-04-25 ENCOUNTER — APPOINTMENT (OUTPATIENT)
Dept: CARDIOTHORACIC SURGERY | Facility: CLINIC | Age: 80
End: 2025-04-25
Payer: MEDICARE

## 2025-04-25 DIAGNOSIS — Z95.2 PRESENCE OF PROSTHETIC HEART VALVE: ICD-10-CM

## 2025-04-25 DIAGNOSIS — I10 ESSENTIAL (PRIMARY) HYPERTENSION: ICD-10-CM

## 2025-04-25 PROCEDURE — 99213 OFFICE O/P EST LOW 20 MIN: CPT | Mod: 93

## 2025-05-07 ENCOUNTER — RX RENEWAL (OUTPATIENT)
Age: 80
End: 2025-05-07

## 2025-08-06 ENCOUNTER — APPOINTMENT (OUTPATIENT)
Dept: CARDIOLOGY | Facility: CLINIC | Age: 80
End: 2025-08-06

## 2025-08-06 PROCEDURE — 93306 TTE W/DOPPLER COMPLETE: CPT

## 2025-08-06 PROCEDURE — 93978 VASCULAR STUDY: CPT

## 2025-08-21 ENCOUNTER — APPOINTMENT (OUTPATIENT)
Dept: CARDIOLOGY | Facility: CLINIC | Age: 80
End: 2025-08-21
Payer: MEDICARE

## 2025-08-21 VITALS
WEIGHT: 164 LBS | DIASTOLIC BLOOD PRESSURE: 78 MMHG | SYSTOLIC BLOOD PRESSURE: 119 MMHG | BODY MASS INDEX: 24.86 KG/M2 | HEART RATE: 76 BPM | HEIGHT: 68 IN

## 2025-08-21 DIAGNOSIS — I10 ESSENTIAL (PRIMARY) HYPERTENSION: ICD-10-CM

## 2025-08-21 DIAGNOSIS — I71.40 ABDOMINAL AORTIC ANEURYSM, WITHOUT RUPTURE, UNSPECIFIED: ICD-10-CM

## 2025-08-21 DIAGNOSIS — I25.10 ATHEROSCLEROTIC HEART DISEASE OF NATIVE CORONARY ARTERY W/OUT ANGINA PECTORIS: ICD-10-CM

## 2025-08-21 DIAGNOSIS — Z95.2 PRESENCE OF PROSTHETIC HEART VALVE: ICD-10-CM

## 2025-08-21 PROCEDURE — 99214 OFFICE O/P EST MOD 30 MIN: CPT

## 2025-08-21 PROCEDURE — G2211 COMPLEX E/M VISIT ADD ON: CPT

## 2025-08-21 PROCEDURE — 93000 ELECTROCARDIOGRAM COMPLETE: CPT
